# Patient Record
Sex: FEMALE | Race: BLACK OR AFRICAN AMERICAN | NOT HISPANIC OR LATINO | Employment: UNEMPLOYED | URBAN - METROPOLITAN AREA
[De-identification: names, ages, dates, MRNs, and addresses within clinical notes are randomized per-mention and may not be internally consistent; named-entity substitution may affect disease eponyms.]

---

## 2017-11-16 ENCOUNTER — CONVERSION ENCOUNTER (OUTPATIENT)
Dept: MAMMOGRAPHY | Facility: HOSPITAL | Age: 52
End: 2017-11-16

## 2018-06-18 ENCOUNTER — OFFICE VISIT CONVERTED (OUTPATIENT)
Dept: GASTROENTEROLOGY | Facility: CLINIC | Age: 53
End: 2018-06-18
Attending: NURSE PRACTITIONER

## 2019-04-12 ENCOUNTER — HOSPITAL ENCOUNTER (OUTPATIENT)
Dept: OTHER | Facility: HOSPITAL | Age: 54
Discharge: HOME | End: 2019-04-12
Attending: INTERNAL MEDICINE

## 2019-04-12 LAB
25(OH)D3 SERPL-MCNC: 14.1 NG/ML (ref 30–100)
ALBUMIN SERPL-MCNC: 4.6 G/DL (ref 3.5–5)
ALBUMIN/GLOB SERPL: 1.8 {RATIO} (ref 1.4–2.6)
ALP SERPL-CCNC: 89 U/L (ref 53–141)
ALT SERPL-CCNC: 12 U/L (ref 10–40)
ANION GAP SERPL CALC-SCNC: 15 MMOL/L (ref 8–19)
AST SERPL-CCNC: 16 U/L (ref 15–50)
BILIRUB SERPL-MCNC: 0.27 MG/DL (ref 0.2–1.3)
BUN SERPL-MCNC: 12 MG/DL (ref 5–25)
BUN/CREAT SERPL: 19 {RATIO} (ref 6–20)
CALCIUM SERPL-MCNC: 9.3 MG/DL (ref 8.7–10.4)
CHLORIDE SERPL-SCNC: 100 MMOL/L (ref 99–111)
CHOLEST SERPL-MCNC: 185 MG/DL (ref 107–200)
CHOLEST/HDLC SERPL: 2.2 {RATIO} (ref 3–6)
CONV CO2: 26 MMOL/L (ref 22–32)
CONV TOTAL PROTEIN: 7.1 G/DL (ref 6.3–8.2)
CREAT UR-MCNC: 0.62 MG/DL (ref 0.5–0.9)
GFR SERPLBLD BASED ON 1.73 SQ M-ARVRAT: >60 ML/MIN/{1.73_M2}
GLOBULIN UR ELPH-MCNC: 2.5 G/DL (ref 2–3.5)
GLUCOSE SERPL-MCNC: 130 MG/DL (ref 65–99)
HDLC SERPL-MCNC: 85 MG/DL (ref 40–60)
IRON SATN MFR SERPL: 17 % (ref 20–55)
IRON SERPL-MCNC: 61 UG/DL (ref 60–170)
LDLC SERPL CALC-MCNC: 80 MG/DL (ref 70–100)
OSMOLALITY SERPL CALC.SUM OF ELEC: 286 MOSM/KG (ref 273–304)
POTASSIUM SERPL-SCNC: 4.2 MMOL/L (ref 3.5–5.3)
SODIUM SERPL-SCNC: 137 MMOL/L (ref 135–147)
T4 FREE SERPL-MCNC: 1.3 NG/DL (ref 0.9–1.8)
TIBC SERPL-MCNC: 359 UG/DL (ref 245–450)
TRANSFERRIN SERPL-MCNC: 251 MG/DL (ref 250–380)
TRIGL SERPL-MCNC: 98 MG/DL (ref 40–150)
TSH SERPL-ACNC: 1.2 M[IU]/L (ref 0.27–4.2)
VIT B12 SERPL-MCNC: 627 PG/ML (ref 211–911)
VLDLC SERPL-MCNC: 20 MG/DL (ref 5–37)

## 2019-04-13 LAB — HCV AB S/CO SERPL IA: <0.1 S/CO RATIO (ref 0–0.9)

## 2019-04-25 ENCOUNTER — HOSPITAL ENCOUNTER (OUTPATIENT)
Dept: URGENT CARE | Facility: CLINIC | Age: 54
Discharge: HOME | End: 2019-04-25
Attending: FAMILY MEDICINE

## 2019-09-08 ENCOUNTER — HOSPITAL ENCOUNTER (OUTPATIENT)
Dept: URGENT CARE | Facility: CLINIC | Age: 54
Discharge: HOME | End: 2019-09-08
Attending: NURSE PRACTITIONER

## 2020-02-25 ENCOUNTER — CONVERSION ENCOUNTER (OUTPATIENT)
Dept: FAMILY MEDICINE CLINIC | Facility: CLINIC | Age: 55
End: 2020-02-25

## 2020-02-25 ENCOUNTER — OFFICE VISIT CONVERTED (OUTPATIENT)
Dept: FAMILY MEDICINE CLINIC | Facility: CLINIC | Age: 55
End: 2020-02-25
Attending: NURSE PRACTITIONER

## 2020-03-25 ENCOUNTER — TELEMEDICINE CONVERTED (OUTPATIENT)
Dept: FAMILY MEDICINE CLINIC | Facility: CLINIC | Age: 55
End: 2020-03-25
Attending: NURSE PRACTITIONER

## 2020-04-10 ENCOUNTER — TELEMEDICINE CONVERTED (OUTPATIENT)
Dept: FAMILY MEDICINE CLINIC | Facility: CLINIC | Age: 55
End: 2020-04-10
Attending: NURSE PRACTITIONER

## 2020-06-24 ENCOUNTER — HOSPITAL ENCOUNTER (OUTPATIENT)
Dept: FAMILY MEDICINE CLINIC | Facility: CLINIC | Age: 55
Discharge: HOME | End: 2020-06-24
Attending: NURSE PRACTITIONER

## 2020-06-24 ENCOUNTER — OFFICE VISIT CONVERTED (OUTPATIENT)
Dept: FAMILY MEDICINE CLINIC | Facility: CLINIC | Age: 55
End: 2020-06-24
Attending: NURSE PRACTITIONER

## 2020-06-24 LAB
25(OH)D3 SERPL-MCNC: 15.6 NG/ML (ref 30–100)
ALBUMIN SERPL-MCNC: 4.4 G/DL (ref 3.5–5)
ALBUMIN/GLOB SERPL: 2 {RATIO} (ref 1.4–2.6)
ALP SERPL-CCNC: 73 U/L (ref 53–141)
ALT SERPL-CCNC: 9 U/L (ref 10–40)
ANION GAP SERPL CALC-SCNC: 17 MMOL/L (ref 8–19)
AST SERPL-CCNC: 15 U/L (ref 15–50)
BASOPHILS # BLD AUTO: 0.01 10*3/UL (ref 0–0.2)
BASOPHILS NFR BLD AUTO: 0.3 % (ref 0–3)
BILIRUB SERPL-MCNC: <0.15 MG/DL (ref 0.2–1.3)
BUN SERPL-MCNC: 7 MG/DL (ref 5–25)
BUN/CREAT SERPL: 11 {RATIO} (ref 6–20)
CALCIUM SERPL-MCNC: 8.8 MG/DL (ref 8.7–10.4)
CHLORIDE SERPL-SCNC: 101 MMOL/L (ref 99–111)
CHOLEST SERPL-MCNC: 170 MG/DL (ref 107–200)
CHOLEST/HDLC SERPL: 2.5 {RATIO} (ref 3–6)
CONV ABS IMM GRAN: 0.01 10*3/UL (ref 0–0.2)
CONV CO2: 24 MMOL/L (ref 22–32)
CONV IMMATURE GRAN: 0.3 % (ref 0–1.8)
CONV TOTAL PROTEIN: 6.6 G/DL (ref 6.3–8.2)
CREAT UR-MCNC: 0.62 MG/DL (ref 0.5–0.9)
DEPRECATED RDW RBC AUTO: 47.2 FL (ref 36.4–46.3)
EOSINOPHIL # BLD AUTO: 0.04 10*3/UL (ref 0–0.7)
EOSINOPHIL # BLD AUTO: 1.1 % (ref 0–7)
ERYTHROCYTE [DISTWIDTH] IN BLOOD BY AUTOMATED COUNT: 13.7 % (ref 11.7–14.4)
FOLATE SERPL-MCNC: 5.5 NG/ML (ref 4.8–20)
GFR SERPLBLD BASED ON 1.73 SQ M-ARVRAT: >60 ML/MIN/{1.73_M2}
GLOBULIN UR ELPH-MCNC: 2.2 G/DL (ref 2–3.5)
GLUCOSE SERPL-MCNC: 93 MG/DL (ref 65–99)
HCT VFR BLD AUTO: 37.4 % (ref 37–47)
HDLC SERPL-MCNC: 67 MG/DL (ref 40–60)
HGB BLD-MCNC: 12.2 G/DL (ref 12–16)
LDLC SERPL CALC-MCNC: 48 MG/DL (ref 70–100)
LYMPHOCYTES # BLD AUTO: 1.9 10*3/UL (ref 1–5)
LYMPHOCYTES NFR BLD AUTO: 54.3 % (ref 20–45)
MCH RBC QN AUTO: 30.3 PG (ref 27–31)
MCHC RBC AUTO-ENTMCNC: 32.6 G/DL (ref 33–37)
MCV RBC AUTO: 92.8 FL (ref 81–99)
MONOCYTES # BLD AUTO: 0.27 10*3/UL (ref 0.2–1.2)
MONOCYTES NFR BLD AUTO: 7.7 % (ref 3–10)
NEUTROPHILS # BLD AUTO: 1.27 10*3/UL (ref 2–8)
NEUTROPHILS NFR BLD AUTO: 36.3 % (ref 30–85)
NRBC CBCN: 0 % (ref 0–0.7)
OSMOLALITY SERPL CALC.SUM OF ELEC: 286 MOSM/KG (ref 273–304)
PLATELET # BLD AUTO: 172 10*3/UL (ref 130–400)
PMV BLD AUTO: 10.6 FL (ref 9.4–12.3)
POTASSIUM SERPL-SCNC: 3.4 MMOL/L (ref 3.5–5.3)
RBC # BLD AUTO: 4.03 10*6/UL (ref 4.2–5.4)
SODIUM SERPL-SCNC: 139 MMOL/L (ref 135–147)
T4 FREE SERPL-MCNC: 1.1 NG/DL (ref 0.9–1.8)
TRIGL SERPL-MCNC: 274 MG/DL (ref 40–150)
TSH SERPL-ACNC: 3.08 M[IU]/L (ref 0.27–4.2)
VIT B12 SERPL-MCNC: 526 PG/ML (ref 211–911)
VLDLC SERPL-MCNC: 55 MG/DL (ref 5–37)
WBC # BLD AUTO: 3.5 10*3/UL (ref 4.8–10.8)

## 2020-07-17 ENCOUNTER — OFFICE VISIT CONVERTED (OUTPATIENT)
Dept: FAMILY MEDICINE CLINIC | Facility: CLINIC | Age: 55
End: 2020-07-17
Attending: NURSE PRACTITIONER

## 2020-10-09 ENCOUNTER — OFFICE VISIT CONVERTED (OUTPATIENT)
Dept: FAMILY MEDICINE CLINIC | Facility: CLINIC | Age: 55
End: 2020-10-09
Attending: NURSE PRACTITIONER

## 2020-11-19 ENCOUNTER — HOSPITAL ENCOUNTER (OUTPATIENT)
Dept: ONCOLOGY | Facility: HOSPITAL | Age: 55
Discharge: HOME | End: 2020-11-19
Attending: INTERNAL MEDICINE

## 2020-11-19 ENCOUNTER — OFFICE VISIT CONVERTED (OUTPATIENT)
Dept: ONCOLOGY | Facility: HOSPITAL | Age: 55
End: 2020-11-19
Attending: INTERNAL MEDICINE

## 2020-12-03 ENCOUNTER — HOSPITAL ENCOUNTER (OUTPATIENT)
Dept: OTHER | Facility: HOSPITAL | Age: 55
Discharge: HOME | End: 2020-12-03
Attending: INTERNAL MEDICINE

## 2020-12-03 LAB — ERYTHROCYTE [SEDIMENTATION RATE] IN BLOOD: 10 MM/H (ref 0–30)

## 2020-12-09 LAB
DSDNA AB SER-ACNC: NEGATIVE [IU]/ML
ENA AB SER IA-ACNC: NEGATIVE {RATIO}

## 2020-12-29 ENCOUNTER — OFFICE VISIT CONVERTED (OUTPATIENT)
Dept: FAMILY MEDICINE CLINIC | Facility: CLINIC | Age: 55
End: 2020-12-29
Attending: NURSE PRACTITIONER

## 2020-12-29 ENCOUNTER — HOSPITAL ENCOUNTER (OUTPATIENT)
Dept: FAMILY MEDICINE CLINIC | Facility: CLINIC | Age: 55
Discharge: HOME | End: 2020-12-29
Attending: NURSE PRACTITIONER

## 2020-12-31 LAB — SARS-COV-2 RNA SPEC QL NAA+PROBE: NOT DETECTED

## 2021-01-05 ENCOUNTER — OFFICE VISIT CONVERTED (OUTPATIENT)
Dept: FAMILY MEDICINE CLINIC | Facility: CLINIC | Age: 56
End: 2021-01-05
Attending: NURSE PRACTITIONER

## 2021-01-08 ENCOUNTER — CONVERSION ENCOUNTER (OUTPATIENT)
Dept: SURGERY | Facility: CLINIC | Age: 56
End: 2021-01-08

## 2021-01-08 ENCOUNTER — OFFICE VISIT CONVERTED (OUTPATIENT)
Dept: SURGERY | Facility: CLINIC | Age: 56
End: 2021-01-08
Attending: SURGERY

## 2021-01-29 ENCOUNTER — HOSPITAL ENCOUNTER (OUTPATIENT)
Dept: PREADMISSION TESTING | Facility: HOSPITAL | Age: 56
Discharge: HOME | End: 2021-01-29
Attending: SURGERY

## 2021-01-30 LAB — SARS-COV-2 RNA SPEC QL NAA+PROBE: NOT DETECTED

## 2021-02-02 ENCOUNTER — TELEPHONE CONVERTED (OUTPATIENT)
Dept: FAMILY MEDICINE CLINIC | Facility: CLINIC | Age: 56
End: 2021-02-02
Attending: NURSE PRACTITIONER

## 2021-02-03 ENCOUNTER — HOSPITAL ENCOUNTER (OUTPATIENT)
Dept: PERIOP | Facility: HOSPITAL | Age: 56
Setting detail: HOSPITAL OUTPATIENT SURGERY
Discharge: HOME | End: 2021-02-03
Attending: SURGERY

## 2021-02-09 ENCOUNTER — HOSPITAL ENCOUNTER (OUTPATIENT)
Dept: GENERAL RADIOLOGY | Facility: HOSPITAL | Age: 56
Discharge: HOME | End: 2021-02-09
Attending: NURSE PRACTITIONER

## 2021-02-23 ENCOUNTER — OFFICE VISIT CONVERTED (OUTPATIENT)
Dept: SURGERY | Facility: CLINIC | Age: 56
End: 2021-02-23
Attending: SURGERY

## 2021-03-09 ENCOUNTER — OFFICE VISIT CONVERTED (OUTPATIENT)
Dept: SURGERY | Facility: CLINIC | Age: 56
End: 2021-03-09
Attending: SURGERY

## 2021-03-19 ENCOUNTER — TELEMEDICINE CONVERTED (OUTPATIENT)
Dept: FAMILY MEDICINE CLINIC | Facility: CLINIC | Age: 56
End: 2021-03-19
Attending: NURSE PRACTITIONER

## 2021-03-25 ENCOUNTER — CONVERSION ENCOUNTER (OUTPATIENT)
Dept: FAMILY MEDICINE CLINIC | Facility: CLINIC | Age: 56
End: 2021-03-25

## 2021-05-10 NOTE — H&P
History and Physical      Patient Name: Kacie Lynch   Patient ID: [id number]   Sex: Female   YOB: 1965    Primary Care Provider: Kaylan COWAN   Referring Provider: Kaylan COWAN    Visit Date: January 8, 2021    Provider: Zachary Ham MD   Location: [facility]   Location Address: [address] [place] [address]  [id number]   Location Phone: [phone]          Chief Complaint  · Outpatient History & Physical / Surgical Orders  · Hernia Consult      History Of Present Illness     Ms. Lynch is a very nice lady who came in today for evaluation. She has had a prior surgery for a laparoscopic cholecystectomy. She was having some abdominal pain and went to the ER recently. She was noted to have an incisional hernia defect with some contained intraabdominal fat. Otherwise, she is doing okay and had no other complaints today.       Past Medical History  Abdominal hernia; Allergies; Anxiety; Bipolar disorder; Chest pain; Depression; Depression; Diverticulitis; GERD; Hair loss; Heart Murmur; Hypertension; Insomnia; Leukocytopenia; Pharyngitis, acute; Urinary Incontinence, unspecified; Vitamin D deficiency         Past Surgical History  Bladder Surg.; Cholecystectomy; Colonoscopy; Hysterectomy; Tubal ligation         Medication List  Bromfed DM 2-30-10 mg/5 mL oral syrup; buspirone 7.5 mg oral tablet; cetirizine 10 mg oral tablet; dicyclomine 20 mg oral tablet; famotidine 20 mg oral tablet; ibuprofen 800 mg oral tablet; Latuda 20 mg oral tablet; Nexium 40 mg oral capsule,delayed release(DR/EC); ondansetron 4 mg oral tablet,disintegrating; tizanidine 4 mg oral tablet; trazodone 300 mg oral tablet; Vitamin D2 1,250 mcg (50,000 unit) oral capsule         Allergy List  ciprofloxacin; hydrocodone-acetaminophen; Latex Exam Gloves; losartan; Vraylar       Allergies Reconciled  Family Medical History  Breast Neoplasm, Malignant; Stroke; Lung cancer  "        Reproductive History   1 Para 1 0 0 0 & Postmenopausal       Social History  Alcohol (Never); Tobacco (Never)         Immunizations  Name Date Admin   Influenza Refused   Influenza Refused         Review of Systems  · Integument  o Admits  o : skin lesion or lump      Vitals  Date Time BP Position Site L\R Cuff Size HR RR TEMP (F) WT  HT  BMI kg/m2 BSA m2 O2 Sat FR L/min FiO2 HC       2021 10:07 AM       14  207lbs 0oz 5'  3\" 36.67 2.04             Physical Examination  · Constitutional  o Appearance  o : well-nourished, well developed, alert, in no acute distress  · Head and Face  o Head  o :   § Inspection  § : atraumatic, normocephalic  · Neck  o Inspection/Palpation  o : supple, normal range of motion  · Respiratory  o Inspection of Chest  o : normal inspection  o Auscultation of Lungs  o : breath sounds normal, no distress, clear to ascultate bilaterally  · Cardiovascular  o Heart  o :   § Auscultation of Heart  § : regular rate and rhythm, no murmur, gallop or rub  · Gastrointestinal  o Abdominal Examination  o : normal bowel sounds, non-tender, soft          Assessment  · Pre-Surgical Orders     V72.84  · Incisional hernia, without obstruction or gangrene     553.21/K43.2       Very nice lady who has a symptomatic incisional hernia.       Plan  · Orders  o [facility] Pre-Op Covid-19 Screening (73716) - 553.21/K43.2 - 2021  o General Surgery Order (GENOR) - 553.21/K43.2 - 2021  · Medications  o Medications have been Reconciled  o Transition of Care or Provider Policy  · Instructions  o PLAN:  o Handouts Provided-Pre-Procedure Instructions including date and time and location of procedure.  o Surgical Facility: [facility]  o ****Surgical Orders****  o ****Patient Status****  o Outpatient  o RISK AND BENEFITS:  o Consent for surgery: Given these options, the patient has verbally expressed an understanding of the risks of surgery and finds these risks acceptable. We will " proceed with surgery as soon as possible.  o Consult Anesthesia for any post-operative block, or any pain management procedure deemed necessary by the anestesiologist for adequate post-operative pain control.   o O.R. PREP: Per protocol  o SCD's preoperatively  o PLEASE SIGN PERMIT FOR: Laparoscopic incisional hernia repair  o *__Kefzol 2 gram IV on call to OR.  o *___The above History and Physical Examination has been completed within 30 days of admission.  o Electronically Identified Patient Education Materials Provided Electronically     We are going to set her up for a laparoscopic incisional hernia repair. I have described the procedure to her as well as the risks and benefits and she is agreeable to proceeding.             Electronically Signed by: Sunita Ko-, -Author on January 8, 2021 03:16:20 PM  Electronically Co-signed by: Zachary Ham MD -Reviewer on January 12, 2021 03:06:19 PM

## 2021-05-12 NOTE — PROGRESS NOTES
Quick Note      Patient Name: Kacie Lynch   Patient ID: [id number]   Sex: Female   YOB: 1965    Primary Care Provider: Nelsy COWAN   Referring Provider: Nelsy COWAN    Visit Date: April 10, 2020    Provider: CHAPO Morocho   Location: [facility]   Location Address: [address] [place] [address], [address] [place] [address]  [id number]   Location Phone: [phone]          History Of Present Illness  Video Conferencing Visit  Kacie Lynch is a 54 year old /Black female who is presenting for evaluation via video conferencing. Verbal consent obtained before beginning visit.   The following staff were present during this visit: CHAPO Marie   TELEHEALTH VISIT  Chief Complaint: Follow up on bipolar to discuss GeneSight results and plan of care.   Provider spent 15 minutes with the patient during telehealth visit.   Past Medical History/Overview of Patient Symptoms     Patient has a long history of bipolar with depression and anxiety.  She is tried and failed multiple medications in the past to include Seroquel, Lamictal, Depakote, Abilify.  She most recently tried Vraylar but developed a rash so she stopped taking it.  She is taken buspirone 7.5 mg 3 times daily for anxiety.  She states anxiety is stable.  She is also on trazodone 300 mg nightly.    GeneSight results reviewed and discussed with patient.  She states she has used Latuda in the past without any side effects.    GERD: She is complaining of acid reflux worse lately.  She is taking Nexium 40 mg daily.    She states her daughter just had a baby this week and she is brought her daughter a new grandbaby home from the hospital.       Physical Examination  · Constitutional  o Appearance  o : no acute distress, well-nourished  · Head and Face  o Head  o :   § Inspection  § : atraumatic, normocephalic  · Respiratory  o Respiratory Effort  o : breathing comfortably, symmetric chest  rise  · Neurologic  o Mental Status Examination  o :   § Orientation  § : grossly oriented to person, place and time  o Gait and Station  o :   § Gait Screening  § : normal gait  · Psychiatric  o General  o : normal mood and affect  o Presence of Abnormal Thoughts  o : no hallucinations, no delusions present, no psychotic thoughts, no homicidal ideation, no suicidal ideation, no evidence of obsessional thinking          Assessment  · Anxiety disorder     300.00/F41.9  · Depression     311/F32.9  · Bipolar disorder     296.80/F31.9  · GERD     530.81      Plan  · Orders  o Physician Telephone Evaluation, 11-20 minutes (35017) - 296.80/F31.9, 311/F32.9, 300.00/F41.9, 530.81 - 04/10/2020  · Medications  o Latuda 20 mg oral tablet   SIG: take 1 tablet (20 mg) by oral route once daily with food (at least 350 calories) for 30 days   DISP: (30) tablets with 2 refills  Prescribed on 04/10/2020     o ranitidine HCl 150 mg oral tablet   SIG: take 1 tablet (150 mg) by oral route once daily at bedtime for 90 days   DISP: (90) tablets with 1 refills  Prescribed on 04/10/2020     o dicyclomine 20 mg oral tablet   SIG: take 1 tablet (20 mg) by oral route 3 times per day as needed   DISP: (270) tablets with 0 refills  Courtesy Refilled on 04/10/2020     · Instructions  o Discussed the need for therapy, either with a certified counselor, psychologist, and/or family . If no improvement is noted or worsening of their condition, return to office or ER. But also discussed with patient that if they are non-responsive to the type of medication they may need to see a psychiatrist for further evaluation and management.  o Plan Of Care:   o Patient instructed to seek medical attention urgently for new or worsening symptoms.  o Patient was educated/instructed on their diagnosis, treatment and medications.  o Take all medications as prescribed/directed.  o Call the office with any concerns or questions.  o Discussed Covid-19  precautions including, but not limited to, social distancing, avoid touching your face, and hand washing.   · Disposition  o Return to clinic in 4 weeks     We will start her on Latuda 20 mg once daily.  Discussed with patient that medicine may take time to take effect and to continue to take it unless she has side effects.  Patient to call the office if she has any side effects.             Electronically Signed by: CHAPO Morocho -Author on April 10, 2020 01:54:11 PM
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
## 2021-05-13 NOTE — PROGRESS NOTES
Progress Note      Patient Name: Kacie Lynch   Patient ID: [id number]   Sex: Female   YOB: 1965    Primary Care Provider: Kaylan COWAN   Referring Provider: Kaylan COWAN    Visit Date: July 17, 2020    Provider: CHAPO Morocho   Location: [facility]   Location Address: [address] [place] [address], [address] [place] [address]  [id number]   Location Phone: [phone]          Chief Complaint  · back pain, ongoing      History Of Present Illness  Kacie Lynch is a 54 year old /Black female who presents for evaluation and treatment of:      For an acute visit today.  She is complaining of lower back pain.  She has chronic lower back pain but states it is gotten worse lately.  She is complaining that pain is radiating down both hips and legs.  She cannot take too much NSAIDs or it upsets her stomach but she does take some over-the-counter ibuprofen with little relief.  She would like a Toradol injection and a steroid shot today.    On exam today she is noted to be wheezing.  She denies ever smoking or any secondhand smoke.  She denies any history of asthma.  She denies feeling sick, denies fever/chills, cough or congestion.       Past Medical History  Disease Name Date Onset Notes   Allergies --  --    Anxiety --  --    Bipolar disorder --  --    Chest pain --  --    Depression 02/25/2020 --    Depression 2009 --    Diverticulitis --  --    GERD --  --    Heart Murmur --  child   Hypertension 06/02/2014 06/02/2014    Insomnia --  --    Pharyngitis, acute --  --    Urinary Incontinence, unspecified 06/26/2014 06/26/2014    Vitamin D deficiency 06/04/2014 06/04/2014          Past Surgical History  Procedure Name Date Notes   Bladder Surg. --  tension free vaginal taping   Cholecystectomy 9/20/12 --    Colonoscopy 2015 --    Hysterectomy --  PARTIAL   Tubal ligation 2007 --          Medication List  Name Date Started Instructions   buspirone 7.5 mg  oral tablet 2020 take 1 tablet (7.5 mg) by oral route 3 times per day for anxiety   cetirizine 10 mg oral tablet 2020 take 1 tablet (10 mg) by oral route once daily for 90 days   dicyclomine 20 mg oral tablet 04/10/2020 take 1 tablet (20 mg) by oral route 3 times per day as needed   famotidine 20 mg oral tablet 2020 take 1 tablet (20 mg) by oral route once daily at bedtime for 90 days   Latuda 20 mg oral tablet 2020 take 1 tablet (20 mg) by oral route once daily with food (at least 350 calories) for 30 days   Nexium 40 mg oral capsule,delayed release(DR/EC) 2020 take 1 capsule (40 mg) by oral route once daily for 90 days   tizanidine 4 mg oral tablet 2020 take 1 tablet (4 mg) by oral route every 8 hours as needed for 90 days   trazodone 300 mg oral tablet 2020 take 1 tablet by oral route at bedtime   Vitamin D2 1,250 mcg (50,000 unit) oral capsule 2020 take 1 capsule by oral route once a week for 90 days         Allergy List  Allergen Name Date Reaction Notes   ciprofloxacin 14 ITCHY RASH --    hydrocodone-acetaminophen --  --  --    ibuprofen --  --  --    Latex Exam Gloves --  --  --    losartan --  COUGH --    Vraylar Mar 25 2020 12:00AM rash/itch --          Family Medical History  Disease Name Relative/Age Notes   Breast Neoplasm, Malignant Aunt/60  Mother/72   --    Stroke Aunt/   grandparent?   Lung cancer Uncle/68   --          Reproductive History  Menstrual   Age Menarche: 0 Cycle Interval(Days): 0 Menses Duration(Days): 0   Number of Tampons: 0 Number of Pads: 0 Certainty of LMP Date: unknown   Menopause Status: Postmenopausal Age Menopause: 0 Method of Birth Control: Tubal Ligation   Pregnancy Summary   Total Pregnancies: 1 Full Term: 1 Premature: 0   Ab Induced: 0 Ab Spontaneous: 0 Ectopics: 0   Multiples: 0 Livin         Social History  Finding Status Start/Stop Quantity Notes   Alcohol Never --/-- --  --    Tobacco Never --/-- --  --   "        Immunizations  NameDate Admin Mfg Trade Name Lot Number Route Inj VIS Given VIS Publication   InfluenzaRefused 02/25/2020 NE Not Entered  NE NE     Comments:          Review of Systems  · Constitutional  o Denies  o : fever, fatigue, weight loss, weight gain  · HENT  o Denies  o : nasal congestion, nasal discharge, sore throat  · Cardiovascular  o Denies  o : lower extremity edema, claudication, chest pressure, palpitations  · Respiratory  o Denies  o : shortness of breath, wheezing, cough, hemoptysis, dyspnea on exertion  · Gastrointestinal  o Denies  o : nausea, vomiting, diarrhea, constipation, abdominal pain  · Integument  o Denies  o : rash, itching  · Musculoskeletal  o Admits  o : back pain, hip pain      Vitals  Date Time BP Position Site L\R Cuff Size HR RR TEMP (F) WT  HT  BMI kg/m2 BSA m2 O2 Sat HC       07/17/2020 01:35 /84 Sitting    69 - R  97.1 226lbs 2oz 5'  3\" 40.06 2.14 98 %          Physical Examination  · Constitutional  o Appearance  o : no acute distress, well-nourished  · Head and Face  o Head  o :   § Inspection  § : atraumatic, normocephalic  · Respiratory  o Respiratory Effort  o : breathing comfortably, symmetric chest rise  o Auscultation of Lungs  o : diffuse wheezing present   · Cardiovascular  o Heart  o :   § Auscultation of Heart  § : regular rate and rhythm, no murmurs, rubs, or gallops  o Peripheral Vascular System  o :   § Extremities  § : no edema  · Neurologic  o Mental Status Examination  o :   § Orientation  § : grossly oriented to person, place and time  o Gait and Station  o :   § Gait Screening  § : normal gait  · Psychiatric  o General  o : normal mood and affect          Assessment  · Chronic bilateral low back pain with bilateral sciatica       Lumbago with sciatica, left side     724.2/M54.42  Lumbago with sciatica, right side     724.2/M54.41  Other chronic pain     724.2/G89.29  · Wheezing     786.07/R06.2    Problems Reconciled  Plan  · Orders  o Lumbar " Spine Complete [facility] (03076) - 724.2/M54.42, 724.2/M54.41, 724.2/G89.29 - 07/17/2020  o IM/SQ - Injection Fee [facility] (32369) - - 07/17/2020  o ACO-39: Current medications updated and reviewed () - - 07/17/2020  o 4.00 - Toradol Injection 60mg (-1) - 724.2/M54.42, 724.2/M54.41, 724.2/G89.29 - 07/17/2020   Injection - Toradol 60 mg; Dose: 60 mg; Site: Right Gluteus; Route: intramuscular; Date: 07/17/2020 13:52:53; Exp: 10/01/2021; Lot: [id number]; Mfg: [employer]; TradeName: ketorolac; Location: [facility]; Administered By: Bethany Carpenter MA; Comment: Pt tolerated well stable condition  o 4.00 - Kenalog Injection 40mg (-1) - 724.2/M54.42, 724.2/M54.41, 724.2/G89.29 - 07/17/2020   Injection - Kenalog 40 mg; Dose: 40 mg; Site: Right Gluteus; Route: intramuscular; Date: 07/17/2020 13:53:35; Exp: 11/01/2021; Lot: [id number]; Mfg: [employer]; TradeName: triamcinolone; Location: [facility]; Administered By: Bethany Carpenter MA; Comment: Pt tolerated well, stable condition  · Medications  o prednisone 20 mg oral tablet   SIG: take 1 tablet (20 mg) by oral route 2 times per day for 6 days   DISP: (12) tablets with 0 refills  Prescribed on 07/17/2020     o Diflucan 150 mg oral tablet   SIG: take 1 tablet (150 mg) by oral route once today, then repeat x 1 dose in 72 hours   DISP: (2) tablets with 0 refills  Prescribed on 07/17/2020     o Medications have been Reconciled  o Transition of Care or Provider Policy  · Instructions  o Patient was educated/instructed on their diagnosis, treatment and medications prior to discharge from the clinic today.  o Patient instructed to seek medical attention urgently for new or worsening symptoms.  o Call the office with any concerns or questions.  · Disposition  o Call or Return if symptoms worsen or persist.     We will give her Toradol 60 mg IM and and Kenalog 40 mg IM today.  We will also start her on prednisone 20 mg 2 tabs daily x6 days.  Discussed that the steroid will  also help with her wheezing.    We will also get an x-ray of the lumbar spine, will call with results.             Electronically Signed by: CHAPO Morocho -Author on July 17, 2020 03:09:03 PM

## 2021-05-13 NOTE — PROGRESS NOTES
Progress Note      Patient Name: Kacie Lynch   Patient ID: [id number]   Sex: Female   YOB: 1965    Primary Care Provider: Kaylan COWAN   Referring Provider: Kaylan COWAN    Visit Date: October 9, 2020    Provider: CHAPO Morocho   Location: [facility]   Location Address: [address] [place] [address], [address]  [id number]   Location Phone: [phone]          Chief Complaint  · 3 month follow up      History Of Present Illness  Kacie Lynch is a 54 year old /Black female who presents for evaluation and treatment of:      3-month follow-up.    History of bipolar/depression/anxiety: She did not tolerate Vraylar.  She was changed to Latuda.  She states she is doing much better since she started Latuda.  She is also on buspirone 7.5 mg 2-3 times per day and she takes trazodone 300 mg nightly.    History of vitamin D deficiency: Her last vitamin D level was 15.6 on 6/24/2020 and she was started on vitamin D 50,000 units weekly.    History of chronic low back pain: She had a flareup of acute back pain in July and was given a Toradol and Kenalog shot and given a prednisone Dosepak.  She takes tizanidine 4 mg 3 times daily as needed.      On her last labs her white blood cell count was noted to be low at 3.5.  It is been noted that she has had chronically low white blood cells.  She has never been referred to hematology.       Past Medical History  Disease Name Date Onset Notes   Allergies --  --    Anxiety --  --    Bipolar disorder --  --    Chest pain --  --    Depression 02/25/2020 --    Depression 2009 --    Diverticulitis --  --    GERD --  --    Heart Murmur --  child   Hypertension 06/02/2014 06/02/2014    Insomnia --  --    Pharyngitis, acute --  --    Urinary Incontinence, unspecified 06/26/2014 06/26/2014    Vitamin D deficiency 06/04/2014 06/04/2014          Past Surgical History  Procedure Name Date  Notes   Bladder Surg. --  tension free vaginal taping   Cholecystectomy 9/20/12 --    Colonoscopy 06/26/18 --    Hysterectomy --  PARTIAL   Tubal ligation 2007 --          Medication List  Name Date Started Instructions   buspirone 7.5 mg oral tablet 03/25/2020 take 1 tablet (7.5 mg) by oral route 3 times per day for anxiety   cetirizine 10 mg oral tablet 02/25/2020 take 1 tablet (10 mg) by oral route once daily for 90 days   dicyclomine 20 mg oral tablet 04/10/2020 take 1 tablet (20 mg) by oral route 3 times per day as needed   famotidine 20 mg oral tablet 04/16/2020 take 1 tablet (20 mg) by oral route once daily at bedtime for 90 days   Latuda 20 mg oral tablet 06/24/2020 take 1 tablet (20 mg) by oral route once daily with food (at least 350 calories) for 30 days   Nexium 40 mg oral capsule,delayed release(DR/EC) 02/25/2020 take 1 capsule (40 mg) by oral route once daily for 90 days   tizanidine 4 mg oral tablet 06/24/2020 take 1 tablet (4 mg) by oral route every 8 hours as needed for 90 days   trazodone 300 mg oral tablet 09/24/2020 take 1 tablet by oral route at bedtime   Vitamin D2 1,250 mcg (50,000 unit) oral capsule 06/25/2020 take 1 capsule by oral route once a week for 90 days         Allergy List  Allergen Name Date Reaction Notes   ciprofloxacin 9/13/14 ITCHY RASH --    hydrocodone-acetaminophen --  --  --    ibuprofen --  --  --    Latex Exam Gloves --  --  --    losartan --  COUGH --    Vraylar Mar 25 2020 12:00AM rash/itch --          Family Medical History  Disease Name Relative/Age Notes   Breast Neoplasm, Malignant Aunt/60  Mother/72   --    Stroke Aunt/   grandparent?   Lung cancer Uncle/68   --          Reproductive History  Menstrual   Age Menarche: 0 Cycle Interval(Days): 0 Menses Duration(Days): 0   Number of Tampons: 0 Number of Pads: 0 Certainty of LMP Date: unknown   Menopause Status: Postmenopausal Age Menopause: 0 Method of Birth Control: Tubal Ligation   Pregnancy Summary   Total  "Pregnancies: 1 Full Term: 1 Premature: 0   Ab Induced: 0 Ab Spontaneous: 0 Ectopics: 0   Multiples: 0 Livin         Social History  Finding Status Start/Stop Quantity Notes   Alcohol Never --/-- --  --    Tobacco Never --/-- --  --          Immunizations  NameDate Admin Mfg Trade Name Lot Number Route Inj VIS Given VIS Publication   InfluenzaRefused 10/09/2020 NE Not Entered  NE NE     Comments:          Review of Systems  · Constitutional  o Denies  o : fever, fatigue, weight loss, weight gain  · Cardiovascular  o Denies  o : lower extremity edema, claudication, chest pressure, palpitations  · Respiratory  o Denies  o : shortness of breath, wheezing, cough, hemoptysis, dyspnea on exertion  · Gastrointestinal  o Denies  o : nausea, vomiting, diarrhea, constipation, abdominal pain  · Integument  o Denies  o : rash, itching  · Psychiatric  o Admits  o : anxiety, difficulty sleeping  o Denies  o : depression, suicidal ideation, homicidal ideation      Vitals  Date Time BP Position Site L\R Cuff Size HR RR TEMP (F) WT  HT  BMI kg/m2 BSA m2 O2 Sat FR L/min FiO2 HC       10/09/2020 01:16 /88 Sitting    86 - R  97.8 217lbs 4oz 5'  3\" 38.48 2.09 97 %            Physical Examination  · Constitutional  o Appearance  o : no acute distress, well-nourished  · Head and Face  o Head  o :   § Inspection  § : atraumatic, normocephalic  · Neck  o Thyroid  o : gland size normal, nontender, no nodules or masses present on palpation, symmetric  · Respiratory  o Respiratory Effort  o : breathing comfortably, symmetric chest rise  o Auscultation of Lungs  o : clear to asculatation bilaterally, no wheezes, rales, or rhonchii  · Cardiovascular  o Heart  o :   § Auscultation of Heart  § : regular rate and rhythm, no murmurs, rubs, or gallops  o Peripheral Vascular System  o :   § Extremities  § : no edema  · Lymphatic  o Neck  o : no lymphadenopathy present  · Neurologic  o Mental Status Examination  o :   § Orientation  § : " grossly oriented to person, place and time  o Gait and Station  o :   § Gait Screening  § : normal gait  · Psychiatric  o General  o : normal mood and affect  o Presence of Abnormal Thoughts  o : no hallucinations, no delusions present, no psychotic thoughts, no homicidal ideation, no suicidal ideation, no evidence of obsessional thinking          Assessment  · Visit for screening mammogram     V76.12/Z12.31  · Anxiety     300.02/F41.1  · Bipolar disorder     296.80/F31.9  · Leukocytopenia     288.50/D72.819      Plan  · Orders  o Screening Mammography; Bilateral 3D (06751, , 06949) - V76.12/Z12.31 - 10/09/2020   pt needs a Thurs or Fri apt  o ACO-39: Current medications updated and reviewed (, 1159F) - - 10/09/2020  o ACO-14: Influenza immunization was not administered for reasons documented [facility] () - - 10/09/2020   patient declines  o ACO-19: Colorectal cancer screening results documented and reviewed (3017F) - - 10/09/2020   06/26/18  o HEMATOLOGY/ONCOLOGY CONSULTATION ([facility]) - 288.50/D72.819 - 10/09/2020   [facility], pt needs a Thur or Fri apt  · Medications  o trazodone 300 mg oral tablet   SIG: take 1 tablet by oral route at bedtime   DISP: (30) Tablet with 5 refills  Adjusted on 10/09/2020     o Medications have been Reconciled  o Transition of Care or Provider Policy  · Instructions  o Patient is taking medications as prescribed and doing well.   o Patient was educated/instructed on their diagnosis, treatment and medications prior to discharge from the clinic today.  o Patient instructed to seek medical attention urgently for new or worsening symptoms.  o Call the office with any concerns or questions.  · Disposition  o Return to clinic in 6 months            Electronically Signed by: CHAPO Morocho -Author on October 9, 2020 01:30:42 PM

## 2021-05-14 VITALS
HEART RATE: 77 BPM | DIASTOLIC BLOOD PRESSURE: 74 MMHG | TEMPERATURE: 97.8 F | SYSTOLIC BLOOD PRESSURE: 138 MMHG | WEIGHT: 207.12 LBS | OXYGEN SATURATION: 98 % | HEIGHT: 63 IN | BODY MASS INDEX: 36.7 KG/M2

## 2021-05-14 VITALS
WEIGHT: 217.25 LBS | OXYGEN SATURATION: 97 % | BODY MASS INDEX: 38.49 KG/M2 | HEART RATE: 86 BPM | DIASTOLIC BLOOD PRESSURE: 88 MMHG | TEMPERATURE: 97.8 F | HEIGHT: 63 IN | SYSTOLIC BLOOD PRESSURE: 142 MMHG

## 2021-05-14 VITALS
SYSTOLIC BLOOD PRESSURE: 142 MMHG | OXYGEN SATURATION: 99 % | TEMPERATURE: 98.6 F | HEIGHT: 63 IN | WEIGHT: 217 LBS | BODY MASS INDEX: 38.45 KG/M2 | DIASTOLIC BLOOD PRESSURE: 82 MMHG | HEART RATE: 68 BPM

## 2021-05-14 VITALS — BODY MASS INDEX: 36.68 KG/M2 | RESPIRATION RATE: 14 BRPM | WEIGHT: 207 LBS | HEIGHT: 63 IN

## 2021-05-14 VITALS — BODY MASS INDEX: 32.62 KG/M2 | WEIGHT: 203 LBS | RESPIRATION RATE: 16 BRPM | HEIGHT: 66 IN

## 2021-05-14 VITALS — RESPIRATION RATE: 18 BRPM | HEIGHT: 63 IN | BODY MASS INDEX: 35.97 KG/M2 | WEIGHT: 203 LBS

## 2021-05-14 VITALS — SYSTOLIC BLOOD PRESSURE: 222 MMHG | DIASTOLIC BLOOD PRESSURE: 110 MMHG

## 2021-05-14 NOTE — PROGRESS NOTES
Progress Note      Patient Name: Kacie Lynch   Patient ID: [id number]   Sex: Female   YOB: 1965    Primary Care Provider: Kaylan COWAN   Referring Provider: Kaylan COWAN    Visit Date: January 5, 2021    Provider: CHAPO Morocho   Location: [facility]   Location Address: [address] [place] [address], [address] [place] [address]  [id number]   Location Phone: [phone]          Chief Complaint  · stomach and back issues      History Of Present Illness  Kacie Lynch is a 55 year old /Black female who presents for evaluation and treatment of:      She has been having chronic stomach issues.  She states she has chronic pain in her abdomen usually in the left upper quadrant that sometimes radiates down her left quadrant.  She does have a history of diverticulosis and states he stopped that same pain as diverticulitis.  She was seen at [facility] emergency department on 12/23/2020.  She had a CT of the abdomen and pelvis with contrast done which showed she had abdominal hernia which contains fat and bowel with no obstruction.    She also complains of chronic back pain which she states radiates down to her feet whenever she is standing.  She also complains that her feet swell.  She states she is done physical therapy in the past which did not help.  She has taken ibuprofen 800 mg with some relief.  As noted on her chart that she has an allergy to ibuprofen but she denies allergy to ibuprofen.  She states she is must return to work tomorrow but she is unable to lift heavy objects.    She complains of chronic hair loss and once to be referred to dermatology.       Past Medical History  Disease Name Date Onset Notes   Allergies --  --    Anxiety --  --    Bipolar disorder --  --    Chest pain --  --    Depression 02/25/2020 --    Depression 2009 --    Diverticulitis --  --    GERD --  --    Heart Murmur --  child   Hypertension 06/02/2014  06/02/2014    Insomnia --  --    Leukocytopenia 10/09/2020 --    Pharyngitis, acute --  --    Urinary Incontinence, unspecified 06/26/2014 06/26/2014    Vitamin D deficiency 06/04/2014 06/04/2014          Past Surgical History  Procedure Name Date Notes   Bladder Surg. --  tension free vaginal taping   Cholecystectomy 9/20/12 --    Colonoscopy 06/26/18 --    Hysterectomy --  PARTIAL   Tubal ligation 2007 --          Medication List  Name Date Started Instructions   Bromfed DM 2-30-10 mg/5 mL oral syrup 12/29/2020 take 10 milliliters by oral route every 4 hours as needed for cough   buspirone 7.5 mg oral tablet 03/25/2020 take 1 tablet (7.5 mg) by oral route 3 times per day for anxiety   cetirizine 10 mg oral tablet 02/25/2020 take 1 tablet (10 mg) by oral route once daily for 90 days   dicyclomine 20 mg oral tablet 04/10/2020 take 1 tablet (20 mg) by oral route 3 times per day as needed   famotidine 20 mg oral tablet 04/16/2020 take 1 tablet (20 mg) by oral route once daily at bedtime for 90 days   Latuda 20 mg oral tablet 06/24/2020 take 1 tablet (20 mg) by oral route once daily with food (at least 350 calories) for 30 days   Nexium 40 mg oral capsule,delayed release(DR/EC) 02/25/2020 take 1 capsule (40 mg) by oral route once daily for 90 days   ondansetron 4 mg oral tablet,disintegrating 12/29/2020 place 1 tab on top of tongue to dissolve, then swallow by translingual route every 4-6 hours as needed for nausea   tizanidine 4 mg oral tablet 06/24/2020 take 1 tablet (4 mg) by oral route every 8 hours as needed for 90 days   trazodone 300 mg oral tablet 01/05/2021 take 1 tablet by oral route at bedtime   Vitamin D2 1,250 mcg (50,000 unit) oral capsule 06/25/2020 take 1 capsule by oral route once a week for 90 days         Allergy List  Allergen Name Date Reaction Notes   ciprofloxacin 9/13/14 ITCHY RASH --    hydrocodone-acetaminophen --  --  --    Latex Exam Gloves --  --  --    losartan --  COUGH --    Vraylar Mar  "2020 12:00AM rash/itch --          Family Medical History  Disease Name Relative/Age Notes   Breast Neoplasm, Malignant Aunt/60  Mother/72   --    Stroke Aunt/   grandparent?   Lung cancer Uncle/68   --          Reproductive History  Menstrual   Age Menarche: 0 Cycle Interval(Days): 0 Menses Duration(Days): 0   Number of Tampons: 0 Number of Pads: 0 Certainty of LMP Date: unknown   Menopause Status: Postmenopausal Age Menopause: 0 Method of Birth Control: Tubal Ligation   Pregnancy Summary   Total Pregnancies: 1 Full Term: 1 Premature: 0   Ab Induced: 0 Ab Spontaneous: 0 Ectopics: 0   Multiples: 0 Livin         Social History  Finding Status Start/Stop Quantity Notes   Alcohol Never --/-- --  --    Tobacco Never --/-- --  --          Immunizations  NameDate Admin Mfg Trade Name Lot Number Route Inj VIS Given VIS Publication   InfluenzaRefused 10/09/2020 NE Not Entered  NE NE     Comments:          Review of Systems  · Constitutional  o Denies  o : fever, fatigue, weight loss, weight gain  · Cardiovascular  o Denies  o : lower extremity edema, claudication, chest pressure, palpitations  · Respiratory  o Denies  o : shortness of breath, wheezing, cough, hemoptysis, dyspnea on exertion  · Gastrointestinal  o Admits  o : diarrhea, abdominal pain  o Denies  o : nausea, vomiting, constipation  · Genitourinary  o Denies  o : urgency, frequency  · Integument  o Admits  o : hair growth change  o Denies  o : rash, itching  · Musculoskeletal  o Admits  o : limitation of motion, back pain, hip pain      Vitals  Date Time BP Position Site L\R Cuff Size HR RR TEMP (F) WT  HT  BMI kg/m2 BSA m2 O2 Sat FR L/min FiO2        2021 08:31 /74 Sitting    77 - R  97.8 207lbs 2oz 5'  3\" 36.69 2.04 98 %            Physical Examination  · Constitutional  o Appearance  o : no acute distress, well-nourished  · Head and Face  o Head  o :   § Inspection  § : atraumatic, normocephalic  · Respiratory  o Respiratory Effort  o : " breathing comfortably, symmetric chest rise  o Auscultation of Lungs  o : clear to asculatation bilaterally, no wheezes, rales, or rhonchii  · Cardiovascular  o Heart  o :   § Auscultation of Heart  § : regular rate and rhythm, no murmurs, rubs, or gallops  · Gastrointestinal  o Abdominal Examination  o :   § Abdomen  § : bowel sounds present, non-distended,+tender LUQ  · Musculoskeletal  o Spine  o :   § Inspection/Palpation  § : tenderness to palpation present, straight leg raise test positive   § Muscle Strength/Tone  § : paraspinal muscle spasm present   · Neurologic  o Mental Status Examination  o :   § Orientation  § : grossly oriented to person, place and time  o Gait and Station  o :   § Gait Screening  § : normal gait  · Psychiatric  o General  o : normal mood and affect          Assessment  · Left upper quadrant abdominal pain     789.02/R10.12  · Chronic midline low back pain with bilateral sciatica       Lumbago with sciatica, right side     724.2/M54.41  Lumbago with sciatica, left side     724.2/M54.42  Other chronic pain     724.2/G89.29  We will get lumbar spine x-ray, will call with results. We will go ahead and refer her to pain management.  · Abdominal hernia     553.9/K46.9  I will refer her to general surgery due to her tonic pain with history of abdominal hernia.  · Hair loss     704.00/L65.9  Referral to Derm per request.      Plan  · Orders  o Lumbar Spine Complete [facility] ([address]) - 724.2/M54.41, 724.2/M54.42, 724.2/G89.29 - 01/05/2021  o ACO-39: Current medications updated and reviewed (1159F, ) - - 01/05/2021  o General Surgery Consult (GNSUR) - 553.9/K46.9, 789.02/R10.12 - 01/05/2021  o PAIN MANAGEMENT CONSULTATION (PAINM) - 724.2/M54.41, 724.2/M54.42, 724.2/G89.29 - 01/05/2021  o DERMATOLOGY CONSULTATION (DERMA) - 704.00/L65.9 - 01/05/2021  · Medications  o ibuprofen 800 mg oral tablet   SIG: take 1 tablet (800 mg) by oral route 3 times per day with food for 30 days   DISP: (90) Tablet  with 2 refills  Prescribed on 01/05/2021     o trazodone 300 mg oral tablet   SIG: take 1 tablet by oral route at bedtime   DISP: (30) Tablet with 5 refills  Adjusted on 01/05/2021     o Medications have been Reconciled  o Transition of Care or Provider Policy  · Instructions  o Instructed to seek medical attention urgently for new or worsening symptoms.  o Patient was educated/instructed on their diagnosis, treatment and medications prior to discharge from the clinic today.  o Patient instructed to seek medical attention urgently for new or worsening symptoms.  o Call the office with any concerns or questions.  · Disposition  o Return to clinic in 3 weeks            Electronically Signed by: CHAPO Morocho -Author on January 5, 2021 10:33:28 AM

## 2021-05-14 NOTE — PROGRESS NOTES
"   Progress Note      Patient Name: Kacie Lynch   Patient ID: [id number]   Sex: Female   YOB: 1965    Primary Care Provider: Kaylan COWAN   Referring Provider: Kaylan COWAN    Visit Date: February 2, 2021    Provider: CHAPO Morocho   Location: [facility]   Location Address: [address] [place] [address], [address]  [id number]   Location Phone: [phone]          Chief Complaint  · Follow-up      History Of Present Illness  Video Conferencing Visit  Kacie Lynch is a 55 year old /Black female who is presenting for evaluation via video conferencing via [employer]. Verbal consent obtained before beginning visit.   The following staff were present during this visit: CHAPO Marie.   Kacie Lynch is a 55 year old /Black female who presents for evaluation and treatment of:      She is following up on her back pain.  She did get referred to pain management and has an appointment.  She did not get her lumbar spine x-ray done because she thought it had to be scheduled.  I advised her that she can take that and get the x-ray done at the hospital without an appointment.  She states she does need a refill on tizanidine.  She does have ibuprofen to take as needed.    Abdominal hernia: She has seen surgeon Dr. Ham and has surgery scheduled tomorrow to have the hernia repaired.  She states also where she had her gallbladder removed she had \"multiple holes in her stomach\" and states the surgeon said he would be repairing those with mesh.    History of bipolar/depression and anxiety: She states she was doing well on Latuda but she keeps forgetting to ask for refill.  She is also on buspirone.  She is also on trazodone for sleep.  She states she is doing well on these medications.    History of GERD: She states she needs a refill on her Nexium and her Pepcid.       Review of " Systems  · Constitutional  o Denies  o : fever, fatigue, weight loss, weight gain  · Cardiovascular  o Denies  o : lower extremity edema, claudication, chest pressure, palpitations  · Respiratory  o Denies  o : shortness of breath, wheezing, cough, hemoptysis, dyspnea on exertion  · Gastrointestinal  o Denies  o : nausea, vomiting, diarrhea, constipation, abdominal pain  · Genitourinary  o Denies  o : urgency, frequency  · Integument  o Denies  o : rash, itching  · Musculoskeletal  o Admits  o : back pain  o Denies  o : limitation of motion  · Psychiatric  o Denies  o : anxiety, depression, suicidal ideation, homicidal ideation      Physical Examination  · Constitutional  o Appearance  o : no acute distress, well-nourished  · Head and Face  o Head  o :   § Inspection  § : atraumatic, normocephalic  · Respiratory  o Respiratory Effort  o : breathing comfortably, symmetric chest rise  · Neurologic  o Mental Status Examination  o :   § Orientation  § : grossly oriented to person, place and time  · Psychiatric  o General  o : normal mood and affect  o Presence of Abnormal Thoughts  o : no hallucinations, no delusions present, no psychotic thoughts, no homicidal ideation, no suicidal ideation, no evidence of obsessional thinking          Assessment  · Lumbago     724.2/M54.5  Patient will get her x-ray done of the lumbar spine after she gets settled from her surgery. She has an appointment with pain management.  · Anxiety     300.02/F41.1  · Bipolar disorder     296.80/F31.9  Stable on meds as listed.  · GERD     530.81      Plan  · Orders  o ACO-39: Current medications updated and reviewed (1159F, ) - - 02/02/2021  · Medications  o buspirone 7.5 mg oral tablet   SIG: take 1 tablet (7.5 mg) by oral route 3 times per day for anxiety   DISP: (90) Tablet with 5 refills  Adjusted on 02/02/2021     o cetirizine 10 mg oral tablet   SIG: take 1 tablet (10 mg) by oral route once daily for 90 days   DISP: (90) Tablet with 3  refills  Adjusted on 02/02/2021     o famotidine 20 mg oral tablet   SIG: take 1 tablet (20 mg) by oral route once daily at bedtime for 90 days   DISP: (90) Tablet with 1 refills  Adjusted on 02/02/2021     o Latuda 20 mg oral tablet   SIG: take 1 tablet (20 mg) by oral route once daily with food (at least 350 calories) for 30 days   DISP: (30) Tablet with 5 refills  Adjusted on 02/02/2021     o Nexium 40 mg oral capsule,delayed release(DR/EC)   SIG: take 1 capsule (40 mg) by oral route once daily for 90 days   DISP: (90) Capsule with 1 refills  Adjusted on 02/02/2021     o tizanidine 4 mg oral tablet   SIG: take 1 tablet (4 mg) by oral route every 8 hours as needed for 90 days   DISP: (270) Tablet with 1 refills  Adjusted on 02/02/2021     o Vitamin D2 1,250 mcg (50,000 unit) oral capsule   SIG: take 1 capsule by oral route once a week for 90 days   DISP: (13) Capsule with 1 refills  Adjusted on 02/02/2021     o ESOMEPRAZOLE MAGNESIUM 40MG DR CAPS   SIG: TAKE 1 CAPSULE(40 MG) BY MOUTH EVERY DAY   DISP: (90) Capsule with 0 refills  Discontinued on 02/02/2021     o Medications have been Reconciled  o Transition of Care or Provider Policy  · Instructions  o Patient was educated/instructed on their diagnosis, treatment and medications prior to discharge from the clinic today.  o Patient instructed to seek medical attention urgently for new or worsening symptoms.  o Call the office with any concerns or questions.  · Disposition  o Call or Return if symptoms worsen or persist.  o Return to clinic in 1 week  o Return to clinic in 6 months            Electronically Signed by: Kaylan Cochran APRN -Author on February 2, 2021 04:17:12 PM

## 2021-05-14 NOTE — PROGRESS NOTES
Progress Note      Patient Name: Kacie Lynch   Patient ID: [id number]   Sex: Female   YOB: 1965    Primary Care Provider: Kaylan COWAN   Referring Provider: Kaylan COWAN    Visit Date: March 19, 2021    Provider: CHAPO Morocho   Location: [facility] [place]   Location Address: [address] [place] [address], [address] [place] [address]  [id number]   Location Phone: [phone]          Chief Complaint  · yeast infection  · need work accommodations       History Of Present Illness  Video Conferencing Visit  Kacie Lynch is a 55 year old /Black female who is presenting for evaluation via video conferencing via [employer]. Verbal consent obtained before beginning visit.   The following staff were present during this visit: CHAPO Marie.   Kacie Lynch is a 55 year old /Black female who presents for evaluation and treatment of:      She states she has been on some antibiotics due to a root canal and now she has a yeast infection.    History of chronic back pain, she has seen [facility] pain and spine.  She is unable to lift more than 40 pounds at a time and needs a note for work. She also states that she is going to need a handicap sticker for her car because she has difficulty walking long distances due to her pain.       Review of Systems  · Constitutional  o Denies  o : fever, fatigue, weight loss, weight gain  · Cardiovascular  o Denies  o : lower extremity edema, claudication, chest pressure, palpitations  · Respiratory  o Denies  o : shortness of breath, wheezing, cough, hemoptysis, dyspnea on exertion  · Gastrointestinal  o Denies  o : nausea, vomiting, diarrhea, constipation, abdominal pain  · Genitourinary  o Admits  o : vaginal discharge  o Denies  o : urgency, frequency  · Musculoskeletal  o Admits  o : limitation of motion, back pain      Physical Examination  · Constitutional  o Appearance  o :  no acute distress, well-nourished  · Head and Face  o Head  o :   § Inspection  § : atraumatic, normocephalic  · Neurologic  o Mental Status Examination  o :   § Orientation  § : grossly oriented to person, place and time  · Psychiatric  o General  o : normal mood and affect          Assessment  · Lumbago     724.2/M54.5  I will fax her a note to not lift more than 40 pounds at work indefinitely. I discussed with her that she will need to fill out the form for the handicap sticker and she can drop it off and I will sign that for her.  · Vaginal candidiasis     112.1/B37.3  I will give her Diflucan x2 doses 72 hours apart.      Plan  · Orders  o ACO-39: Current medications updated and reviewed (, 1159F) - - 03/19/2021  · Medications  o Diflucan 150 mg oral tablet   SIG: take 1 tablet (150 mg) by oral route once today, then repeat x 1 dose in 72 hours   DISP: (2) Tablet with 0 refills  Prescribed on 03/19/2021     · Instructions  o Patient was educated/instructed on their diagnosis, treatment and medications today.  o Patient instructed to seek medical attention urgently for new or worsening symptoms.  o Call the office with any concerns or questions.  · Disposition  o Call or Return if symptoms worsen or persist.            Electronically Signed by: CHAPO Morocho -Author on March 19, 2021 04:04:09 PM

## 2021-05-14 NOTE — PROGRESS NOTES
Progress Note      Patient Name: Kacie Lynch   Patient ID: [id number]   Sex: Female   YOB: 1965    Primary Care Provider: Kaylan COWAN   Referring Provider: Kaylan COWAN    Visit Date: 2021    Provider: Zachary Ham MD   Location: [facility]   Location Address: [address] [place] [address]  [id number]   Location Phone: [phone]          Chief Complaint  · Follow Up Office Visit      History Of Present Illness     Kacie came in today for evaluation. She is doing well following laparoscopic incisional hernia repair. She is feeling a lot better now. Her soreness has significantly improved.       Past Medical History  Abdominal hernia; Allergies; Anxiety; Bipolar disorder; Chest pain; Depression; Depression; Diverticulitis; GERD; Hair loss; Heart Murmur; Hypertension; Insomnia; Leukocytopenia; Pharyngitis, acute; Urinary Incontinence, unspecified; Vitamin D deficiency         Past Surgical History  Bladder Surg.; Cholecystectomy; Colonoscopy; Hernia; Hysterectomy; Tubal ligation         Medication List  buspirone 7.5 mg oral tablet; cetirizine 10 mg oral tablet; dicyclomine 20 mg oral tablet; famotidine 20 mg oral tablet; ibuprofen 800 mg oral tablet; Latuda 20 mg oral tablet; Nexium 40 mg oral capsule,delayed release(DR/EC); ondansetron 4 mg oral tablet,disintegrating; tizanidine 4 mg oral tablet; trazodone 300 mg oral tablet; Vitamin D2 1,250 mcg (50,000 unit) oral capsule         Allergy List  ciprofloxacin; hydrocodone-acetaminophen; Latex Exam Gloves; losartan; Vraylar         Family Medical History  Breast Neoplasm, Malignant; Stroke; Lung cancer         Reproductive History   1 Para 1 0 0 0 & Postmenopausal       Social History  Alcohol (Never); Tobacco (Never)         Immunizations  Name Date Admin   Influenza Refused   Influenza Refused         Review of Systems  · Cardiovascular  o Denies  o : chest pain,  "irregular heart beats, rapid heart rate, chest pain on exertion, shortness of breath, lower extremity swelling  · Respiratory  o Denies  o : shortness of breath, wheezing, cough, wheezing, chronic cough, coughing up blood  · Gastrointestinal  o Denies  o : nausea, vomiting, diarrhea, chronic abdominal pain, reflux symptoms      Vitals  Date Time BP Position Site L\R Cuff Size HR RR TEMP (F) WT  HT  BMI kg/m2 BSA m2 O2 Sat FR L/min FiO2 HC       03/09/2021 02:03 PM       18  203lbs 0oz 5'  3\" 35.96 2.02             Physical Examination     Today on physical exam, her incisions have healed up nicely. There abdomen is soft.           Assessment  · Postoperative Exam Following Surgery     V67.00       Doing a lot better status post laparoscopic incisional hernia repair.       Plan  · Medications  o Medications have been Reconciled  o Transition of Care or Provider Policy  · Instructions  o Follow up as needed.            Electronically Signed by: Sunita Ko-, -Author on March 10, 2021 10:18:17 AM  Electronically Co-signed by: Zachary Ham MD -Reviewer on March 15, 2021 08:44:12 AM  "

## 2021-05-14 NOTE — PROGRESS NOTES
Progress Note      Patient Name: Kacie Lynch   Patient ID: [id number]   Sex: Female   YOB: 1965    Primary Care Provider: Kaylan COWAN   Referring Provider: Kaylan CWOAN    Visit Date: December 29, 2020    Provider: CHAPO Morocho   Location: [facility]   Location Address: [address] [place] [address], [address]  [id number]   Location Phone: [phone]          Chief Complaint  · fever  · cough      History Of Present Illness  Kacie Lynch is a 55 year old /Black female who presents for evaluation and treatment of:      She is here for an acute visit today.  She states she was exposed to someone with COVID-19, states her child and roommate are both positive.  She was seen in the emergency room on 12/23/2020 for fever and cough.  She had multiple tests and labs done which were unremarkable.  She was tested for COVID-19 which was negative.  She was prescribed prednisone and albuterol inhaler.  She states she started feeling worse again on Saturday, 12/26/2020.  She states she had a fever of 101.2.  She continues to complain of cough and a headache and body aches.  She is also complaining of nausea and vomiting today.  She denies any chest pain, shortness of air or difficulty breathing.    Strep and flu swabs in office today both negative.       Past Medical History  Disease Name Date Onset Notes   Allergies --  --    Anxiety --  --    Bipolar disorder --  --    Chest pain --  --    Depression 02/25/2020 --    Depression 2009 --    Diverticulitis --  --    GERD --  --    Heart Murmur --  child   Hypertension 06/02/2014 06/02/2014    Insomnia --  --    Leukocytopenia 10/09/2020 --    Pharyngitis, acute --  --    Urinary Incontinence, unspecified 06/26/2014 06/26/2014    Vitamin D deficiency 06/04/2014 06/04/2014          Past Surgical History  Procedure Name Date Notes   Bladder Surg. --  tension free vaginal  taping   Cholecystectomy 9/20/12 --    Colonoscopy 06/26/18 --    Hysterectomy --  PARTIAL   Tubal ligation 2007 --          Medication List  Name Date Started Instructions   buspirone 7.5 mg oral tablet 03/25/2020 take 1 tablet (7.5 mg) by oral route 3 times per day for anxiety   cetirizine 10 mg oral tablet 02/25/2020 take 1 tablet (10 mg) by oral route once daily for 90 days   dicyclomine 20 mg oral tablet 04/10/2020 take 1 tablet (20 mg) by oral route 3 times per day as needed   famotidine 20 mg oral tablet 04/16/2020 take 1 tablet (20 mg) by oral route once daily at bedtime for 90 days   Latuda 20 mg oral tablet 06/24/2020 take 1 tablet (20 mg) by oral route once daily with food (at least 350 calories) for 30 days   Nexium 40 mg oral capsule,delayed release(DR/EC) 02/25/2020 take 1 capsule (40 mg) by oral route once daily for 90 days   tizanidine 4 mg oral tablet 06/24/2020 take 1 tablet (4 mg) by oral route every 8 hours as needed for 90 days   trazodone 300 mg oral tablet 10/09/2020 take 1 tablet by oral route at bedtime   Vitamin D2 1,250 mcg (50,000 unit) oral capsule 06/25/2020 take 1 capsule by oral route once a week for 90 days         Allergy List  Allergen Name Date Reaction Notes   ciprofloxacin 9/13/14 ITCHY RASH --    hydrocodone-acetaminophen --  --  --    ibuprofen --  --  --    Latex Exam Gloves --  --  --    losartan --  COUGH --    Vraylar Mar 25 2020 12:00AM rash/itch --        Allergies Reconciled  Family Medical History  Disease Name Relative/Age Notes   Breast Neoplasm, Malignant Aunt/60  Mother/72   --    Stroke Aunt/   grandparent?   Lung cancer Uncle/68   --          Reproductive History  Menstrual   Age Menarche: 0 Cycle Interval(Days): 0 Menses Duration(Days): 0   Number of Tampons: 0 Number of Pads: 0 Certainty of LMP Date: unknown   Menopause Status: Postmenopausal Age Menopause: 0 Method of Birth Control: Tubal Ligation   Pregnancy Summary   Total Pregnancies: 1 Full Term: 1  "Premature: 0   Ab Induced: 0 Ab Spontaneous: 0 Ectopics: 0   Multiples: 0 Livin         Social History  Finding Status Start/Stop Quantity Notes   Alcohol Never --/-- --  --    Tobacco Never --/-- --  --          Immunizations  NameDate Admin Mfg Trade Name Lot Number Route Inj VIS Given VIS Publication   InfluenzaRefused 10/09/2020 NE Not Entered  NE NE     Comments:          Review of Systems  · Constitutional  o Admits  o : fatigue, fever, chills, body aches  o Denies  o : night sweats  · HENT  o Admits  o : headaches  o Denies  o : nasal congestion, nasal discharge, sore throat, ear pain  · Cardiovascular  o Denies  o : lower extremity edema, claudication, chest pressure, palpitations  · Respiratory  o Admits  o : cough  o Denies  o : shortness of breath, wheezing, hemoptysis, dyspnea on exertion  · Gastrointestinal  o Admits  o : nausea, vomiting  o Denies  o : diarrhea, constipation, abdominal pain  · Genitourinary  o Denies  o : urgency, frequency  · Integument  o Denies  o : rash, itching      Vitals  Date Time BP Position Site L\R Cuff Size HR RR TEMP (F) WT  HT  BMI kg/m2 BSA m2 O2 Sat FR L/min FiO2 HC       2020 09:56 /82 Sitting    68 - R  98.6 217lbs 0oz 5'  3\" 38.44 2.09 99 %            Physical Examination  · Constitutional  o Appearance  o : no acute distress, well-nourished  · Head and Face  o Head  o :   § Inspection  § : atraumatic, normocephalic  · Neck  o Thyroid  o : gland size normal, nontender, no nodules or masses present on palpation, symmetric  · Respiratory  o Respiratory Effort  o : breathing comfortably, symmetric chest rise  o Auscultation of Lungs  o : clear to asculatation bilaterally, no wheezes, rales, or rhonchii  · Cardiovascular  o Heart  o :   § Auscultation of Heart  § : regular rate and rhythm, no murmurs, rubs, or gallops  · Lymphatic  o Neck  o : no lymphadenopathy present  · Neurologic  o Mental Status Examination  o :   § Orientation  § : grossly oriented " to person, place and time  o Gait and Station  o :   § Gait Screening  § : normal gait  · Psychiatric  o General  o : normal mood and affect          Results  · In-Office Procedures  o Lab procedure  § IOP - Rapid Strep (32576)   § Beta Strep Gp A Culture: Negative   § Internal Control Verified?: Yes   § IOP - Influenza A/B Test (11040)   § Influenza A: Negative   § Influenza B: Negative   § Internal Control Verified?: Yes       Assessment  · Cough     786.2/R05  Bromfed-DM as needed.  · Exposure to COVID-19 virus     V01.79/Z20.828  Discussed with patient to quarantine for at least 10 days from onset of symptoms and symptoms have resolved. COVID-19 care packet given to patient and discussed. Patient instructed not to go anywhere except to seek medical care. Instructed to seek medical care for any difficulty of breathing, unable to keep fluids down, or worsening of symptoms.  · Fever     780.60/R50.9  · Body aches     780.96/R52  · Nausea & vomiting     787.01/R11.2  We will give her Zofran ODT to take as needed.      Plan  · Orders  o [facility] NCOV2 (send-out) (77643) - V01.79/Z20.828, 786.2/R05, 780.60/R50.9, 780.96/R52 - 12/29/2020  o ACO-39: Current medications updated and reviewed (, 1159F) - - 12/29/2020  o ACO-14: Influenza immunization was not administered for reasons documented [facility] () - - 12/29/2020   refused  · Medications  o Bromfed DM 2-30-10 mg/5 mL oral syrup   SIG: take 10 milliliters by oral route every 4 hours as needed for cough   DISP: (300) Milliliter with 0 refills  Prescribed on 12/29/2020     o ondansetron 4 mg oral tablet,disintegrating   SIG: place 1 tab on top of tongue to dissolve, then swallow by translingual route every 4-6 hours as needed for nausea   DISP: (20) Tablet with 0 refills  Prescribed on 12/29/2020     · Instructions  o Rest. Increase Fluids.  o Patient was educated/instructed on their diagnosis, treatment and medications prior to discharge from the  clinic today.  o Patient instructed to seek medical attention urgently for new or worsening symptoms.  o Call the office with any concerns or questions.  o Discussed Covid-19 precautions including, but not limited to, social distancing, avoid touching your face, and hand washing.   · Disposition  o Call or Return if symptoms worsen or persist.            Electronically Signed by: CHAPO Morocho -Author on December 29, 2020 12:33:13 PM

## 2021-05-14 NOTE — PROGRESS NOTES
Progress Note      Patient Name: Kacie Lynch   Patient ID: [id number]   Sex: Female   YOB: 1965    Primary Care Provider: Kaylan COWAN   Referring Provider: Kaylan COWAN    Visit Date: 2021    Provider: Zachary Ham MD   Location: [facility]   Location Address: [address] [place] [address]  [id number]   Location Phone: [phone]          Chief Complaint  · Follow Up Office Visit      History Of Present Illness     Ms. Lynch came back for follow-up. She is doing well following laparoscopic incisional hernia repair. She is still pretty sore, especially if she gets up and moves around a lot. Otherwise, she is doing fine. She is able to eat and go to the bathroom okay.       Past Medical History  Abdominal hernia; Allergies; Anxiety; Bipolar disorder; Chest pain; Depression; Depression; Diverticulitis; GERD; Hair loss; Heart Murmur; Hypertension; Insomnia; Leukocytopenia; Pharyngitis, acute; Urinary Incontinence, unspecified; Vitamin D deficiency         Past Surgical History  Bladder Surg.; Cholecystectomy; Colonoscopy; Hernia; Hysterectomy; Tubal ligation         Medication List  buspirone 7.5 mg oral tablet; cetirizine 10 mg oral tablet; dicyclomine 20 mg oral tablet; famotidine 20 mg oral tablet; ibuprofen 800 mg oral tablet; Latuda 20 mg oral tablet; Nexium 40 mg oral capsule,delayed release(DR/EC); ondansetron 4 mg oral tablet,disintegrating; tizanidine 4 mg oral tablet; trazodone 300 mg oral tablet; Vitamin D2 1,250 mcg (50,000 unit) oral capsule         Allergy List  ciprofloxacin; hydrocodone-acetaminophen; Latex Exam Gloves; losartan; Vraylar         Family Medical History  Breast Neoplasm, Malignant; Stroke; Lung cancer         Reproductive History   1 Para 1 0 0 0 & Postmenopausal       Social History  Alcohol (Never); Tobacco (Never)         Immunizations  Name Date Admin   Influenza Refused   Influenza  "Refused         Review of Systems  · Cardiovascular  o Denies  o : chest pain, irregular heart beats, rapid heart rate, chest pain on exertion, shortness of breath, lower extremity swelling  · Respiratory  o Denies  o : shortness of breath, wheezing, cough, wheezing, chronic cough, coughing up blood  · Gastrointestinal  o Denies  o : nausea, vomiting, diarrhea, chronic abdominal pain, reflux symptoms      Vitals  Date Time BP Position Site L\R Cuff Size HR RR TEMP (F) WT  HT  BMI kg/m2 BSA m2 O2 Sat FR L/min FiO2 HC       02/23/2021 02:09 PM       16  203lbs 0oz 5'  6\" 32.76 2.07             Physical Examination     Today on physical exam, her incisions look really good. There is no evidence of infection.           Assessment  · Postoperative Exam Following Surgery     V67.00       Doing okay status post laparoscopic incisional hernia repair. She is having a fair amount of soreness but I don't think that would be atypical at this juncture.       Plan  · Medications  o Medications have been Reconciled  o Transition of Care or Provider Policy  · Instructions  o Follow up in 2 weeks.  o We will make some decisions about when she will go back to work at that time.            Electronically Signed by: Sunita Ko-, -Author on February 23, 2021 05:01:01 PM  Electronically Co-signed by: Zachary Ham MD -Reviewer on February 24, 2021 08:36:12 AM  "

## 2021-05-15 VITALS
DIASTOLIC BLOOD PRESSURE: 76 MMHG | DIASTOLIC BLOOD PRESSURE: 78 MMHG | HEIGHT: 66 IN | OXYGEN SATURATION: 97 % | SYSTOLIC BLOOD PRESSURE: 150 MMHG | BODY MASS INDEX: 34.59 KG/M2 | TEMPERATURE: 98.2 F | HEART RATE: 79 BPM | WEIGHT: 215.25 LBS | SYSTOLIC BLOOD PRESSURE: 152 MMHG

## 2021-05-15 VITALS
HEIGHT: 63 IN | BODY MASS INDEX: 39.25 KG/M2 | HEART RATE: 69 BPM | OXYGEN SATURATION: 96 % | TEMPERATURE: 97.1 F | SYSTOLIC BLOOD PRESSURE: 132 MMHG | WEIGHT: 221.5 LBS | DIASTOLIC BLOOD PRESSURE: 86 MMHG

## 2021-05-15 VITALS
TEMPERATURE: 97.1 F | OXYGEN SATURATION: 98 % | SYSTOLIC BLOOD PRESSURE: 142 MMHG | HEART RATE: 69 BPM | WEIGHT: 226.12 LBS | DIASTOLIC BLOOD PRESSURE: 84 MMHG | BODY MASS INDEX: 40.07 KG/M2 | HEIGHT: 63 IN

## 2021-05-16 VITALS
SYSTOLIC BLOOD PRESSURE: 136 MMHG | BODY MASS INDEX: 34.59 KG/M2 | HEIGHT: 63 IN | DIASTOLIC BLOOD PRESSURE: 84 MMHG | WEIGHT: 195.25 LBS

## 2021-05-28 VITALS
WEIGHT: 210.1 LBS | TEMPERATURE: 97 F | BODY MASS INDEX: 37.23 KG/M2 | HEART RATE: 94 BPM | RESPIRATION RATE: 18 BRPM | DIASTOLIC BLOOD PRESSURE: 73 MMHG | HEIGHT: 63 IN | SYSTOLIC BLOOD PRESSURE: 157 MMHG | OXYGEN SATURATION: 100 %

## 2021-05-28 NOTE — PROGRESS NOTES
Patient: KACIE LYNCH     Acct: [id number]     Report: #[id number]  UNIT #: [id number]     : 1965    Encounter Date:2020  PRIMARY CARE: SCOTT DUNN  ***Signed***  --------------------------------------------------------------------------------------------------------------------  NURSE INTAKE      Visit Type      New Patient Visit            Chief Complaint      DECREASED WBC            Referring Provider/Copies To      Primary Care Provider:  SCOTT DUNN      Copies To:   SCOTT DUNN            History and Present Illness      Past History      Kacie Lynch is a 55-year-old -American woman with multiple medical    problems who was referred for longstanding asymptomatic mild leukopenia.            Her comorbidities include the following: Seasonal allergies, anxiety, bipolar     disorder, depression, diverticulitis, GERD, hypertension, insomnia, history of     vitamin D deficiency.      She is status post some sort of bladder sling surgery, status post cholecyst    ectomy, partial hysterectomy for gestational trophoblastic tumor in , status    post tubal ligation.            Patient denies chills, fevers, night sweats, rigors, loss of appetite, loss of     weight, change in bowel/bladder habits.  Denies shortness of breath, chest     discomfort, palpitations.  She specifically denies recurrent infections or     episodes where she is placed on antibiotics.            I reviewed her laboratory investigations and they are follows:      10/29/2020:      WBC 4.36 hemoglobin 13.3 hematocrit 40.1 platelet count 191,000 grossly normal     differential count      2020: WBC is 3.5 ANC 1.27      2019: WBC 6.44      2019: WBC 8.12      2018 WBC 4.44      10/2013-WBC 4.29      2008 WBC 3.91      2007 WBC 3.33 10/2005 WBC 4.56            10/2020 CMP grossly within normal limits      B12 and folate levels have been normal      TSH normal             PAST, FAMILY   Past Medical History      Past Medical History:  Depression, Hypertension, Low WBC Count      Other PMH:        Please see HPI      Other Hematology History:        DIVERTICULA            Past Surgical History      Cholecystectomy (PARTIAL)            Family History      Family History:  Breast Cancer (MATERNAL AUNT AND MOTHER)            Mother-breast carcinoma at the age of 72      Maternal aunt also had breast carcinoma in her 70s      Patient has 2 brothers and 4 sisters who are healthy and 1 daughter who is      healthy.            Social History      Marital Status:  Single      Lives independently:  Yes      Number of Children:  1      Occupation:  [employer]            Tobacco Use      Tobacco status:  Never smoker      Currently Vaping:  No            Alcohol Use      Alcohol intake:  None            Substance Use      Substance use:  Marijuana            REVIEW OF SYSTEMS      General:  Admits: Fatigue;          Denies: Appetite Change, Fever, Night Sweats, Weight Gain, Weight Loss      Eye:  Denies Blurred Vision, Denies Corrective Lenses, Denies Diplopia, Denies     Vision Changes      ENT:  Denies Headache, Denies Hearing Loss, Denies Hoarseness, Denies Sore     Throat      Cardiovascular:  Denies Chest Pain, Denies Palpitations      Respiratory:  Denies: Cough, Coughing Blood, Productive Cough, Shortness of Air,    Wheezing      Gastrointestinal:  Admits Constipation, Admits Nausea/Vomiting; Denies Bloody     Stools, Denies Diarrhea, Denies Problem Swallowing, Denies Unable to Control     Bowels      Other      FREQUENT HEARTBURN      Genitourinary:  Denies Blood in Urine, Denies Incontinence, Denies Painful     Urination      Musculoskeletal:  Denies Back Pain; Admits Muscle Pain (WEAKNESS), Admits     Painful Joints (SWOLLEN JOINTS)      Integumentary:  Denies Itching, Denies Lesions, Denies Rash      Neurologic:  Denies Dizziness, Denies Numbness\Tingling, Denies Seizures     "  Psychiatric:  Admits Anxiety, Admits Depression      Other      MEMORY LOSS, PANIC ATTACKS      Endocrine:  Denies Cold Intolerance, Denies Heat Intolerance      Hematologic/Lymphatic:  Denies Bruising, Denies Bleeding, Denies Enlarged Lymph     Nodes      Reproductive:  Denies: Menopause, Heavy Periods, Pregnant, Still Menstruating            VITAL SIGNS AND SCORES      Vitals      Height 5 ft 3.00 in / 160.02 cm      Weight 210 lbs 1.574 oz / 95.3 kg      BSA 1.97 m2      BMI 37.2 kg/m2      Temperature 97.0 F / 36.11 C - Temporal      Pulse 94      Respirations 18      Blood Pressure 157/73 Sitting, Left Arm      Pulse Oximetry 100%, RM AIR            Pain Score      Experiencing any pain?:  No      Pain Scale Used:  Numerical      Pain Intensity:  0            Fatigue Score      Experiencing any fatigue?:  Yes      Fatigue (0-10 scale):  2            EXAM      General appearance:  in no apparent distress, cooperative, appears stated age.      HEENT: No pallor, no icterus, oral mucosa moist      Neck: Supple, trachea central-not deviated      Lymph nodes: none palpable peripherally      Cardiovascular: S1-S2 heard, no murmurs, no rubs, no gallops.      Respiratory: Clear to auscultation bilaterally, no adventitious sounds      Abdomen/gastro: Soft, nontender, no palpable hepatosplenomegaly, bowel sounds     heard      Skin: No lesions, no rashes, no petechiae.      Extremities: No pedal edema, peripheral pulses felt, no clubbing            PREVENTION      Hx Influenza Vaccination:  No      Influenza Vaccine Declined:  Yes      2 or More Falls in Past Year?:  No      Fall Past Year with Injury?:  No      Hx Pneumococcal Vaccination:  No      Encouraged to follow-up with:  PCP regarding preventative exams.      Chart initiated by      KIERA BROWN MA            ALLERGY/MEDS      Allergies      Coded Allergies:             AMOXICILLIN (Verified  Allergy, Intermediate, rash, \"yeast infection\",     11/19/20)           " "CLAVULANIC ACID (Verified  Allergy, Intermediate, rash, \"yeast infection\",     11/19/20)           LATEX (Verified  Allergy, Unknown, RASH, 11/19/20)           HYDROCODONE BIT (Unverified  Adverse Reaction, Unknown, ITCHING, 11/19/20)            Medications      Last Reconciled on 1/11/13 14:57 by IRENE HENRY      Ergocalciferol (Vitamin D2) 50,000 Units Capsule      27538 UNITS PO MO, #4 CAP         Reported         11/19/20       Dicyclomine Hcl (DICYCLOMINE HCL) 20 Mg Tablet      20 MG PO TID PRN for prn, TAB         Reported         11/19/20       Esomeprazole Mag (nexIUM) 40 Mg Suspdr.pkt      40 MG PO QDAY, #30 PACKET 0 Refills         Reported         6/22/18       tiZANidine HCl (tiZANidine HCl) 4 Mg Capsule      8 MG PO Q8, CAP         Reported         6/22/18       traZODone HCl (traZODone HCl) 300 Mg Tablet      300 MG PO HS, #30 TAB 0 Refills         Reported         6/22/18      Medications Reviewed:  Changes made to meds            IMPRESSION/PLAN      Impression      Chronic mild leukopenia and an -American woman-asymptomatic.            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  It is of no clinical consequence     and hence no further work-up is required for this patient from hematological     perspective.  Patient is discharged back to her primary care provider for     routine medical care and screening protocols.  Thank you very much for referring    this patient to our service and please do not hesitate to reach out should the     need arise futuristically.      New Medications      * DICYCLOMINE HCL 20 MG TABLET: 20 MG PO TID PRN prn      * Ergocalciferol (Vitamin D2) 50,000 UNITS CAPSULE: 50,000 UNITS PO MO #4      Discontinued Medications      * Azithromycin Z-Alejandro (Zithromax Z-Alejandro) 250 MG TABLET: 250 MG PO ASDIR #1         Instructions: Take 500 mg (two tablets) by mouth " the first day, then 250 mg        (one tablet) daily until all taken.      * Benzonatate (Tessalon Perles) 100 MG CAP: 100 MG PO TID cough #20      * predniSONE (Deltasone) 50 MG TAB: 50 MG PO QDAY 5 Days #5            Patient Education      Patient Education Provided:  Yes            Electronically signed by MAYELA YEPEZ  11/19/2020 13:04       Disclaimer: Converted document may not contain table formatting or lab diagrams. Please see [facility] for the authenticated document.

## 2021-07-14 RX ORDER — TRAZODONE HYDROCHLORIDE 300 MG/1
TABLET ORAL
Qty: 30 TABLET | Refills: 2 | Status: SHIPPED | OUTPATIENT
Start: 2021-07-14 | End: 2021-10-26 | Stop reason: SDUPTHER

## 2021-07-14 NOTE — TELEPHONE ENCOUNTER
Left voicemail for patient that medication was sent to pharmacy and to call to schedule follow up.

## 2021-07-22 ENCOUNTER — HOSPITAL ENCOUNTER (EMERGENCY)
Facility: HOSPITAL | Age: 56
Discharge: HOME | End: 2021-07-22
Attending: EMERGENCY MEDICINE | Admitting: EMERGENCY MEDICINE

## 2021-07-22 ENCOUNTER — APPOINTMENT (OUTPATIENT)
Dept: CT IMAGING | Facility: HOSPITAL | Age: 56
End: 2021-07-22

## 2021-07-22 VITALS
HEART RATE: 91 BPM | RESPIRATION RATE: 20 BRPM | HEIGHT: 62 IN | DIASTOLIC BLOOD PRESSURE: 71 MMHG | OXYGEN SATURATION: 96 % | WEIGHT: 211.86 LBS | TEMPERATURE: 98 F | BODY MASS INDEX: 38.99 KG/M2 | SYSTOLIC BLOOD PRESSURE: 172 MMHG

## 2021-07-22 DIAGNOSIS — K57.92 DIVERTICULITIS: Primary | ICD-10-CM

## 2021-07-22 LAB
ALBUMIN SERPL-MCNC: 4.7 G/DL (ref 3.5–5.2)
ALBUMIN/GLOB SERPL: 1.7 G/DL
ALP SERPL-CCNC: 90 U/L (ref 39–117)
ALT SERPL W P-5'-P-CCNC: 16 U/L (ref 1–33)
ANION GAP SERPL CALCULATED.3IONS-SCNC: 16.1 MMOL/L (ref 5–15)
AST SERPL-CCNC: 20 U/L (ref 1–32)
BASOPHILS # BLD AUTO: 0.03 10*3/MM3 (ref 0–0.2)
BASOPHILS NFR BLD AUTO: 0.3 % (ref 0–1.5)
BILIRUB SERPL-MCNC: 0.5 MG/DL (ref 0–1.2)
BILIRUB UR QL STRIP: NEGATIVE
BUN SERPL-MCNC: 8 MG/DL (ref 6–20)
BUN/CREAT SERPL: 12.9 (ref 7–25)
CALCIUM SPEC-SCNC: 9.5 MG/DL (ref 8.6–10.5)
CHLORIDE SERPL-SCNC: 97 MMOL/L (ref 98–107)
CLARITY UR: CLEAR
CO2 SERPL-SCNC: 23.9 MMOL/L (ref 22–29)
COLOR UR: YELLOW
CREAT SERPL-MCNC: 0.62 MG/DL (ref 0.57–1)
DEPRECATED RDW RBC AUTO: 39.4 FL (ref 37–54)
EOSINOPHIL # BLD AUTO: 0.15 10*3/MM3 (ref 0–0.4)
EOSINOPHIL NFR BLD AUTO: 1.5 % (ref 0.3–6.2)
ERYTHROCYTE [DISTWIDTH] IN BLOOD BY AUTOMATED COUNT: 12.2 % (ref 12.3–15.4)
GFR SERPL CREATININE-BSD FRML MDRD: 121 ML/MIN/1.73
GLOBULIN UR ELPH-MCNC: 2.8 GM/DL
GLUCOSE SERPL-MCNC: 107 MG/DL (ref 65–99)
GLUCOSE UR STRIP-MCNC: NEGATIVE MG/DL
HCT VFR BLD AUTO: 35.9 % (ref 34–46.6)
HGB BLD-MCNC: 12.1 G/DL (ref 12–15.9)
HGB UR QL STRIP.AUTO: NEGATIVE
HOLD SPECIMEN: NORMAL
HOLD SPECIMEN: NORMAL
IMM GRANULOCYTES # BLD AUTO: 0.02 10*3/MM3 (ref 0–0.05)
IMM GRANULOCYTES NFR BLD AUTO: 0.2 % (ref 0–0.5)
KETONES UR QL STRIP: ABNORMAL
LEUKOCYTE ESTERASE UR QL STRIP.AUTO: NEGATIVE
LIPASE SERPL-CCNC: 15 U/L (ref 13–60)
LYMPHOCYTES # BLD AUTO: 2.31 10*3/MM3 (ref 0.7–3.1)
LYMPHOCYTES NFR BLD AUTO: 23.8 % (ref 19.6–45.3)
MCH RBC QN AUTO: 29.4 PG (ref 26.6–33)
MCHC RBC AUTO-ENTMCNC: 33.7 G/DL (ref 31.5–35.7)
MCV RBC AUTO: 87.1 FL (ref 79–97)
MONOCYTES # BLD AUTO: 0.66 10*3/MM3 (ref 0.1–0.9)
MONOCYTES NFR BLD AUTO: 6.8 % (ref 5–12)
NEUTROPHILS NFR BLD AUTO: 6.53 10*3/MM3 (ref 1.7–7)
NEUTROPHILS NFR BLD AUTO: 67.4 % (ref 42.7–76)
NITRITE UR QL STRIP: NEGATIVE
NRBC BLD AUTO-RTO: 0 /100 WBC (ref 0–0.2)
PH UR STRIP.AUTO: 6 [PH] (ref 5–8)
PLATELET # BLD AUTO: 239 10*3/MM3 (ref 140–450)
PMV BLD AUTO: 9.7 FL (ref 6–12)
POTASSIUM SERPL-SCNC: 3.3 MMOL/L (ref 3.5–5.2)
PROT SERPL-MCNC: 7.5 G/DL (ref 6–8.5)
PROT UR QL STRIP: NEGATIVE
RBC # BLD AUTO: 4.12 10*6/MM3 (ref 3.77–5.28)
SODIUM SERPL-SCNC: 137 MMOL/L (ref 136–145)
SP GR UR STRIP: 1.01 (ref 1–1.03)
UROBILINOGEN UR QL STRIP: ABNORMAL
WBC # BLD AUTO: 9.7 10*3/MM3 (ref 3.4–10.8)
WHOLE BLOOD HOLD SPECIMEN: NORMAL

## 2021-07-22 PROCEDURE — 25010000002 HYDROMORPHONE 1 MG/ML SOLUTION: Performed by: EMERGENCY MEDICINE

## 2021-07-22 PROCEDURE — 85025 COMPLETE CBC W/AUTO DIFF WBC: CPT | Performed by: EMERGENCY MEDICINE

## 2021-07-22 PROCEDURE — 83690 ASSAY OF LIPASE: CPT | Performed by: EMERGENCY MEDICINE

## 2021-07-22 PROCEDURE — 80053 COMPREHEN METABOLIC PANEL: CPT | Performed by: EMERGENCY MEDICINE

## 2021-07-22 PROCEDURE — 96374 THER/PROPH/DIAG INJ IV PUSH: CPT

## 2021-07-22 PROCEDURE — 96375 TX/PRO/DX INJ NEW DRUG ADDON: CPT

## 2021-07-22 PROCEDURE — 81003 URINALYSIS AUTO W/O SCOPE: CPT | Performed by: EMERGENCY MEDICINE

## 2021-07-22 PROCEDURE — 74177 CT ABD & PELVIS W/CONTRAST: CPT

## 2021-07-22 PROCEDURE — 99283 EMERGENCY DEPT VISIT LOW MDM: CPT

## 2021-07-22 PROCEDURE — 25010000002 ONDANSETRON PER 1 MG: Performed by: EMERGENCY MEDICINE

## 2021-07-22 PROCEDURE — 0 IOPAMIDOL PER 1 ML: Performed by: EMERGENCY MEDICINE

## 2021-07-22 RX ORDER — ONDANSETRON 2 MG/ML
4 INJECTION INTRAMUSCULAR; INTRAVENOUS ONCE
Status: COMPLETED | OUTPATIENT
Start: 2021-07-22 | End: 2021-07-22

## 2021-07-22 RX ORDER — DICYCLOMINE HYDROCHLORIDE 10 MG/1
10 CAPSULE ORAL 4 TIMES DAILY
Qty: 20 CAPSULE | Refills: 0 | Status: SHIPPED | OUTPATIENT
Start: 2021-07-22 | End: 2021-07-28 | Stop reason: SDUPTHER

## 2021-07-22 RX ORDER — HYDROCODONE BITARTRATE AND ACETAMINOPHEN 5; 325 MG/1; MG/1
1 TABLET ORAL EVERY 6 HOURS PRN
Qty: 12 TABLET | Refills: 0 | Status: SHIPPED | OUTPATIENT
Start: 2021-07-22 | End: 2021-11-29

## 2021-07-22 RX ORDER — HYDROCODONE BITARTRATE AND ACETAMINOPHEN 7.5; 325 MG/1; MG/1
1 TABLET ORAL ONCE
Status: COMPLETED | OUTPATIENT
Start: 2021-07-22 | End: 2021-07-22

## 2021-07-22 RX ORDER — TIZANIDINE 4 MG/1
4 TABLET ORAL NIGHTLY PRN
COMMUNITY
End: 2021-10-26 | Stop reason: SDUPTHER

## 2021-07-22 RX ORDER — ESOMEPRAZOLE MAGNESIUM 40 MG/1
40 CAPSULE, DELAYED RELEASE ORAL
COMMUNITY
End: 2021-07-28 | Stop reason: CLARIF

## 2021-07-22 RX ORDER — METRONIDAZOLE 500 MG/1
500 TABLET ORAL 2 TIMES DAILY
Qty: 20 TABLET | Refills: 0 | Status: SHIPPED | OUTPATIENT
Start: 2021-07-22 | End: 2021-10-26

## 2021-07-22 RX ORDER — CIPROFLOXACIN 500 MG/1
500 TABLET, FILM COATED ORAL EVERY 12 HOURS
Qty: 20 TABLET | Refills: 0 | OUTPATIENT
Start: 2021-07-22 | End: 2021-10-24

## 2021-07-22 RX ORDER — SODIUM CHLORIDE 0.9 % (FLUSH) 0.9 %
10 SYRINGE (ML) INJECTION AS NEEDED
Status: DISCONTINUED | OUTPATIENT
Start: 2021-07-22 | End: 2021-07-23 | Stop reason: HOSPADM

## 2021-07-22 RX ORDER — ONDANSETRON 4 MG/1
4 TABLET, ORALLY DISINTEGRATING ORAL 4 TIMES DAILY PRN
Qty: 15 TABLET | Refills: 0 | Status: SHIPPED | OUTPATIENT
Start: 2021-07-22 | End: 2021-10-26 | Stop reason: SDUPTHER

## 2021-07-22 RX ADMIN — ONDANSETRON 4 MG: 2 INJECTION INTRAMUSCULAR; INTRAVENOUS at 20:28

## 2021-07-22 RX ADMIN — IOPAMIDOL 100 ML: 755 INJECTION, SOLUTION INTRAVENOUS at 20:18

## 2021-07-22 RX ADMIN — SODIUM CHLORIDE 1000 ML: 9 INJECTION, SOLUTION INTRAVENOUS at 20:28

## 2021-07-22 RX ADMIN — HYDROMORPHONE HYDROCHLORIDE 1 MG: 1 INJECTION, SOLUTION INTRAMUSCULAR; INTRAVENOUS; SUBCUTANEOUS at 20:28

## 2021-07-22 RX ADMIN — HYDROCODONE BITARTRATE AND ACETAMINOPHEN 1 TABLET: 7.5; 325 TABLET ORAL at 21:57

## 2021-07-22 NOTE — ED PROVIDER NOTES
Subjective   The patient presents to the emergency department complaint of a generalized abdominal discomfort that she states she has been having for several days.  She reports that a history of diverticulitis in the past with her last flare being about 2017.  She states this feels very similar.  She states that it seems to have worsened today.  She reports some nausea vomiting and diarrhea.  She states the nausea and vomiting started today but the diarrhea she has had the entire time.  She states yesterday she had a fever of 101.  She reports that she has been drinking well but has had no appetite and has had decreased intake.  She does have tenderness throughout her abdomen with no rebound or guarding.  She appears to be uncomfortable on exam.  She denies any urinary symptoms.  She denies any blood or mucus in her stools.          Review of Systems   Constitutional: Negative for chills and fever.   HENT: Negative for congestion, ear pain and sore throat.    Eyes: Negative for pain.   Respiratory: Negative for cough, chest tightness and shortness of breath.    Cardiovascular: Negative for chest pain.   Gastrointestinal: Positive for abdominal pain, diarrhea, nausea and vomiting.   Genitourinary: Negative for flank pain and hematuria.   Musculoskeletal: Negative for joint swelling.   Skin: Negative for pallor.   Neurological: Negative for seizures and headaches.   All other systems reviewed and are negative.      Past Medical History:   Diagnosis Date   • Bipolar affective (CMS/HCC)    • Diverticulitis    • IBS (irritable bowel syndrome)        Allergies   Allergen Reactions   • Lisinopril Cough   • Vraylar [Cariprazine] Unknown - High Severity   • Latex Rash       Past Surgical History:   Procedure Laterality Date   • CHOLECYSTECTOMY     • HERNIA REPAIR         History reviewed. No pertinent family history.    Social History     Socioeconomic History   • Marital status: Single     Spouse name: Not on file   •  Number of children: Not on file   • Years of education: Not on file   • Highest education level: Not on file   Tobacco Use   • Smoking status: Never Smoker   • Smokeless tobacco: Never Used   Substance and Sexual Activity   • Alcohol use: Not Currently   • Drug use: Not Currently           Objective   Physical Exam  Vitals and nursing note reviewed.   Constitutional:       General: She is not in acute distress.     Appearance: Normal appearance. She is not toxic-appearing.   HENT:      Head: Normocephalic and atraumatic.      Mouth/Throat:      Mouth: Mucous membranes are moist.   Cardiovascular:      Rate and Rhythm: Normal rate and regular rhythm.      Pulses: Normal pulses.   Pulmonary:      Effort: Pulmonary effort is normal. No respiratory distress.      Breath sounds: Normal breath sounds.   Abdominal:      General: Abdomen is flat.      Palpations: Abdomen is soft.      Tenderness: There is generalized abdominal tenderness.   Musculoskeletal:         General: Normal range of motion.      Cervical back: Normal range of motion and neck supple.   Skin:     General: Skin is warm and dry.      Capillary Refill: Capillary refill takes less than 2 seconds.   Neurological:      Mental Status: She is alert and oriented to person, place, and time. Mental status is at baseline.         Procedures           ED Course  ED Course as of Jul 22 2145   Thu Jul 22, 2021 2135 I reviewed the patient's test results with her.  She states she is feeling much better since her pain medications and feels as if she can go home and manage this there.  She verbalized understanding to follow-up with CHAPO Castellanos on Monday or Tuesday and states that she will return if should she have worsening symptoms.    [TC]      ED Course User Index  [TC] Nickie Mandujano APRN                                           [facility]    Final diagnoses:   Diverticulitis       ED Disposition  ED Disposition     ED Disposition Condition Comment     Discharge Stable           Candidamary jonathan Kaylan L, APRN  [address]  [address]  [facility] [address]  [phone]    In 2 days  FOR FOLLOW UP         Medication List      New Prescriptions    ciprofloxacin 500 MG tablet  Commonly known as: CIPRO  Take 1 tablet by mouth Every 12 (Twelve) Hours.     dicyclomine 10 MG capsule  Commonly known as: BENTYL  Take 1 capsule by mouth 4 (Four) Times a Day.     HYDROcodone-acetaminophen 5-325 MG per tablet  Commonly known as: NORCO  Take 1 tablet by mouth Every 6 (Six) Hours As Needed for Moderate Pain .     metroNIDAZOLE 500 MG tablet  Commonly known as: FLAGYL  Take 1 tablet by mouth 2 (Two) Times a Day.     ondansetron ODT 4 MG disintegrating tablet  Commonly known as: ZOFRAN-ODT  Place 1 tablet on the tongue 4 (Four) Times a Day As Needed for Nausea or Vomiting.           Where to Get Your Medications      These medications were sent to [id number] - [place], [place] - [address] AT [facility] [phone] [facility] [phone]   [address] [place] [address]    Hours: 24-hours Phone: [phone]   · ciprofloxacin 500 MG tablet  · dicyclomine 10 MG capsule  · HYDROcodone-acetaminophen 5-325 MG per tablet  · metroNIDAZOLE 500 MG tablet  · ondansetron ODT 4 MG disintegrating tablet          Nickie Mandujano APRN  07/22/21 2050       Nickie Mandujano APRN  07/22/21 1672

## 2021-07-23 NOTE — DISCHARGE INSTRUCTIONS
Rest, drink plenty of fluids.  Clear liquid diet for 24 to 48 hours and then advance as tolerated to a bland diet until symptoms improve.  Take your meds as prescribed.  Complete the antibiotics.  Follow-up with CHAPO Castellanos on Monday or Tuesday for further evaluation and treatment.  Return to the emergency department for any acutely developing fever, any persistent vomiting, any uncontrolled abdominal pain or any new or worse concerns.

## 2021-07-28 ENCOUNTER — OFFICE VISIT (OUTPATIENT)
Dept: FAMILY MEDICINE CLINIC | Facility: CLINIC | Age: 56
End: 2021-07-28

## 2021-07-28 VITALS
SYSTOLIC BLOOD PRESSURE: 142 MMHG | HEIGHT: 62 IN | DIASTOLIC BLOOD PRESSURE: 78 MMHG | OXYGEN SATURATION: 97 % | WEIGHT: 218.2 LBS | TEMPERATURE: 98.3 F | BODY MASS INDEX: 40.15 KG/M2 | HEART RATE: 82 BPM

## 2021-07-28 DIAGNOSIS — M54.50 LOW BACK PAIN, UNSPECIFIED BACK PAIN LATERALITY, UNSPECIFIED CHRONICITY, UNSPECIFIED WHETHER SCIATICA PRESENT: ICD-10-CM

## 2021-07-28 DIAGNOSIS — E66.01 CLASS 2 SEVERE OBESITY DUE TO EXCESS CALORIES WITH SERIOUS COMORBIDITY AND BODY MASS INDEX (BMI) OF 39.0 TO 39.9 IN ADULT (HCC): ICD-10-CM

## 2021-07-28 DIAGNOSIS — K57.92 DIVERTICULITIS: Primary | ICD-10-CM

## 2021-07-28 PROBLEM — F41.9 ANXIETY: Status: ACTIVE | Noted: 2021-07-28

## 2021-07-28 PROBLEM — G47.00 INSOMNIA: Status: ACTIVE | Noted: 2021-07-28

## 2021-07-28 PROBLEM — R01.1 HEART MURMUR: Status: ACTIVE | Noted: 2021-07-28

## 2021-07-28 PROBLEM — M51.369 DEGENERATION OF LUMBAR INTERVERTEBRAL DISC: Status: ACTIVE | Noted: 2021-02-15

## 2021-07-28 PROBLEM — E66.812 CLASS 2 SEVERE OBESITY DUE TO EXCESS CALORIES WITH SERIOUS COMORBIDITY AND BODY MASS INDEX (BMI) OF 39.0 TO 39.9 IN ADULT: Status: ACTIVE | Noted: 2021-07-28

## 2021-07-28 PROBLEM — F31.9 BIPOLAR DISORDER (HCC): Status: ACTIVE | Noted: 2021-07-28

## 2021-07-28 PROBLEM — M51.36 DEGENERATION OF LUMBAR INTERVERTEBRAL DISC: Status: ACTIVE | Noted: 2021-02-15

## 2021-07-28 PROCEDURE — 99214 OFFICE O/P EST MOD 30 MIN: CPT | Performed by: NURSE PRACTITIONER

## 2021-07-28 RX ORDER — IBUPROFEN 800 MG/1
1 TABLET ORAL 3 TIMES DAILY
COMMUNITY
End: 2021-07-28 | Stop reason: SDUPTHER

## 2021-07-28 RX ORDER — LURASIDONE HYDROCHLORIDE 20 MG/1
1 TABLET, FILM COATED ORAL DAILY
COMMUNITY
End: 2021-10-26 | Stop reason: SDUPTHER

## 2021-07-28 RX ORDER — METHYLPREDNISOLONE ACETATE 80 MG/ML
80 INJECTION, SUSPENSION INTRA-ARTICULAR; INTRALESIONAL; INTRAMUSCULAR; SOFT TISSUE ONCE
Status: COMPLETED | OUTPATIENT
Start: 2021-07-29 | End: 2021-07-29

## 2021-07-28 RX ORDER — CLONIDINE HYDROCHLORIDE 0.1 MG/1
1 TABLET ORAL 2 TIMES DAILY
COMMUNITY
Start: 2021-04-23 | End: 2021-07-28

## 2021-07-28 RX ORDER — CETIRIZINE HYDROCHLORIDE 10 MG/1
1 TABLET ORAL DAILY
COMMUNITY
End: 2021-10-26 | Stop reason: SDUPTHER

## 2021-07-28 RX ORDER — IBUPROFEN 800 MG/1
800 TABLET ORAL 3 TIMES DAILY
Qty: 90 TABLET | Refills: 5 | Status: SHIPPED | OUTPATIENT
Start: 2021-07-28 | End: 2021-10-26 | Stop reason: SDUPTHER

## 2021-07-28 RX ORDER — OMEPRAZOLE 20 MG/1
1 CAPSULE, DELAYED RELEASE ORAL DAILY
COMMUNITY
End: 2021-07-28 | Stop reason: SDUPTHER

## 2021-07-28 RX ORDER — BUSPIRONE HYDROCHLORIDE 7.5 MG/1
1 TABLET ORAL 3 TIMES DAILY
COMMUNITY
End: 2021-10-26 | Stop reason: SDUPTHER

## 2021-07-28 RX ORDER — KETOROLAC TROMETHAMINE 30 MG/ML
60 INJECTION, SOLUTION INTRAMUSCULAR; INTRAVENOUS ONCE
Status: COMPLETED | OUTPATIENT
Start: 2021-07-29 | End: 2021-07-29

## 2021-07-28 RX ORDER — ONDANSETRON 4 MG/1
1 TABLET, FILM COATED ORAL EVERY 4 HOURS PRN
COMMUNITY
End: 2021-07-28 | Stop reason: SDUPTHER

## 2021-07-28 RX ORDER — DICYCLOMINE HYDROCHLORIDE 10 MG/1
10 CAPSULE ORAL 4 TIMES DAILY PRN
Qty: 60 CAPSULE | Refills: 2 | Status: SHIPPED | OUTPATIENT
Start: 2021-07-28 | End: 2021-10-26 | Stop reason: SDUPTHER

## 2021-07-28 RX ORDER — GABAPENTIN 300 MG/1
1 CAPSULE ORAL DAILY
COMMUNITY

## 2021-07-28 RX ORDER — OMEPRAZOLE 20 MG/1
20 CAPSULE, DELAYED RELEASE ORAL DAILY
Qty: 30 CAPSULE | Refills: 5 | Status: SHIPPED | OUTPATIENT
Start: 2021-07-28 | End: 2021-10-26

## 2021-07-28 RX ORDER — FAMOTIDINE 20 MG/1
1 TABLET, FILM COATED ORAL NIGHTLY
COMMUNITY
Start: 2021-02-15 | End: 2021-10-26

## 2021-07-28 RX ORDER — ERGOCALCIFEROL 1.25 MG/1
1 CAPSULE ORAL WEEKLY
COMMUNITY
End: 2021-10-26 | Stop reason: SDUPTHER

## 2021-07-28 NOTE — PATIENT INSTRUCTIONS
"Budget-Friendly Healthy Eating  There are many ways to save money at the grocery store and continue to eat healthy. You can be successful if you:  · Plan meals according to your budget.  · Make a grocery list and only purchase food according to your grocery list.  · Prepare food yourself.  What are tips for following this plan?    Reading food labels  · Compare food labels between brand name foods and the store brand. Often the nutritional value is the same, but the store brand is lower cost.  · Look for products that do not have added sugar, fat, or salt (sodium). These often cost the same but are healthier for you. Products may be labeled as:  ? Sugar-free.  ? Nonfat.  ? Low-fat.  ? Sodium-free.  ? Low-sodium.  · Look for lean ground beef labeled as at least 92% lean and 8% fat.  Shopping  · Buy only the items on your grocery list and go only to the areas of the store that have the items on your list.  · Use coupons only for foods and brands you normally buy. Avoid buying items you wouldn't normally buy simply because they are on sale.  · Check online and in newspapers for weekly deals.  · Buy healthy items from the bulk bins when available, such as herbs, spices, flour, pasta, nuts, and dried fruit.  · Buy fruits and vegetables that are in season. Prices are usually lower on in-season produce.  · Look at the unit price on the price tag. Use it to compare different brands and sizes to find out which item is the best deal.  · Choose healthy items that are often low-cost, such as carrots, potatoes, apples, bananas, and oranges. Dried or canned beans are a low-cost protein source.  · Buy in bulk and freeze extra food. Items you can buy in bulk include meats, fish, poultry, frozen fruits, and frozen vegetables.  · Avoid buying \"ready-to-eat\" foods, such as pre-cut fruits and vegetables and pre-made salads.  · If possible, shop around to discover where you can find the best prices. Consider other retailers such as " "dollar stores, larger wholesale stores, local fruit and vegetable vivit, and farmers markets.  · Do not shop when you are hungry. If you shop while hungry, it may be hard to stick to your list and budget.  · Resist impulse buying. Use your grocery list as your official plan for the week.  · Buy a variety of vegetables and fruits by purchasing fresh, frozen, and canned items.  · Look at the top and bottom shelves for deals. Foods at eye level (eye level of an adult or child) are usually more expensive.  · Be efficient with your time when shopping. The more time you spend at the store, the more money you are likely to spend.  · To save money when choosing more expensive foods like meats and dairy:  ? Choose cheaper cuts of meat, such as bone-in chicken thighs and drumsticks instead of skinless and boneless chicken. When you are ready to prepare the chicken, you can remove the skin yourself to make it healthier.  ? Choose lean meats like chicken or turkey instead of beef.  ? Choose canned seafood, such as tuna, salmon, or sardines.  ? Buy eggs as a low-cost source of protein.  ? Buy dried beans and peas, such as lentils, split peas, or kidney beans instead of meats. Dried beans and peas are a good alternative source of protein.  ? Buy the larger tubs of yogurt instead of individual-sized containers.  · Choose water instead of sodas and other sweetened beverages.  · Avoid buying chips, cookies, and other \"junk food.\" These items are usually expensive and not healthy.  Cooking  · Make extra food and freeze the extras in meal-sized containers or in individual portions for fast meals and snacks.  · Pre-cook on days when you have extra time to prepare meals in advance. You can keep these meals in the fridge or freezer and reheat for a quick meal.  · When you come home from the grocery store, wash, peel, and cut fruits and vegetables so they are ready to use and eat. This will help reduce food waste.  Meal planning  · Do " "not eat out or get fast food. Prepare food at home.  · Make a grocery list and make sure to bring it with you to the store. If you have a smart phone, you could use your phone to create your shopping list.  · Plan meals and snacks according to a grocery list and budget you create.  · Use leftovers in your meal plan for the week.  · Look for recipes where you can cook once and make enough food for two meals.  · Include budget-friendly meals like stews, casseroles, and stir-vázquez dishes.  · Try some meatless meals or try \"no cook\" meals like salads.  · Make sure that half your plate is filled with fruits or vegetables. Choose from fresh, frozen, or canned fruits and vegetables. If eating canned, remember to rinse them before eating. This will remove any excess salt added for packaging.  Summary  · Eating healthy on a budget is possible if you plan your meals according to your budget, purchase according to your budget and grocery list, and prepare food yourself.  · Tips for buying more food on a limited budget include buying generic brands, using coupons only for foods you normally buy, and buying healthy items from the bulk bins when available.  · Tips for buying cheaper food to replace expensive food include choosing cheaper, lean cuts of meat, and buying dried beans and peas.  This information is not intended to replace advice given to you by your health care provider. Make sure you discuss any questions you have with your health care provider.  Document Revised: 12/19/2018 Document Reviewed: 12/19/2018  [employer] Patient Education © 2021 [employer]    Bariatric Surgery Information  Bariatric surgery, also called weight loss surgery, is a procedure that helps you lose weight. You may consider, or your health care provider may suggest, bariatric surgery if:  · You are severely obese and have been unable to lose weight through diet and exercise.  · You have health problems related to obesity, such as:  ? Type 2 " diabetes.  ? Heart disease.  ? Lung disease.  How does bariatric surgery help me lose weight?  Bariatric surgery helps you lose weight by:  · Decreasing how much food your body absorbs. This is done by closing off part of your stomach to make it smaller. This restricts the amount of food your stomach can hold.  · Changing your body's regular digestive process so that food bypasses the parts of your body that absorb calories and nutrients.  If you decide to have bariatric surgery, it is important to continue to eat a healthy diet and exercise regularly after the surgery.  What are the different kinds of bariatric surgery?  There are two kinds of bariatric surgeries:  · Restrictive surgery. This procedure makes your stomach smaller. It does not change your digestive process. The smaller the size of your new stomach, the less food you can eat. There are different types of restrictive surgeries.  · Malabsorptive surgery. This procedure makes your stomach smaller and alters your digestive process so that your body processes less calories and nutrients. These are the most common kind of bariatric surgery. There are different types of malabsorptive surgeries.  What are the different types of restrictive surgery?  Adjustable Gastric Banding  In this procedure, an inflatable band is placed around your stomach near the upper end. This makes the passageway for food into the rest of your stomach much smaller. The band can be adjusted, making it tighter or looser, by filling it with salt solution. Your surgeon can adjust the band based on how you are feeling and how much weight you are losing. The band can be removed in the future. This requires another surgery.  Sleeve Gastrectomy  In this procedure, your stomach is made smaller. This is done by surgically removing a large part of your stomach. When your stomach is smaller, you feel full more quickly and reduce how much you eat.  What are the different types of malabsorptive  surgery?    Antonio-en-Y Gastric Bypass (RGB)  This is the most common weight loss surgery. In this procedure, a small stomach pouch (gastric pouch) is created in the upper part of your stomach. Next, this gastric pouch is attached directly to the middle part of your small intestine. The farther down your small intestine the new connection is made, the fewer calories and nutrients you will absorb. This surgery has the highest rate of complications.  Biliopancreatic Diversion with Duodenal Switch (BPD/DS)  This is a multi-step procedure. First, a large part of your stomach is removed, making your stomach smaller. Next, this smaller stomach is attached to the lower part of your small intestine. Like the RGB surgery, you absorb fewer calories and nutrients the farther down your small intestine the attachment is made.  What are the risks of bariatric surgery?  As with any surgical procedure, each type of bariatric surgery has its own risks. These risks also depend on your age, your overall health, and any other medical conditions you may have. When deciding on bariatric surgery, it is very important to:  · Talk to your health care provider and choose the surgery that is best for you.  · Ask your health care provider about specific risks for the surgery you choose.  Generally, the risks of bariatric surgery include:  · Infection.  · Bleeding.  · Not getting enough nutrients from food (nutritional deficiencies).  · Failure of the device or procedure. This may require another surgery to correct the problem.  Where to find more information  · American Society for Metabolic & Bariatric Surgery: www.asmbs.org  · Weight-control Information Network (WIN): win.niddk.nih.gov  Summary  · Bariatric surgery, also called weight loss surgery, is a procedure that helps you lose weight.  · This surgery may be recommended if you have diabetes, heart disease, or lung disease.  · Generally, risks of bariatric surgery include infection,  bleeding, and failure of the surgery or device, which may require another surgery to correct the problem.  This information is not intended to replace advice given to you by your health care provider. Make sure you discuss any questions you have with your health care provider.  Document Revised: 04/07/2020 Document Reviewed: 01/22/2018  Elsevier Patient Education © 2021 [employer]

## 2021-07-28 NOTE — PROGRESS NOTES
"Chief Complaint  Hospital Follow Up Visit (ER visit for diverticulitis)    Subjective          Kacie Lynch presents to [facility]  History of Present Illness  She is here for follow-up from the emergency room on 7/22/2021.  ER notes were reviewed along with CT scan and lab work.  She was diagnosed with diverticulitis and started on antibiotics Cipro and Flagyl.  She states she is starting to feel much better now.    She does need refills on Bentyl, omeprazole and ibuprofen.    She has chronic back pain and she is wanting to know if she can get a steroid injection and a Toradol injection but would like to come tomorrow for the injections because she states the steroid injection keeps her awake all night.    Obesity: She states she has tried multiple things to help her lose weight and she has been struggling.  She states she is trying to get some exercise and and she is following healthy eating plan.  She is wanting some information about bariatric surgery.  Objective   Vital Signs:   /78   Pulse 82   Temp 98.3 °F (36.8 °C)   Ht 157.5 cm (62\")   Wt 99 kg (218 lb 3.2 oz)   SpO2 97%   BMI 39.91 kg/m²     Physical Exam  Vitals reviewed.   Constitutional:       Appearance: Normal appearance. She is well-developed.   Neck:      Thyroid: No thyroid mass, thyromegaly or thyroid tenderness.   Cardiovascular:      Rate and Rhythm: Normal rate and regular rhythm.      Heart sounds: No murmur heard.   No friction rub. No gallop.    Pulmonary:      Effort: Pulmonary effort is normal.      Breath sounds: Normal breath sounds. No wheezing or rhonchi.   Lymphadenopathy:      Cervical: No cervical adenopathy.   Skin:     General: Skin is warm and dry.   Neurological:      Mental Status: She is alert and oriented to person, place, and time.      Cranial Nerves: No cranial nerve deficit.   Psychiatric:         Mood and Affect: Mood and affect normal.         Behavior: Behavior " normal.         Thought Content: Thought content normal. Thought content does not include homicidal or suicidal ideation.         Judgment: Judgment normal.        Result Review :{Labs  Result Review  Imaging  Med Tab  Media  Procedures :23}                 Assessment and Plan    Diagnoses and all orders for this visit:    1. Diverticulitis (Primary)  Comments:  Diverticulitis improving with medications, continue/finish all medication.    2. Low back pain, unspecified back pain laterality, unspecified chronicity, unspecified whether sciatica present  -     methylPREDNISolone acetate (DEPO-medrol) injection 80 mg  -     ketorolac (TORADOL) injection 60 mg    3. Class 2 severe obesity due to excess calories with serious comorbidity and body mass index (BMI) of 39.0 to 39.9 in adult (CMS/[facility])  Comments:  We discussed healthy eating options and bariatric surgery today.  Patient agrees to referral.  Orders:  -     Ambulatory Referral to Bariatric Surgery    Other orders  -     dicyclomine (BENTYL) 10 MG capsule; Take 1 capsule by mouth 4 (Four) Times a Day As Needed (stomach pains).  Dispense: 60 capsule; Refill: 2  -     omeprazole (priLOSEC) 20 MG capsule; Take 1 capsule by mouth Daily.  Dispense: 30 capsule; Refill: 5  -     ibuprofen (ADVIL,MOTRIN) 800 MG tablet; Take 1 tablet by mouth 3 (Three) Times a Day.  Dispense: 90 tablet; Refill: 5        Follow Up   Return in about 2 months (around 9/28/2021) for Next scheduled follow up.  Patient was given instructions and counseling regarding her condition or for health maintenance advice. Please see specific information pulled into the AVS if appropriate.

## 2021-07-29 ENCOUNTER — CLINICAL SUPPORT (OUTPATIENT)
Dept: FAMILY MEDICINE CLINIC | Facility: CLINIC | Age: 56
End: 2021-07-29

## 2021-07-29 PROCEDURE — 96372 THER/PROPH/DIAG INJ SC/IM: CPT | Performed by: NURSE PRACTITIONER

## 2021-07-29 RX ADMIN — KETOROLAC TROMETHAMINE 60 MG: 30 INJECTION, SOLUTION INTRAMUSCULAR; INTRAVENOUS at 14:54

## 2021-07-29 RX ADMIN — METHYLPREDNISOLONE ACETATE 80 MG: 80 INJECTION, SUSPENSION INTRA-ARTICULAR; INTRALESIONAL; INTRAMUSCULAR; SOFT TISSUE at 14:56

## 2021-08-05 ENCOUNTER — TELEPHONE (OUTPATIENT)
Dept: FAMILY MEDICINE CLINIC | Facility: CLINIC | Age: 56
End: 2021-08-05

## 2021-08-05 NOTE — TELEPHONE ENCOUNTER
Caller: Kacie Lynch    Relationship: Self    Best call back number: [phone]    What medication are you requesting: FLAGYL   What are your current symptoms: DISCHARGE  ITCHING  How long have you been experiencing symptoms: 2 DAYS     Have you had these symptoms before:    [x] Yes  [] No    Have you been treated for these symptoms before:   [x] Yes  [] No    If a prescription is needed, what is your preferred pharmacy and phone number: [id number] - [place], [place] - [address] AT [facility] OF [address] [place] & [place] - [phone]  - [phone] FX

## 2021-08-06 RX ORDER — FLUCONAZOLE 150 MG/1
TABLET ORAL
Qty: 2 TABLET | Refills: 0 | Status: SHIPPED | OUTPATIENT
Start: 2021-08-06 | End: 2021-10-26

## 2021-08-06 NOTE — TELEPHONE ENCOUNTER
Caller: Kacie Lynch    Relationship: Self    Best call back number: [phone]    What is the best time to reach you: ANY    Who are you requesting to speak with CLINICAL     What was the call regarding: PATIENT CALLING TO GET STATUS ON NEW MEDICATION REQUEST SHE SENT YESTERDAY FOR YEAST INFECTION.     Do you require a callback: YES

## 2021-08-06 NOTE — TELEPHONE ENCOUNTER
Called and spoke to patient.  She states she always gets yeast infection after taking antibiotics.  She is having vaginal discharge, tenderness and itching.  Diflucan sent.

## 2021-09-03 ENCOUNTER — TELEPHONE (OUTPATIENT)
Dept: FAMILY MEDICINE CLINIC | Facility: CLINIC | Age: 56
End: 2021-09-03

## 2021-09-03 NOTE — TELEPHONE ENCOUNTER
Patient called stating she thinks her BP may be high because she is getting a headache. I asked if she has a blood pressure machine at home and she stated she has one but her daughter has to do it for her as it is a manual one. I let patient know we don't have any available appts today but to call back with her blood pressure reading and we can go from there.

## 2021-10-24 PROCEDURE — U0004 COV-19 TEST NON-CDC HGH THRU: HCPCS | Performed by: FAMILY MEDICINE

## 2021-10-25 NOTE — PROGRESS NOTES
"Chief Complaint  Allergies    Subjective          Kacie Lynch presents to [facility]  History of Present Illness  She is here for an acute visit today complaining of \"bad allergies.\"  She states she was seen at the urgent care on 10/24/2021 for cough and congestion.  She told them that she had loss of smell which she states she gets sometimes with her allergies.  She states they tested twice for Covid, both rapid and PCR, and both were negative.  She states they put her on Promethazine DM cough syrup and prednisone Dosepak for her cough and congestion.  She states her work is requiring her to have a letter stating that she does not have Covid and that she can return to work before her quarantine is up.  She states that she has bad allergies and she has this every year.  She states she does not feel sick and just feels congested.  She denies any loss of smell now.  She says she is out of her allergy/antihistamine.    She has a history of bipolar depression and anxiety: She takes Latuda 20 mg daily with good results.  She also takes trazodone 300 mg every evening to help her sleep.    Her blood pressure is noted to be elevated today at 182/92.  She is not currently on any blood pressure medications.    She has not had any labs in a while.  She is not fasting today.  She also has a history of impaired fasting glucose and vitamin B12 deficiency.  She states she can return in a few days to have her labs drawn.  She also has history of vitamin D deficiency and needs a refill on her vitamin D.  Objective   Vital Signs:   BP (!) 182/92   Pulse 97   Temp 98.7 °F (37.1 °C)   Ht 160 cm (63\")   Wt 96.1 kg (211 lb 12.8 oz)   SpO2 98%   BMI 37.52 kg/m²     Physical Exam  Vitals reviewed.   Constitutional:       Appearance: Normal appearance. She is well-developed.   HENT:      Mouth/Throat:      Pharynx: No pharyngeal swelling, oropharyngeal exudate or posterior oropharyngeal " erythema.      Comments: Postnasal drainage noted.  Neck:      Thyroid: No thyroid mass, thyromegaly or thyroid tenderness.   Cardiovascular:      Rate and Rhythm: Normal rate and regular rhythm.      Heart sounds: No murmur heard.  No friction rub. No gallop.    Pulmonary:      Effort: Pulmonary effort is normal.      Breath sounds: Normal breath sounds. No wheezing or rhonchi.   Lymphadenopathy:      Cervical: No cervical adenopathy.   Skin:     General: Skin is warm and dry.   Neurological:      Mental Status: She is alert and oriented to person, place, and time.      Cranial Nerves: No cranial nerve deficit.   Psychiatric:         Mood and Affect: Mood and affect normal.         Behavior: Behavior normal.         Thought Content: Thought content normal. Thought content does not include homicidal or suicidal ideation.         Judgment: Judgment normal.        Result Review :                 Assessment and Plan    Diagnoses and all orders for this visit:    1. Seasonal allergic rhinitis, unspecified trigger (Primary)  Assessment & Plan:  Seasonal allergies uncontrolled, will refill her Zyrtec.  I also wrote her a letter today for her to return to work since she has had 2 Covid test that were negative.      2. Primary hypertension  Assessment & Plan:  Hypertension is newly identified.  I will start her on hydrochlorothiazide 25 mg daily.  Advised her on potential side effects.  Blood pressure will be reassessed in 3 months.    Orders:  -     CBC Auto Differential; Future  -     Comprehensive Metabolic Panel; Future  -     TSH+Free T4; Future  -     Lipid Panel; Future    3. Vitamin D deficiency  Assessment & Plan:  We will check her vitamin D level with her next labs.  I will have her continue vitamin D 50,000 units weekly at this time.    Orders:  -     Vitamin D 25 Hydroxy; Future    4. Bipolar disorder, current episode mixed, mild (HCC)  Assessment & Plan:  Psychological condition is improving with  treatment.  Continue current treatment regimen.  Psychological condition  will be reassessed at the next regular appointment.    Orders:  -     TSH+Free T4; Future    5. Impaired fasting glucose  Assessment & Plan:  We will check an A1c with her next labs.    Orders:  -     Hemoglobin A1c; Future    6. Vitamin B12 deficiency (non anemic)  Assessment & Plan:  We will check her vitamin B12 level with her next labs.    Orders:  -     Vitamin B12 & Folate; Future    Other orders  -     hydroCHLOROthiazide (HYDRODIURIL) 25 MG tablet; Take 1 tablet by mouth Daily.  Dispense: 30 tablet; Refill: 2  -     cetirizine (zyrTEC) 10 MG tablet; Take 1 tablet by mouth Daily.  Dispense: 90 tablet; Refill: 3  -     busPIRone (BUSPAR) 7.5 MG tablet; Take 1 tablet by mouth 3 (Three) Times a Day.  Dispense: 270 tablet; Refill: 1  -     dicyclomine (BENTYL) 10 MG capsule; Take 1 capsule by mouth 4 (Four) Times a Day As Needed (stomach pains).  Dispense: 60 capsule; Refill: 2  -     ergocalciferol (ERGOCALCIFEROL) 1.25 MG (84144 UT) capsule; Take 1 capsule by mouth 1 (One) Time Per Week.  Dispense: 13 capsule; Refill: 1  -     esomeprazole (nexIUM) 40 MG capsule; Take 1 capsule by mouth Every Morning Before Breakfast.  Dispense: 90 capsule; Refill: 1  -     ibuprofen (ADVIL,MOTRIN) 800 MG tablet; Take 1 tablet by mouth 3 (Three) Times a Day.  Dispense: 90 tablet; Refill: 5  -     Lurasidone HCl (Latuda) 20 MG tablet tablet; Take 1 tablet by mouth Daily.  Dispense: 90 tablet; Refill: 1  -     ondansetron ODT (ZOFRAN-ODT) 4 MG disintegrating tablet; Place 1 tablet on the tongue 4 (Four) Times a Day As Needed for Nausea or Vomiting.  Dispense: 20 tablet; Refill: 5  -     tiZANidine (ZANAFLEX) 4 MG tablet; Take 1 tablet by mouth At Night As Needed for Muscle Spasms.  Dispense: 90 tablet; Refill: 1  -     traZODone (DESYREL) 300 MG tablet; Take 1 tablet by mouth every night at bedtime.  Dispense: 90 tablet; Refill: 1      Follow Up   Return in  about 3 months (around 1/26/2022) for Next scheduled follow up.  Patient was given instructions and counseling regarding her condition or for health maintenance advice. Please see specific information pulled into the AVS if appropriate.

## 2021-10-26 ENCOUNTER — OFFICE VISIT (OUTPATIENT)
Dept: FAMILY MEDICINE CLINIC | Facility: CLINIC | Age: 56
End: 2021-10-26

## 2021-10-26 VITALS
BODY MASS INDEX: 37.53 KG/M2 | HEIGHT: 63 IN | TEMPERATURE: 98.7 F | DIASTOLIC BLOOD PRESSURE: 92 MMHG | WEIGHT: 211.8 LBS | SYSTOLIC BLOOD PRESSURE: 182 MMHG | OXYGEN SATURATION: 98 % | HEART RATE: 97 BPM

## 2021-10-26 DIAGNOSIS — F31.61 BIPOLAR DISORDER, CURRENT EPISODE MIXED, MILD (HCC): ICD-10-CM

## 2021-10-26 DIAGNOSIS — E53.8 VITAMIN B12 DEFICIENCY (NON ANEMIC): ICD-10-CM

## 2021-10-26 DIAGNOSIS — E55.9 VITAMIN D DEFICIENCY: ICD-10-CM

## 2021-10-26 DIAGNOSIS — I10 PRIMARY HYPERTENSION: ICD-10-CM

## 2021-10-26 DIAGNOSIS — R73.01 IMPAIRED FASTING GLUCOSE: ICD-10-CM

## 2021-10-26 DIAGNOSIS — J30.2 SEASONAL ALLERGIC RHINITIS, UNSPECIFIED TRIGGER: Primary | ICD-10-CM

## 2021-10-26 PROBLEM — D72.819 LEUKOPENIA: Status: ACTIVE | Noted: 2021-10-26

## 2021-10-26 PROBLEM — F31.60 MIXED BIPOLAR I DISORDER (HCC): Status: ACTIVE | Noted: 2021-10-26

## 2021-10-26 PROBLEM — G62.9 NEUROPATHY: Status: ACTIVE | Noted: 2021-10-26

## 2021-10-26 PROBLEM — F51.01 PRIMARY INSOMNIA: Status: ACTIVE | Noted: 2021-10-26

## 2021-10-26 PROBLEM — K58.9 IRRITABLE BOWEL SYNDROME: Status: ACTIVE | Noted: 2021-10-26

## 2021-10-26 PROBLEM — J30.1 ALLERGIC RHINITIS DUE TO POLLEN: Status: ACTIVE | Noted: 2021-10-26

## 2021-10-26 PROBLEM — K59.01 CONSTIPATION BY DELAYED COLONIC TRANSIT: Status: ACTIVE | Noted: 2021-10-26

## 2021-10-26 PROBLEM — K21.9 GASTROESOPHAGEAL REFLUX DISEASE: Status: ACTIVE | Noted: 2021-10-26

## 2021-10-26 PROCEDURE — 99214 OFFICE O/P EST MOD 30 MIN: CPT | Performed by: NURSE PRACTITIONER

## 2021-10-26 RX ORDER — TRAZODONE HYDROCHLORIDE 300 MG/1
300 TABLET ORAL
Qty: 90 TABLET | Refills: 1 | Status: SHIPPED | OUTPATIENT
Start: 2021-10-26 | End: 2021-10-29

## 2021-10-26 RX ORDER — ESOMEPRAZOLE MAGNESIUM 40 MG/1
40 CAPSULE, DELAYED RELEASE ORAL
Qty: 90 CAPSULE | Refills: 1 | Status: SHIPPED | OUTPATIENT
Start: 2021-10-26 | End: 2022-01-28 | Stop reason: SDUPTHER

## 2021-10-26 RX ORDER — ONDANSETRON 4 MG/1
4 TABLET, ORALLY DISINTEGRATING ORAL 4 TIMES DAILY PRN
Qty: 20 TABLET | Refills: 5 | Status: SHIPPED | OUTPATIENT
Start: 2021-10-26 | End: 2022-01-28 | Stop reason: SDUPTHER

## 2021-10-26 RX ORDER — LURASIDONE HYDROCHLORIDE 20 MG/1
20 TABLET, FILM COATED ORAL DAILY
Qty: 90 TABLET | Refills: 1 | Status: SHIPPED | OUTPATIENT
Start: 2021-10-26

## 2021-10-26 RX ORDER — HYDROCHLOROTHIAZIDE 25 MG/1
25 TABLET ORAL DAILY
Qty: 30 TABLET | Refills: 2 | Status: SHIPPED | OUTPATIENT
Start: 2021-10-26

## 2021-10-26 RX ORDER — DICYCLOMINE HYDROCHLORIDE 10 MG/1
10 CAPSULE ORAL 4 TIMES DAILY PRN
Qty: 60 CAPSULE | Refills: 2 | Status: SHIPPED | OUTPATIENT
Start: 2021-10-26 | End: 2022-02-16 | Stop reason: SDUPTHER

## 2021-10-26 RX ORDER — CETIRIZINE HYDROCHLORIDE 10 MG/1
10 TABLET ORAL DAILY
Qty: 90 TABLET | Refills: 3 | Status: SHIPPED | OUTPATIENT
Start: 2021-10-26

## 2021-10-26 RX ORDER — IBUPROFEN 800 MG/1
800 TABLET ORAL 3 TIMES DAILY
Qty: 90 TABLET | Refills: 5 | Status: SHIPPED | OUTPATIENT
Start: 2021-10-26 | End: 2022-02-16 | Stop reason: SDUPTHER

## 2021-10-26 RX ORDER — TIZANIDINE 4 MG/1
4 TABLET ORAL NIGHTLY PRN
Qty: 90 TABLET | Refills: 1 | Status: SHIPPED | OUTPATIENT
Start: 2021-10-26 | End: 2022-01-28 | Stop reason: SDUPTHER

## 2021-10-26 RX ORDER — ERGOCALCIFEROL 1.25 MG/1
1 CAPSULE ORAL WEEKLY
Qty: 13 CAPSULE | Refills: 1 | Status: SHIPPED | OUTPATIENT
Start: 2021-10-26 | End: 2022-01-17 | Stop reason: SDUPTHER

## 2021-10-26 RX ORDER — BUSPIRONE HYDROCHLORIDE 7.5 MG/1
7.5 TABLET ORAL 3 TIMES DAILY
Qty: 270 TABLET | Refills: 1 | Status: SHIPPED | OUTPATIENT
Start: 2021-10-26

## 2021-10-26 NOTE — ASSESSMENT & PLAN NOTE
Hypertension is newly identified.  I will start her on hydrochlorothiazide 25 mg daily.  Advised her on potential side effects.  Blood pressure will be reassessed in 3 months.

## 2021-10-26 NOTE — ASSESSMENT & PLAN NOTE
Seasonal allergies uncontrolled, will refill her Zyrtec.  I also wrote her a letter today for her to return to work since she has had 2 Covid test that were negative.

## 2021-10-26 NOTE — ASSESSMENT & PLAN NOTE
We will check her vitamin D level with her next labs.  I will have her continue vitamin D 50,000 units weekly at this time.

## 2021-10-29 RX ORDER — TRAZODONE HYDROCHLORIDE 300 MG/1
TABLET ORAL
Qty: 30 TABLET | Refills: 0 | Status: SHIPPED | OUTPATIENT
Start: 2021-10-29 | End: 2022-01-28 | Stop reason: SDUPTHER

## 2021-11-02 RX ORDER — ESOMEPRAZOLE MAGNESIUM 40 MG/1
CAPSULE, DELAYED RELEASE ORAL
Qty: 90 CAPSULE | Refills: 1 | OUTPATIENT
Start: 2021-11-02

## 2021-11-12 ENCOUNTER — TELEPHONE (OUTPATIENT)
Dept: FAMILY MEDICINE CLINIC | Facility: CLINIC | Age: 56
End: 2021-11-12

## 2021-11-12 NOTE — TELEPHONE ENCOUNTER
PATIENT MISSED APPOINTMENT ON 11/12/2021. CALLED NUMBER IN CHART-PATIENT RETURNED CALL AND INFORMED ME THAT HER SISTER HAD PASSED AWAY AND HER DAUGHTER WAS IN SURGERY. EXPRESSED CONDOLENCES .

## 2021-11-12 NOTE — TELEPHONE ENCOUNTER
PATIENT CANCELLED APPOINTMENT FOR 11/12/2021 TWICE IN SAME DAY. CALLED NUMBER IN CHART, [phone]. MESSAGE STATED THAT PHONE WAS NOT TAKEING   MESSAGES.

## 2021-11-22 ENCOUNTER — TELEPHONE (OUTPATIENT)
Dept: FAMILY MEDICINE CLINIC | Facility: CLINIC | Age: 56
End: 2021-11-22

## 2021-11-22 NOTE — TELEPHONE ENCOUNTER
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    Chief complaint: PAIN IN BACK    Type of visit: IN OFFICE PROCEDURE    Requested date:12/01/2021    If rescheduling, when is the original appointment: N/A    Additional notes:PATIENT NEEDS A STEROID SHOT IN HER BACK.  IN OFFICE PROCEDURE.

## 2021-11-29 ENCOUNTER — OFFICE VISIT (OUTPATIENT)
Dept: FAMILY MEDICINE CLINIC | Facility: CLINIC | Age: 56
End: 2021-11-29

## 2021-11-29 VITALS
HEIGHT: 63 IN | DIASTOLIC BLOOD PRESSURE: 84 MMHG | SYSTOLIC BLOOD PRESSURE: 144 MMHG | BODY MASS INDEX: 38.41 KG/M2 | HEART RATE: 91 BPM | OXYGEN SATURATION: 96 % | WEIGHT: 216.8 LBS | TEMPERATURE: 98.1 F

## 2021-11-29 DIAGNOSIS — M51.36 DEGENERATION OF LUMBAR INTERVERTEBRAL DISC: ICD-10-CM

## 2021-11-29 DIAGNOSIS — M54.42 CHRONIC LOW BACK PAIN WITH BILATERAL SCIATICA, UNSPECIFIED BACK PAIN LATERALITY: Primary | ICD-10-CM

## 2021-11-29 DIAGNOSIS — G89.29 CHRONIC LOW BACK PAIN WITH BILATERAL SCIATICA, UNSPECIFIED BACK PAIN LATERALITY: Primary | ICD-10-CM

## 2021-11-29 DIAGNOSIS — Z80.9 FAMILY HISTORY OF CANCER: ICD-10-CM

## 2021-11-29 DIAGNOSIS — M54.41 CHRONIC LOW BACK PAIN WITH BILATERAL SCIATICA, UNSPECIFIED BACK PAIN LATERALITY: Primary | ICD-10-CM

## 2021-11-29 DIAGNOSIS — Z12.31 ENCOUNTER FOR SCREENING MAMMOGRAM FOR MALIGNANT NEOPLASM OF BREAST: ICD-10-CM

## 2021-11-29 PROCEDURE — 99214 OFFICE O/P EST MOD 30 MIN: CPT | Performed by: NURSE PRACTITIONER

## 2021-11-29 PROCEDURE — 96372 THER/PROPH/DIAG INJ SC/IM: CPT | Performed by: NURSE PRACTITIONER

## 2021-11-29 RX ORDER — TRIAMCINOLONE ACETONIDE 40 MG/ML
40 INJECTION, SUSPENSION INTRA-ARTICULAR; INTRAMUSCULAR ONCE
Status: COMPLETED | OUTPATIENT
Start: 2021-11-29 | End: 2021-11-29

## 2021-11-29 RX ADMIN — TRIAMCINOLONE ACETONIDE 40 MG: 40 INJECTION, SUSPENSION INTRA-ARTICULAR; INTRAMUSCULAR at 15:35

## 2021-11-29 NOTE — ASSESSMENT & PLAN NOTE
Worsening of back pain, will give Kenalog 40 mg IM today.  Patient to continue ibuprofen 800 mg 3 times a day as needed and tizanidine 4 mg nightly as needed.  We will get her referred to pain management.

## 2021-11-29 NOTE — ASSESSMENT & PLAN NOTE
We discussed with her strong family history of cancer that she should have genetic counseling.  Referral for genetic counseling placed.

## 2021-11-29 NOTE — PROGRESS NOTES
"Chief Complaint  Back Pain    Subjective          Kacie Lynch presents to [facility]  History of Present Illness  She presents today for an acute visit due to chronic back pain getting worse.  She has a history of degenerative disc disease.  She states she was in pain management but she needs a referral to a new pain management.  She has been taking ibuprofen 800 mg as needed and tizanidine 4 mg nightly as needed.    She states a few weeks ago her sister .  She states they were working her up for cancer and they believe it was pancreatic cancer but the primary diagnosis and certain.  She states that her mom and her maternal aunt both passed away from breast cancer.  She states she has had an uncle who had lung cancer and another uncle who had brain cancer.  She has not gotten her mammogram for several years.  She wants to be referred to genetic counseling.  Objective   Vital Signs:   /84   Pulse 91   Temp 98.1 °F (36.7 °C)   Ht 160 cm (63\")   Wt 98.3 kg (216 lb 12.8 oz)   SpO2 96%   BMI 38.40 kg/m²     Physical Exam  Vitals reviewed.   Constitutional:       Appearance: Normal appearance. She is well-developed.   Neck:      Thyroid: No thyroid mass, thyromegaly or thyroid tenderness.   Cardiovascular:      Rate and Rhythm: Normal rate and regular rhythm.      Heart sounds: No murmur heard.  No friction rub. No gallop.    Pulmonary:      Effort: Pulmonary effort is normal.      Breath sounds: Normal breath sounds. No wheezing or rhonchi.   Lymphadenopathy:      Cervical: No cervical adenopathy.   Skin:     General: Skin is warm and dry.   Neurological:      Mental Status: She is alert and oriented to person, place, and time.      Cranial Nerves: No cranial nerve deficit.   Psychiatric:         Mood and Affect: Mood and affect normal.         Behavior: Behavior normal.         Thought Content: Thought content normal. Thought content does not include homicidal " or suicidal ideation.         Judgment: Judgment normal.        Result Review :                 Assessment and Plan    Diagnoses and all orders for this visit:    1. Chronic low back pain with bilateral sciatica, unspecified back pain laterality (Primary)  Assessment & Plan:  Worsening of back pain, will give Kenalog 40 mg IM today.  Patient to continue ibuprofen 800 mg 3 times a day as needed and tizanidine 4 mg nightly as needed.  We will get her referred to pain management.    Orders:  -     Ambulatory Referral to Pain Management  -     triamcinolone acetonide (KENALOG-40) injection 40 mg    2. Degeneration of lumbar intervertebral disc  -     Ambulatory Referral to Pain Management  -     triamcinolone acetonide (KENALOG-40) injection 40 mg    3. Encounter for screening mammogram for malignant neoplasm of breast  Comments:  Patient instructed to get her mammogram done, order placed to get scheduled.  Orders:  -     Mammo Screening Digital Tomosynthesis Bilateral With CAD; Future    4. Family history of cancer  Assessment & Plan:  We discussed with her strong family history of cancer that she should have genetic counseling.  Referral for genetic counseling placed.    Orders:  -     Ambulatory Referral to Genetic Counseling/Testing - [facility]      Follow Up   Return if symptoms worsen or fail to improve, for pt to keep follow up apt in 2 months.  Patient was given instructions and counseling regarding her condition or for health maintenance advice. Please see specific information pulled into the AVS if appropriate.

## 2021-12-27 ENCOUNTER — TELEPHONE (OUTPATIENT)
Dept: FAMILY MEDICINE CLINIC | Facility: CLINIC | Age: 56
End: 2021-12-27

## 2021-12-27 NOTE — TELEPHONE ENCOUNTER
Caller: Kacie Lynch    Relationship to patient: Self    Best call back number: [phone]    Chief complaint: MIGRAINE FOR 2 DAYS     Type of visit: SAME DAY     Requested date: 12/27/21     If rescheduling, when is the original appointment: N/A     Additional notes:PATIENT IS REQUESTING TO SEE PROVIDER FOR SEVERE MIGRAINE FOR 2 DAYS. PLEASE CALL AND ADVISE. HUB UNABLE TO WARM TRANSFER.

## 2021-12-27 NOTE — TELEPHONE ENCOUNTER
WAS UNABLE TO MAKE APPT TODAY. PT COULDN'T COME IN TOMORROW OR THE REST OF THE WEEK. PT SAID SHE WILL GO TO URGENT CARE OF THE ER

## 2022-01-03 ENCOUNTER — TELEPHONE (OUTPATIENT)
Dept: FAMILY MEDICINE CLINIC | Facility: CLINIC | Age: 57
End: 2022-01-03

## 2022-01-03 NOTE — TELEPHONE ENCOUNTER
Spoke with patient and made her aware there are no available appts today and directed her to an urgent care clinic.

## 2022-01-03 NOTE — TELEPHONE ENCOUNTER
Hub staff attempted to follow warm transfer process and was unsuccessful     Caller: Kacie Lynch    Relationship to patient: Self    Best call back number: [phone]    Patient is needing: PATIENT FEELS LIKE SHE'S GETTING PNEUMONIA AGAIN AND IS FEELING BAD AND WANTED TO BE SEEN TODAY OR HAVE HER COUGH MEDICATION SENT TO [facility].

## 2022-01-13 ENCOUNTER — TELEPHONE (OUTPATIENT)
Dept: FAMILY MEDICINE CLINIC | Facility: CLINIC | Age: 57
End: 2022-01-13

## 2022-01-13 NOTE — TELEPHONE ENCOUNTER
Hub staff attempted to follow warm transfer process and was unsuccessful     Caller: Kacie Lynch    Relationship to patient: Self    Best call back number: [phone]     Patient is needing: PATIENT CALLED IN WITH SAME DAY SYMPTOMS, SHE THINKS SHE MAY HAVE BRONCHITIS. [facility] DIDN'T FIND AVAILABILITY TODAY. PLEASE ADVISE.

## 2022-01-17 ENCOUNTER — OFFICE VISIT (OUTPATIENT)
Dept: FAMILY MEDICINE CLINIC | Facility: CLINIC | Age: 57
End: 2022-01-17

## 2022-01-17 VITALS
TEMPERATURE: 98 F | OXYGEN SATURATION: 97 % | SYSTOLIC BLOOD PRESSURE: 150 MMHG | BODY MASS INDEX: 39.51 KG/M2 | DIASTOLIC BLOOD PRESSURE: 86 MMHG | WEIGHT: 214.7 LBS | HEIGHT: 62 IN | HEART RATE: 94 BPM

## 2022-01-17 DIAGNOSIS — R05.9 COUGH: ICD-10-CM

## 2022-01-17 DIAGNOSIS — J06.9 UPPER RESPIRATORY TRACT INFECTION, UNSPECIFIED TYPE: ICD-10-CM

## 2022-01-17 DIAGNOSIS — J01.40 ACUTE NON-RECURRENT PANSINUSITIS: Primary | ICD-10-CM

## 2022-01-17 DIAGNOSIS — R68.83 CHILLS: ICD-10-CM

## 2022-01-17 DIAGNOSIS — R52 ACHES: ICD-10-CM

## 2022-01-17 DIAGNOSIS — J40 BRONCHITIS: ICD-10-CM

## 2022-01-17 LAB
EXPIRATION DATE: NORMAL
FLUAV AG NPH QL: NEGATIVE
FLUBV AG NPH QL: NEGATIVE
INTERNAL CONTROL: NORMAL
Lab: NORMAL

## 2022-01-17 PROCEDURE — 87804 INFLUENZA ASSAY W/OPTIC: CPT | Performed by: FAMILY MEDICINE

## 2022-01-17 PROCEDURE — U0004 COV-19 TEST NON-CDC HGH THRU: HCPCS | Performed by: FAMILY MEDICINE

## 2022-01-17 PROCEDURE — 99214 OFFICE O/P EST MOD 30 MIN: CPT | Performed by: FAMILY MEDICINE

## 2022-01-17 RX ORDER — ERGOCALCIFEROL 1.25 MG/1
1 CAPSULE ORAL WEEKLY
Qty: 13 CAPSULE | Refills: 1 | Status: SHIPPED | OUTPATIENT
Start: 2022-01-17

## 2022-01-17 RX ORDER — DOXYCYCLINE 100 MG/1
100 TABLET ORAL 2 TIMES DAILY
Qty: 14 TABLET | Refills: 0 | Status: SHIPPED | OUTPATIENT
Start: 2022-01-17 | End: 2022-01-24

## 2022-01-17 RX ORDER — ALBUTEROL SULFATE 90 UG/1
2 AEROSOL, METERED RESPIRATORY (INHALATION) EVERY 4 HOURS PRN
Qty: 18 G | Refills: 0 | Status: SHIPPED | OUTPATIENT
Start: 2022-01-17

## 2022-01-17 RX ORDER — BROMPHENIRAMINE MALEATE, PSEUDOEPHEDRINE HYDROCHLORIDE, AND DEXTROMETHORPHAN HYDROBROMIDE 2; 30; 10 MG/5ML; MG/5ML; MG/5ML
5 SYRUP ORAL 4 TIMES DAILY PRN
Qty: 140 ML | Refills: 0 | Status: SHIPPED | OUTPATIENT
Start: 2022-01-17 | End: 2022-01-24

## 2022-01-17 NOTE — PROGRESS NOTES
"Chief Complaint  Cough  Shortness of breath  Chills  Sinus congestion    Subjective          Kacie Lynch presents to [facility]  History of Present Illness  Patient presents today for an acute visit.  She complains of cough, shortness of breath, headache, and chills.  She reports that 2-1/2 weeks ago she was tested for COVID which was negative.  She reports that her symptoms began about 10 days ago but she was having some mild symptoms 2-1/2 weeks ago when she was tested.  She was seen at urgent care for her to be evaluated on 1/13/2022.  At that time she was given azithromycin, Tessalon Perles, and Medrol Dosepak and albuterol.  She was diagnosed with bronchitis.  She reports that she is not feeling any better.  She still has to finish out her azithromycin and Medrol Dosepak.  She reports that she was not tested for COVID on the 13th.  She reports that she had a second COVID test done around that time at a place that does COVID-19 testing however she questions if it was done correctly.  I do not see any recent negative COVID-19 test result except for back in October where she had tested negative.  She does need a refill of vitamin D  Objective   Vital Signs:   /86   Pulse 94   Temp 98 °F (36.7 °C)   Ht 157.5 cm (62\")   Wt 97.4 kg (214 lb 11.2 oz)   SpO2 97%   BMI 39.27 kg/m²     Physical Exam   General: AAO ×3, no acute distress, congested  HEENT: Normocephalic, atraumatic, no discharge in the eyes, nasal congestion noted bilaterally, there is left frontal sinus tenderness and right maxillary sinus tenderness to palpation, there is oropharyngeal erythema with no exudates, no cervical tenderness lymphadenopathy  Cardiovascular: Regular rate and rhythm without appreciable murmur  Respiratory: Clear to auscultation bilaterally no RRW.  Cough with deep inspiration  Gastrointestinal: Soft nontender nondistended with bowel sounds present  extremities: No " edema  Neurologic: CN II through XII grossly intact   Psychiatric: Normal mood and affect  Result Review :                 Assessment and Plan    Diagnoses and all orders for this visit:    1. Acute non-recurrent pansinusitis (Primary)  -     albuterol sulfate  (90 Base) MCG/ACT inhaler; Inhale 2 puffs Every 4 (Four) Hours As Needed for Wheezing or Shortness of Air.  Dispense: 18 g; Refill: 0    2. Chills  -     POCT Influenza A/B    3. Aches  -     POCT Influenza A/B    4. Upper respiratory tract infection, unspecified type  -     albuterol sulfate  (90 Base) MCG/ACT inhaler; Inhale 2 puffs Every 4 (Four) Hours As Needed for Wheezing or Shortness of Air.  Dispense: 18 g; Refill: 0    5. Cough  -     albuterol sulfate  (90 Base) MCG/ACT inhaler; Inhale 2 puffs Every 4 (Four) Hours As Needed for Wheezing or Shortness of Air.  Dispense: 18 g; Refill: 0  -     XR Chest PA & Lateral; Future  -     COVID-19,APTIMA PANTHER([place]),[facility], NP/OP SWAB IN UTM/VTM/[facility] TRANSPORT MEDIA,24 HR TAT - Swab, Nasopharynx    6. Bronchitis    Other orders  -     ergocalciferol (ERGOCALCIFEROL) 1.25 MG (83502 UT) capsule; Take 1 capsule by mouth 1 (One) Time Per Week.  Dispense: 13 capsule; Refill: 1  -     doxycycline (ADOXA) 100 MG tablet; Take 1 tablet by mouth 2 (Two) Times a Day for 7 days.  Dispense: 14 tablet; Refill: 0  -     brompheniramine-pseudoephedrine-DM 30-2-10 MG/5ML syrup; Take 5 mL by mouth 4 (Four) Times a Day As Needed for Allergies for up to 7 days.  Dispense: 140 mL; Refill: 0    I have asked for patient to finish her azithromycin and Medrol Dosepak.  I will get another chest x-ray.  Her most recent chest x-ray done on 1/13/2022 showed no acute cardiopulmonary abnormality however given persistence of symptoms and concerns for ongoing chest congestion I will go ahead and have this repeated.  I will give her doxycycline for treatment of sinusitis.  Further recommendations to follow once  results return.  She does have antibiotic allergies including allergies to amoxicillin and ciprofloxacin.  Patient instructed to call or return if she has any worsening symptoms no improvement.  I will keep her out of work today and tomorrow.  I would like to have her COVID 19 test repeated.      I spent 32 minutes caring for Kacie on this date of service. This time includes time spent by me in the following activities:reviewing tests, obtaining and/or reviewing a separately obtained history, counseling and educating the patient/family/caregiver, documenting information in the medical record and care coordination  Follow Up   Return if symptoms worsen or fail to improve.  Patient was given instructions and counseling regarding her condition or for health maintenance advice. Please see specific information pulled into the AVS if appropriate.

## 2022-01-18 LAB — SARS-COV-2 RNA PNL SPEC NAA+PROBE: NOT DETECTED

## 2022-01-20 ENCOUNTER — TRANSCRIBE ORDERS (OUTPATIENT)
Dept: ADMINISTRATIVE | Facility: HOSPITAL | Age: 57
End: 2022-01-20

## 2022-01-20 ENCOUNTER — HOSPITAL ENCOUNTER (OUTPATIENT)
Dept: GENERAL RADIOLOGY | Facility: HOSPITAL | Age: 57
Discharge: HOME | End: 2022-01-20
Admitting: FAMILY MEDICINE

## 2022-01-20 DIAGNOSIS — R05.9 COUGH: ICD-10-CM

## 2022-01-20 DIAGNOSIS — R05.9 COUGH: Primary | ICD-10-CM

## 2022-01-20 PROCEDURE — 71046 X-RAY EXAM CHEST 2 VIEWS: CPT

## 2022-01-28 ENCOUNTER — OFFICE VISIT (OUTPATIENT)
Dept: FAMILY MEDICINE CLINIC | Facility: CLINIC | Age: 57
End: 2022-01-28

## 2022-01-28 VITALS
SYSTOLIC BLOOD PRESSURE: 164 MMHG | BODY MASS INDEX: 41.3 KG/M2 | DIASTOLIC BLOOD PRESSURE: 88 MMHG | OXYGEN SATURATION: 94 % | HEART RATE: 90 BPM | HEIGHT: 62 IN | TEMPERATURE: 98.6 F | WEIGHT: 224.4 LBS

## 2022-01-28 DIAGNOSIS — F51.01 PRIMARY INSOMNIA: ICD-10-CM

## 2022-01-28 DIAGNOSIS — K21.9 GASTROESOPHAGEAL REFLUX DISEASE, UNSPECIFIED WHETHER ESOPHAGITIS PRESENT: ICD-10-CM

## 2022-01-28 DIAGNOSIS — B37.31 VAGINAL CANDIDIASIS: ICD-10-CM

## 2022-01-28 DIAGNOSIS — M54.42 CHRONIC LOW BACK PAIN WITH BILATERAL SCIATICA, UNSPECIFIED BACK PAIN LATERALITY: ICD-10-CM

## 2022-01-28 DIAGNOSIS — G89.29 CHRONIC LOW BACK PAIN WITH BILATERAL SCIATICA, UNSPECIFIED BACK PAIN LATERALITY: ICD-10-CM

## 2022-01-28 DIAGNOSIS — J40 BRONCHITIS: ICD-10-CM

## 2022-01-28 DIAGNOSIS — R05.9 COUGH: ICD-10-CM

## 2022-01-28 DIAGNOSIS — I10 PRIMARY HYPERTENSION: Primary | ICD-10-CM

## 2022-01-28 DIAGNOSIS — M54.41 CHRONIC LOW BACK PAIN WITH BILATERAL SCIATICA, UNSPECIFIED BACK PAIN LATERALITY: ICD-10-CM

## 2022-01-28 PROBLEM — G47.00 INSOMNIA: Status: RESOLVED | Noted: 2021-07-28 | Resolved: 2022-01-28

## 2022-01-28 PROCEDURE — 99214 OFFICE O/P EST MOD 30 MIN: CPT | Performed by: NURSE PRACTITIONER

## 2022-01-28 RX ORDER — BROMPHENIRAMINE MALEATE, PSEUDOEPHEDRINE HYDROCHLORIDE, AND DEXTROMETHORPHAN HYDROBROMIDE 2; 30; 10 MG/5ML; MG/5ML; MG/5ML
5 SYRUP ORAL 4 TIMES DAILY PRN
Qty: 473 ML | Refills: 0 | Status: SHIPPED | OUTPATIENT
Start: 2022-01-28 | End: 2022-02-16

## 2022-01-28 RX ORDER — TIZANIDINE 4 MG/1
4 TABLET ORAL NIGHTLY PRN
Qty: 90 TABLET | Refills: 1 | Status: SHIPPED | OUTPATIENT
Start: 2022-01-28

## 2022-01-28 RX ORDER — TRAZODONE HYDROCHLORIDE 300 MG/1
300 TABLET ORAL
Qty: 30 TABLET | Refills: 5 | Status: SHIPPED | OUTPATIENT
Start: 2022-01-28

## 2022-01-28 RX ORDER — ONDANSETRON 4 MG/1
4 TABLET, ORALLY DISINTEGRATING ORAL 4 TIMES DAILY PRN
Qty: 20 TABLET | Refills: 5 | Status: SHIPPED | OUTPATIENT
Start: 2022-01-28 | End: 2022-02-16

## 2022-01-28 RX ORDER — FLUCONAZOLE 150 MG/1
TABLET ORAL
Qty: 2 TABLET | Refills: 0 | Status: SHIPPED | OUTPATIENT
Start: 2022-01-28 | End: 2022-02-16

## 2022-01-28 RX ORDER — ESOMEPRAZOLE MAGNESIUM 40 MG/1
40 CAPSULE, DELAYED RELEASE ORAL
Qty: 90 CAPSULE | Refills: 1 | Status: SHIPPED | OUTPATIENT
Start: 2022-01-28 | End: 2022-03-16 | Stop reason: SDUPTHER

## 2022-01-28 NOTE — ASSESSMENT & PLAN NOTE
Hypertension is Blood pressure is elevated today but patient has upper respiratory infection and not feeling well..  Continue current treatment regimen.  Blood pressure will be reassessed in 3 months.

## 2022-01-28 NOTE — PROGRESS NOTES
"Chief Complaint  3 month follow up and Hypertension    Subjective          Kacie Lynch presents to [facility]  History of Present Illness   56-year-old female presents today for 3-month follow-up and med refills.    She states she has been sick with cough and congestion for 3 weeks.  She was seen by urgent care and given a Z-Alejandro.  She saw Dr. Navarrete in the office and was given albuterol inhaler.  She states that she is feeling a little better but she feels like \"it is starting to come back.\"  She is also complaining of vaginal itching without any discharge and is requesting Diflucan.  She is complaining mostly of her cough.    Hypertension: Blood pressure is elevated today 164/88.  She is on hydrochlorothiazide 25 mg daily.  She states that her blood pressure usually elevates when she does not feel well.  She denies taking any over-the-counter cold medications.    GERD: She takes Nexium 40 mg daily and needs a refill.    Chronic lower back pain: She needs a refill on testing again.  She also is on ibuprofen 800 mg 3 times daily as needed.    Insomnia: She takes trazodone 300 mg nightly and needs a refill.    She has pending labs but she is not fasting today.  Objective   Vital Signs:   /88   Pulse 90   Temp 98.6 °F (37 °C)   Ht 157.5 cm (62\")   Wt 102 kg (224 lb 6.4 oz)   SpO2 94%   BMI 41.04 kg/m²     Physical Exam  Vitals reviewed.   Constitutional:       Appearance: Normal appearance. She is well-developed.   Neck:      Thyroid: No thyroid mass, thyromegaly or thyroid tenderness.   Cardiovascular:      Rate and Rhythm: Normal rate and regular rhythm.      Heart sounds: No murmur heard.  No friction rub. No gallop.    Pulmonary:      Effort: Pulmonary effort is normal.      Breath sounds: Normal breath sounds. No wheezing or rhonchi.   Lymphadenopathy:      Cervical: No cervical adenopathy.   Skin:     General: Skin is warm and dry.   Neurological:      " Mental Status: She is alert and oriented to person, place, and time.      Cranial Nerves: No cranial nerve deficit.   Psychiatric:         Mood and Affect: Mood and affect normal.         Behavior: Behavior normal.         Thought Content: Thought content normal. Thought content does not include homicidal or suicidal ideation.         Judgment: Judgment normal.        Result Review :                 Assessment and Plan    Diagnoses and all orders for this visit:    1. Primary hypertension (Primary)  Assessment & Plan:  Hypertension is Blood pressure is elevated today but patient has upper respiratory infection and not feeling well..  Continue current treatment regimen.  Blood pressure will be reassessed in 3 months.      2. Bronchitis  Comments:  Also, prescribe her some prednisone but patient states she actually has an extra Rx because she got an extra Rx.  Advised to start taking as directed.    3. Cough  Comments:  Bromfed-DM as needed.  Discussed with her that the cough sometimes lingers for several weeks post infection.    4. Primary insomnia  Assessment & Plan:  Insomnia is on trazodone.  Patient will continue current dose.  She will follow-up in 3 months.      5. Gastroesophageal reflux disease, unspecified whether esophagitis present  Assessment & Plan:  Acid reflux controlled on Nexium, will continue current dose.      6. Chronic low back pain with bilateral sciatica, unspecified back pain laterality  Assessment & Plan:  Back pain is stable on ibuprofen and tizanidine as needed.      7. Vaginal candidiasis  Comments:  Diflucan x2 doses 72 hours apart.    Other orders  -     ondansetron ODT (ZOFRAN-ODT) 4 MG disintegrating tablet; Place 1 tablet on the tongue 4 (Four) Times a Day As Needed for Nausea or Vomiting.  Dispense: 20 tablet; Refill: 5  -     traZODone (DESYREL) 300 MG tablet; Take 1 tablet by mouth every night at bedtime.  Dispense: 30 tablet; Refill: 5  -     tiZANidine (ZANAFLEX) 4 MG tablet;  Take 1 tablet by mouth At Night As Needed for Muscle Spasms.  Dispense: 90 tablet; Refill: 1  -     esomeprazole (nexIUM) 40 MG capsule; Take 1 capsule by mouth Every Morning Before Breakfast.  Dispense: 90 capsule; Refill: 1  -     brompheniramine-pseudoephedrine-DM 30-2-10 MG/5ML syrup; Take 5 mL by mouth 4 (Four) Times a Day As Needed for Congestion or Cough.  Dispense: 473 mL; Refill: 0  -     fluconazole (Diflucan) 150 MG tablet; One immediately and repeat dose in 72 hours  Dispense: 2 tablet; Refill: 0      Follow Up   Return in about 3 months (around 4/28/2022) for Next scheduled follow up.  Patient was given instructions and counseling regarding her condition or for health maintenance advice. Please see specific information pulled into the AVS if appropriate.

## 2022-02-16 ENCOUNTER — LAB (OUTPATIENT)
Dept: FAMILY MEDICINE CLINIC | Facility: CLINIC | Age: 57
End: 2022-02-16

## 2022-02-16 ENCOUNTER — OFFICE VISIT (OUTPATIENT)
Dept: FAMILY MEDICINE CLINIC | Facility: CLINIC | Age: 57
End: 2022-02-16

## 2022-02-16 VITALS
HEIGHT: 63 IN | TEMPERATURE: 98.5 F | BODY MASS INDEX: 39.3 KG/M2 | DIASTOLIC BLOOD PRESSURE: 98 MMHG | WEIGHT: 221.8 LBS | OXYGEN SATURATION: 98 % | SYSTOLIC BLOOD PRESSURE: 178 MMHG | HEART RATE: 83 BPM

## 2022-02-16 DIAGNOSIS — R11.0 NAUSEA: ICD-10-CM

## 2022-02-16 DIAGNOSIS — G89.29 CHRONIC BILATERAL LOW BACK PAIN WITH BILATERAL SCIATICA: Primary | ICD-10-CM

## 2022-02-16 DIAGNOSIS — M54.42 CHRONIC BILATERAL LOW BACK PAIN WITH BILATERAL SCIATICA: Primary | ICD-10-CM

## 2022-02-16 DIAGNOSIS — R73.01 IMPAIRED FASTING GLUCOSE: ICD-10-CM

## 2022-02-16 DIAGNOSIS — E53.8 VITAMIN B12 DEFICIENCY (NON ANEMIC): ICD-10-CM

## 2022-02-16 DIAGNOSIS — M54.41 CHRONIC BILATERAL LOW BACK PAIN WITH BILATERAL SCIATICA: Primary | ICD-10-CM

## 2022-02-16 DIAGNOSIS — I10 PRIMARY HYPERTENSION: ICD-10-CM

## 2022-02-16 DIAGNOSIS — E55.9 VITAMIN D DEFICIENCY: ICD-10-CM

## 2022-02-16 LAB
25(OH)D3 SERPL-MCNC: 18.2 NG/ML
ALBUMIN SERPL-MCNC: 4.8 G/DL (ref 3.5–5.2)
ALBUMIN/GLOB SERPL: 2 G/DL
ALP SERPL-CCNC: 102 U/L (ref 39–117)
ALT SERPL W P-5'-P-CCNC: 11 U/L (ref 1–33)
ANION GAP SERPL CALCULATED.3IONS-SCNC: 7.6 MMOL/L (ref 5–15)
AST SERPL-CCNC: 15 U/L (ref 1–32)
BASOPHILS # BLD AUTO: 0.02 10*3/MM3 (ref 0–0.2)
BASOPHILS NFR BLD AUTO: 0.4 % (ref 0–1.5)
BILIRUB SERPL-MCNC: 0.3 MG/DL (ref 0–1.2)
BUN SERPL-MCNC: 9 MG/DL (ref 6–20)
BUN/CREAT SERPL: 13.4 (ref 7–25)
CALCIUM SPEC-SCNC: 9.9 MG/DL (ref 8.6–10.5)
CHLORIDE SERPL-SCNC: 101 MMOL/L (ref 98–107)
CHOLEST SERPL-MCNC: 190 MG/DL (ref 0–200)
CO2 SERPL-SCNC: 28.4 MMOL/L (ref 22–29)
CREAT SERPL-MCNC: 0.67 MG/DL (ref 0.57–1)
DEPRECATED RDW RBC AUTO: 40 FL (ref 37–54)
EOSINOPHIL # BLD AUTO: 0.06 10*3/MM3 (ref 0–0.4)
EOSINOPHIL NFR BLD AUTO: 1.3 % (ref 0.3–6.2)
ERYTHROCYTE [DISTWIDTH] IN BLOOD BY AUTOMATED COUNT: 12.9 % (ref 12.3–15.4)
FOLATE SERPL-MCNC: 11.2 NG/ML (ref 4.78–24.2)
GFR SERPL CREATININE-BSD FRML MDRD: 110 ML/MIN/1.73
GLOBULIN UR ELPH-MCNC: 2.4 GM/DL
GLUCOSE SERPL-MCNC: 97 MG/DL (ref 65–99)
HBA1C MFR BLD: 5.8 % (ref 4.8–5.6)
HCT VFR BLD AUTO: 37.9 % (ref 34–46.6)
HDLC SERPL-MCNC: 86 MG/DL (ref 40–60)
HGB BLD-MCNC: 13 G/DL (ref 12–15.9)
IMM GRANULOCYTES # BLD AUTO: 0.02 10*3/MM3 (ref 0–0.05)
IMM GRANULOCYTES NFR BLD AUTO: 0.4 % (ref 0–0.5)
LDLC SERPL CALC-MCNC: 81 MG/DL (ref 0–100)
LDLC/HDLC SERPL: 0.89 {RATIO}
LYMPHOCYTES # BLD AUTO: 2.26 10*3/MM3 (ref 0.7–3.1)
LYMPHOCYTES NFR BLD AUTO: 48.2 % (ref 19.6–45.3)
MCH RBC QN AUTO: 29.3 PG (ref 26.6–33)
MCHC RBC AUTO-ENTMCNC: 34.3 G/DL (ref 31.5–35.7)
MCV RBC AUTO: 85.4 FL (ref 79–97)
MONOCYTES # BLD AUTO: 0.31 10*3/MM3 (ref 0.1–0.9)
MONOCYTES NFR BLD AUTO: 6.6 % (ref 5–12)
NEUTROPHILS NFR BLD AUTO: 2.02 10*3/MM3 (ref 1.7–7)
NEUTROPHILS NFR BLD AUTO: 43.1 % (ref 42.7–76)
NRBC BLD AUTO-RTO: 0 /100 WBC (ref 0–0.2)
PLATELET # BLD AUTO: 196 10*3/MM3 (ref 140–450)
PMV BLD AUTO: 10.5 FL (ref 6–12)
POTASSIUM SERPL-SCNC: 3.7 MMOL/L (ref 3.5–5.2)
PROT SERPL-MCNC: 7.2 G/DL (ref 6–8.5)
RBC # BLD AUTO: 4.44 10*6/MM3 (ref 3.77–5.28)
SODIUM SERPL-SCNC: 137 MMOL/L (ref 136–145)
TRIGL SERPL-MCNC: 136 MG/DL (ref 0–150)
VIT B12 BLD-MCNC: 446 PG/ML (ref 211–946)
VLDLC SERPL-MCNC: 23 MG/DL (ref 5–40)
WBC NRBC COR # BLD: 4.69 10*3/MM3 (ref 3.4–10.8)

## 2022-02-16 PROCEDURE — 80061 LIPID PANEL: CPT | Performed by: NURSE PRACTITIONER

## 2022-02-16 PROCEDURE — 84439 ASSAY OF FREE THYROXINE: CPT | Performed by: NURSE PRACTITIONER

## 2022-02-16 PROCEDURE — 96372 THER/PROPH/DIAG INJ SC/IM: CPT | Performed by: NURSE PRACTITIONER

## 2022-02-16 PROCEDURE — 99214 OFFICE O/P EST MOD 30 MIN: CPT | Performed by: NURSE PRACTITIONER

## 2022-02-16 PROCEDURE — 82746 ASSAY OF FOLIC ACID SERUM: CPT | Performed by: NURSE PRACTITIONER

## 2022-02-16 PROCEDURE — 83036 HEMOGLOBIN GLYCOSYLATED A1C: CPT | Performed by: NURSE PRACTITIONER

## 2022-02-16 PROCEDURE — 80050 GENERAL HEALTH PANEL: CPT | Performed by: NURSE PRACTITIONER

## 2022-02-16 PROCEDURE — 82306 VITAMIN D 25 HYDROXY: CPT | Performed by: NURSE PRACTITIONER

## 2022-02-16 PROCEDURE — 82607 VITAMIN B-12: CPT | Performed by: NURSE PRACTITIONER

## 2022-02-16 RX ORDER — METHYLPREDNISOLONE ACETATE 80 MG/ML
80 INJECTION, SUSPENSION INTRA-ARTICULAR; INTRALESIONAL; INTRAMUSCULAR; SOFT TISSUE ONCE
Status: COMPLETED | OUTPATIENT
Start: 2022-02-16 | End: 2022-02-16

## 2022-02-16 RX ORDER — KETOROLAC TROMETHAMINE 30 MG/ML
60 INJECTION, SOLUTION INTRAMUSCULAR; INTRAVENOUS ONCE
Status: COMPLETED | OUTPATIENT
Start: 2022-02-16 | End: 2022-02-16

## 2022-02-16 RX ORDER — ONDANSETRON 8 MG/1
8 TABLET, ORALLY DISINTEGRATING ORAL EVERY 8 HOURS PRN
Qty: 20 TABLET | Refills: 0 | OUTPATIENT
Start: 2022-02-16 | End: 2022-04-26

## 2022-02-16 RX ORDER — IBUPROFEN 800 MG/1
800 TABLET ORAL 3 TIMES DAILY
Qty: 90 TABLET | Refills: 5 | Status: SHIPPED | OUTPATIENT
Start: 2022-02-16

## 2022-02-16 RX ORDER — DICYCLOMINE HYDROCHLORIDE 10 MG/1
10 CAPSULE ORAL 4 TIMES DAILY PRN
Qty: 60 CAPSULE | Refills: 2 | Status: SHIPPED | OUTPATIENT
Start: 2022-02-16

## 2022-02-16 RX ADMIN — METHYLPREDNISOLONE ACETATE 80 MG: 80 INJECTION, SUSPENSION INTRA-ARTICULAR; INTRALESIONAL; INTRAMUSCULAR; SOFT TISSUE at 14:46

## 2022-02-16 RX ADMIN — KETOROLAC TROMETHAMINE 60 MG: 30 INJECTION, SOLUTION INTRAMUSCULAR; INTRAVENOUS at 14:43

## 2022-02-16 NOTE — ASSESSMENT & PLAN NOTE
Hypertension is Worsening today but patient is in pain.  Continue current treatment regimen.  Continue current medications.  Ambulatory blood pressure monitoring.  Blood pressure will be reassessed in 4 weeks.

## 2022-02-16 NOTE — PROGRESS NOTES
"Chief Complaint  Back Pain    Subjective          Kacie Lynch presents to [facility]  History of Present Illness   56-year-old female presents today with complaints of worsening of back pain.  She has chronic lower back pain but states it has increased recently.  She states that usually if she gets a Toradol and a steroid injection it does help.  Her last steroid injection was 3 months ago.  She states her pain does radiate into her hips bilaterally but worse on the right.    She also is complaining of some nausea that started this morning.  She took a Zofran 4 mg which did not help that much.  She states that her daughter in grandson both seem to have had a stomach virus recently so she believes she has a stomach virus.    Hypertension: Her blood pressure is noted to be elevated today at 178/98.  She is on hydrochlorothiazide 25 mg daily.  We discussed that her elevated blood pressure may be due to her back pain.    She states she is fasting today and wants to do her pending labs for impaired fasting glucose, vitamin B12 deficiency, vitamin D deficiency and hypertension.  Objective   Vital Signs:   /98   Pulse 83   Temp 98.5 °F (36.9 °C)   Ht 160 cm (63\")   Wt 101 kg (221 lb 12.8 oz)   SpO2 98%   BMI 39.29 kg/m²     Physical Exam  Vitals reviewed.   Constitutional:       Appearance: Normal appearance. She is well-developed.   Neck:      Thyroid: No thyroid mass, thyromegaly or thyroid tenderness.   Cardiovascular:      Rate and Rhythm: Normal rate and regular rhythm.      Heart sounds: No murmur heard.  No friction rub. No gallop.    Pulmonary:      Effort: Pulmonary effort is normal.      Breath sounds: Normal breath sounds. No wheezing or rhonchi.   Musculoskeletal:      Lumbar back: No swelling, spasms or tenderness.   Lymphadenopathy:      Cervical: No cervical adenopathy.   Skin:     General: Skin is warm and dry.   Neurological:      Mental Status: She is " alert and oriented to person, place, and time.      Cranial Nerves: No cranial nerve deficit.   Psychiatric:         Mood and Affect: Mood and affect normal.         Behavior: Behavior normal.         Thought Content: Thought content normal. Thought content does not include homicidal or suicidal ideation.         Judgment: Judgment normal.        Result Review :                 Assessment and Plan    Diagnoses and all orders for this visit:    1. Chronic bilateral low back pain with bilateral sciatica (Primary)  Assessment & Plan:  I will give her Toradol 60 mg IM today and Depo-Medrol 80 mg IM today.  Refill of ibuprofen 800 mg 3 times a day but advised patient not to take ibuprofen today due to the Toradol injection.  Also advised to continue tizanidine nightly.    Orders:  -     ketorolac (TORADOL) injection 60 mg  -     methylPREDNISolone acetate (DEPO-medrol) injection 80 mg    2. Nausea  Comments:  I will increase her Zofran dose to 8 mg daily.  I also refilled her Bentyl as requested.    3. Primary hypertension  Assessment & Plan:  Hypertension is Worsening today but patient is in pain.  Continue current treatment regimen.  Continue current medications.  Ambulatory blood pressure monitoring.  Blood pressure will be reassessed in 4 weeks.    Orders:  -     Lipid Panel  -     TSH+Free T4  -     Comprehensive Metabolic Panel  -     CBC Auto Differential    4. Impaired fasting glucose  -     Hemoglobin A1c    5. Vitamin B12 deficiency (non anemic)  -     Vitamin B12 & Folate    6. Vitamin D deficiency  -     Vitamin D 25 Hydroxy    Other orders  -     ondansetron ODT (ZOFRAN-ODT) 8 MG disintegrating tablet; Place 1 tablet on the tongue Every 8 (Eight) Hours As Needed for Nausea or Vomiting.  Dispense: 20 tablet; Refill: 0  -     ibuprofen (ADVIL,MOTRIN) 800 MG tablet; Take 1 tablet by mouth 3 (Three) Times a Day.  Dispense: 90 tablet; Refill: 5  -     dicyclomine (BENTYL) 10 MG capsule; Take 1 capsule by mouth 4  (Four) Times a Day As Needed (stomach pains).  Dispense: 60 capsule; Refill: 2      Follow Up   Return in about 1 month (around 3/16/2022) for Next scheduled follow up.  Patient was given instructions and counseling regarding her condition or for health maintenance advice. Please see specific information pulled into the AVS if appropriate.

## 2022-02-17 LAB
T4 FREE SERPL-MCNC: 1.13 NG/DL (ref 0.93–1.7)
TSH SERPL DL<=0.05 MIU/L-ACNC: 1.1 UIU/ML (ref 0.27–4.2)

## 2022-03-16 ENCOUNTER — OFFICE VISIT (OUTPATIENT)
Dept: FAMILY MEDICINE CLINIC | Facility: CLINIC | Age: 57
End: 2022-03-16

## 2022-03-16 VITALS
SYSTOLIC BLOOD PRESSURE: 182 MMHG | BODY MASS INDEX: 41.26 KG/M2 | HEIGHT: 62 IN | HEART RATE: 88 BPM | DIASTOLIC BLOOD PRESSURE: 90 MMHG | TEMPERATURE: 97.5 F | WEIGHT: 224.2 LBS | OXYGEN SATURATION: 99 %

## 2022-03-16 DIAGNOSIS — R73.01 IMPAIRED FASTING GLUCOSE: ICD-10-CM

## 2022-03-16 DIAGNOSIS — I10 PRIMARY HYPERTENSION: Primary | ICD-10-CM

## 2022-03-16 DIAGNOSIS — E55.9 VITAMIN D DEFICIENCY: ICD-10-CM

## 2022-03-16 PROCEDURE — 99214 OFFICE O/P EST MOD 30 MIN: CPT | Performed by: NURSE PRACTITIONER

## 2022-03-16 RX ORDER — ESOMEPRAZOLE MAGNESIUM 40 MG/1
40 CAPSULE, DELAYED RELEASE ORAL
Qty: 90 CAPSULE | Refills: 1 | Status: SHIPPED | OUTPATIENT
Start: 2022-03-16 | End: 2022-03-21 | Stop reason: CLARIF

## 2022-03-16 RX ORDER — HYDRALAZINE HYDROCHLORIDE 25 MG/1
25 TABLET, FILM COATED ORAL 2 TIMES DAILY
Qty: 60 TABLET | Refills: 0 | Status: SHIPPED | OUTPATIENT
Start: 2022-03-16 | End: 2022-03-21 | Stop reason: SDUPTHER

## 2022-03-16 NOTE — PROGRESS NOTES
Chief Complaint  Back Pain and Hypertension    Subjective          Kacie Lynch presents to [facility]  History of Present Illness   56-year-old female presents today for a 1 month follow-up on back pain.  She was given a Toradol injection and a Depo-Medrol injection on her last visit last month.  She is also on ibuprofen 800 mg 3 times daily as needed.  She states her back is all better now and she is not having any more problems.    She was also having some nausea and I increased her Zofran dose to 8 mg daily.  She states her nausea has improved.    Hypertension: Her blood pressure was worsening on her last visit but she was in pain.  Her blood pressure is still very high today at 182/90.  She states she just checked it at home and it was 190 systolic.  She is on hydrochlorothiazide 25 mg daily.  She has multiple allergies to blood pressure medicines to include lisinopril, losartan, clonidine and she thinks also amlodipine.  She states most of them cause a cough and swelling in her throat.    She has borderline diabetes, her last A1c was 5.8% last month.  Her lipid panel was normal.    Vitamin D deficiency: Her vitamin D is still low at 18.  She is on vitamin D 50,000 units weekly.    Current Outpatient Medications on File Prior to Visit   Medication Sig Dispense Refill   • albuterol sulfate  (90 Base) MCG/ACT inhaler Inhale 2 puffs Every 4 (Four) Hours As Needed for Wheezing or Shortness of Air. 18 g 0   • busPIRone (BUSPAR) 7.5 MG tablet Take 1 tablet by mouth 3 (Three) Times a Day. 270 tablet 1   • cetirizine (zyrTEC) 10 MG tablet Take 1 tablet by mouth Daily. 90 tablet 3   • dicyclomine (BENTYL) 10 MG capsule Take 1 capsule by mouth 4 (Four) Times a Day As Needed (stomach pains). 60 capsule 2   • ergocalciferol (ERGOCALCIFEROL) 1.25 MG (16433 UT) capsule Take 1 capsule by mouth 1 (One) Time Per Week. 13 capsule 1   • gabapentin (NEURONTIN) 300 MG capsule Take 1  "capsule by mouth Daily.     • hydroCHLOROthiazide (HYDRODIURIL) 25 MG tablet Take 1 tablet by mouth Daily. 30 tablet 2   • ibuprofen (ADVIL,MOTRIN) 800 MG tablet Take 1 tablet by mouth 3 (Three) Times a Day. 90 tablet 5   • Lurasidone HCl (Latuda) 20 MG tablet tablet Take 1 tablet by mouth Daily. 90 tablet 1   • ondansetron ODT (ZOFRAN-ODT) 8 MG disintegrating tablet Place 1 tablet on the tongue Every 8 (Eight) Hours As Needed for Nausea or Vomiting. 20 tablet 0   • tiZANidine (ZANAFLEX) 4 MG tablet Take 1 tablet by mouth At Night As Needed for Muscle Spasms. 90 tablet 1   • traZODone (DESYREL) 300 MG tablet Take 1 tablet by mouth every night at bedtime. 30 tablet 5   • [DISCONTINUED] esomeprazole (nexIUM) 40 MG capsule Take 1 capsule by mouth Every Morning Before Breakfast. 90 capsule 1     No current facility-administered medications on file prior to visit.       Objective   Vital Signs:   BP (!) 182/90   Pulse 88   Temp 97.5 °F (36.4 °C)   Ht 157.5 cm (62\")   Wt 102 kg (224 lb 3.2 oz)   SpO2 99%   BMI 41.01 kg/m²     Physical Exam  Vitals reviewed.   Constitutional:       Appearance: Normal appearance. She is well-developed.   Neck:      Thyroid: No thyroid mass, thyromegaly or thyroid tenderness.   Cardiovascular:      Rate and Rhythm: Normal rate and regular rhythm.      Heart sounds: No murmur heard.    No friction rub. No gallop.   Pulmonary:      Effort: Pulmonary effort is normal.      Breath sounds: Normal breath sounds. No wheezing or rhonchi.   Lymphadenopathy:      Cervical: No cervical adenopathy.   Skin:     General: Skin is warm and dry.   Neurological:      Mental Status: She is alert and oriented to person, place, and time.      Cranial Nerves: No cranial nerve deficit.   Psychiatric:         Mood and Affect: Mood and affect normal.         Behavior: Behavior normal.         Thought Content: Thought content normal. Thought content does not include homicidal or suicidal ideation.         " Judgment: Judgment normal.        Result Review :     CMP    CMP 3/25/21 7/22/21 2/16/22   Glucose 100 (A) 107 (A) 97   BUN 8 8 9   Creatinine 0.61 0.62 0.67   eGFR African Am  121 110   Sodium 140 137 137   Potassium 4.1 3.3 (A) 3.7   Chloride 101 97 (A) 101   Calcium 9.7 9.5 9.9   Albumin 4.6 4.70 4.80   Total Bilirubin 0.62 0.5 0.3   Alkaline Phosphatase 96 90 102   AST (SGOT) 19 20 15   ALT (SGPT) 15 16 11   (A) Abnormal value            CBC    CBC 3/25/21 7/22/21 2/16/22   WBC 4.00 (A) 9.70 4.69   RBC 4.42 4.12 4.44   Hemoglobin 12.9 12.1 13.0   Hematocrit 39.5 35.9 37.9   MCV 89.4 87.1 85.4   MCH 29.2 29.4 29.3   MCHC 32.7 (A) 33.7 34.3   RDW 13.4 12.2 (A) 12.9   Platelets 198 239 196   (A) Abnormal value            Lipid Panel    Lipid Panel 2/16/22   Total Cholesterol 190   Triglycerides 136   HDL Cholesterol 86 (A)   VLDL Cholesterol 23   LDL Cholesterol  81   LDL/HDL Ratio 0.89   (A) Abnormal value            TSH    TSH 2/16/22   TSH 1.100           A1C Last 3 Results    HGBA1C Last 3 Results 2/16/22   Hemoglobin A1C 5.80 (A)   (A) Abnormal value                      Assessment and Plan    Diagnoses and all orders for this visit:    1. Primary hypertension (Primary)  Assessment & Plan:  Hypertension is worsening.  Medication changes per orders.  Ambulatory blood pressure monitoring.  I will start her on hydralazine 25 mg twice daily, she will continue to hydrochlorothiazide.  Blood pressure will be reassessed 1 week.      2. Impaired fasting glucose  Assessment & Plan:  Stable, she states she has been working on her diet and she has been exercising.      3. Vitamin D deficiency  Assessment & Plan:  Continue vitamin D weekly.      Other orders  -     hydrALAZINE (APRESOLINE) 25 MG tablet; Take 1 tablet by mouth 2 (Two) Times a Day.  Dispense: 60 tablet; Refill: 0  -     esomeprazole (nexIUM) 40 MG capsule; Take 1 capsule by mouth Every Morning Before Breakfast.  Dispense: 90 capsule; Refill: 1      Follow  Up   Return in about 1 week (around 3/23/2022) for Recheck uncontrolled HTN.  Patient was given instructions and counseling regarding her condition or for health maintenance advice. Please see specific information pulled into the AVS if appropriate.

## 2022-03-16 NOTE — ASSESSMENT & PLAN NOTE
Hypertension is worsening.  Medication changes per orders.  Ambulatory blood pressure monitoring.  I will start her on hydralazine 25 mg twice daily, she will continue to hydrochlorothiazide.  Blood pressure will be reassessed 1 week.

## 2022-03-21 ENCOUNTER — TELEPHONE (OUTPATIENT)
Dept: FAMILY MEDICINE CLINIC | Facility: CLINIC | Age: 57
End: 2022-03-21

## 2022-03-21 ENCOUNTER — OFFICE VISIT (OUTPATIENT)
Dept: FAMILY MEDICINE CLINIC | Facility: CLINIC | Age: 57
End: 2022-03-21

## 2022-03-21 VITALS
OXYGEN SATURATION: 97 % | HEIGHT: 62 IN | TEMPERATURE: 97.9 F | BODY MASS INDEX: 40.78 KG/M2 | DIASTOLIC BLOOD PRESSURE: 78 MMHG | WEIGHT: 221.6 LBS | SYSTOLIC BLOOD PRESSURE: 156 MMHG | HEART RATE: 95 BPM

## 2022-03-21 DIAGNOSIS — Z12.11 SCREENING FOR MALIGNANT NEOPLASM OF COLON: ICD-10-CM

## 2022-03-21 DIAGNOSIS — R51.9 NONINTRACTABLE HEADACHE, UNSPECIFIED CHRONICITY PATTERN, UNSPECIFIED HEADACHE TYPE: ICD-10-CM

## 2022-03-21 DIAGNOSIS — I10 PRIMARY HYPERTENSION: Primary | ICD-10-CM

## 2022-03-21 DIAGNOSIS — K57.90 DIVERTICULOSIS: ICD-10-CM

## 2022-03-21 DIAGNOSIS — H53.8 BLURRED VISION: ICD-10-CM

## 2022-03-21 DIAGNOSIS — R10.32 LEFT LOWER QUADRANT ABDOMINAL PAIN: ICD-10-CM

## 2022-03-21 PROCEDURE — 99214 OFFICE O/P EST MOD 30 MIN: CPT | Performed by: NURSE PRACTITIONER

## 2022-03-21 RX ORDER — HYDRALAZINE HYDROCHLORIDE 50 MG/1
50 TABLET, FILM COATED ORAL 2 TIMES DAILY
Qty: 60 TABLET | Refills: 0 | Status: SHIPPED | OUTPATIENT
Start: 2022-03-21 | End: 2022-06-03

## 2022-03-21 RX ORDER — PANTOPRAZOLE SODIUM 40 MG/1
40 TABLET, DELAYED RELEASE ORAL DAILY
Qty: 30 TABLET | Refills: 5 | Status: SHIPPED | OUTPATIENT
Start: 2022-03-21

## 2022-03-21 NOTE — PROGRESS NOTES
Pepe Broussard Complaint  Hypertension    Subjective          Kacie Lynch presents to [facility]  History of Present Illness   56-year-old female presents today for an acute visit.  She went to urgent care yesterday due to a very high blood pressure 184/91.  It was started that she complained of chest pain and that they recommended she go to the emergency room.  She states she never complained of chest pain and she denies any chest pain.  She was started on hydralazine 25 mg twice daily on her last visit with me.  She is also on Hydrochlorothiazide 25 mg daily.  She has had multiple allergies to other blood pressure medications.  She states she has been having more headaches and blurred vision especially with her high blood pressure.  She is worried that she may have something wrong with her and wants to have some scans done.  She also complains of chronic left lower quadrant pain with a history of diverticulitis.  She is wanting to have a CT scan of her abdomen as well.  She also states she is due for a repeat colonoscopy, states she had polyps in the past.  She states she has had a sister passed away from cancer and now she is found out that her other sister has some type of leukemia.    Current Outpatient Medications on File Prior to Visit   Medication Sig Dispense Refill   • albuterol sulfate  (90 Base) MCG/ACT inhaler Inhale 2 puffs Every 4 (Four) Hours As Needed for Wheezing or Shortness of Air. 18 g 0   • busPIRone (BUSPAR) 7.5 MG tablet Take 1 tablet by mouth 3 (Three) Times a Day. 270 tablet 1   • cetirizine (zyrTEC) 10 MG tablet Take 1 tablet by mouth Daily. 90 tablet 3   • dicyclomine (BENTYL) 10 MG capsule Take 1 capsule by mouth 4 (Four) Times a Day As Needed (stomach pains). 60 capsule 2   • ergocalciferol (ERGOCALCIFEROL) 1.25 MG (72198 UT) capsule Take 1 capsule by mouth 1 (One) Time Per Week. 13 capsule 1   • gabapentin (NEURONTIN) 300 MG capsule Take 1  "capsule by mouth Daily.     • hydroCHLOROthiazide (HYDRODIURIL) 25 MG tablet Take 1 tablet by mouth Daily. 30 tablet 2   • ibuprofen (ADVIL,MOTRIN) 800 MG tablet Take 1 tablet by mouth 3 (Three) Times a Day. 90 tablet 5   • Lurasidone HCl (Latuda) 20 MG tablet tablet Take 1 tablet by mouth Daily. 90 tablet 1   • ondansetron ODT (ZOFRAN-ODT) 8 MG disintegrating tablet Place 1 tablet on the tongue Every 8 (Eight) Hours As Needed for Nausea or Vomiting. 20 tablet 0   • tiZANidine (ZANAFLEX) 4 MG tablet Take 1 tablet by mouth At Night As Needed for Muscle Spasms. 90 tablet 1   • traZODone (DESYREL) 300 MG tablet Take 1 tablet by mouth every night at bedtime. 30 tablet 5   • [DISCONTINUED] esomeprazole (nexIUM) 40 MG capsule Take 1 capsule by mouth Every Morning Before Breakfast. 90 capsule 1   • [DISCONTINUED] hydrALAZINE (APRESOLINE) 25 MG tablet Take 1 tablet by mouth 2 (Two) Times a Day. 60 tablet 0     No current facility-administered medications on file prior to visit.       Objective   Vital Signs:   /78   Pulse 95   Temp 97.9 °F (36.6 °C)   Ht 157.5 cm (62\")   Wt 101 kg (221 lb 9.6 oz)   SpO2 97%   BMI 40.53 kg/m²     Physical Exam  Vitals reviewed.   Constitutional:       Appearance: Normal appearance. She is well-developed.   Neck:      Thyroid: No thyroid mass, thyromegaly or thyroid tenderness.   Cardiovascular:      Rate and Rhythm: Normal rate and regular rhythm.      Heart sounds: No murmur heard.    No friction rub. No gallop.   Pulmonary:      Effort: Pulmonary effort is normal.      Breath sounds: Normal breath sounds. No wheezing or rhonchi.   Abdominal:      Palpations: Abdomen is soft.      Tenderness: There is abdominal tenderness in the left lower quadrant.   Lymphadenopathy:      Cervical: No cervical adenopathy.   Skin:     General: Skin is warm and dry.   Neurological:      Mental Status: She is alert and oriented to person, place, and time.      Cranial Nerves: No cranial nerve " deficit.   Psychiatric:         Mood and Affect: Mood and affect normal.         Behavior: Behavior normal.         Thought Content: Thought content normal. Thought content does not include homicidal or suicidal ideation.         Judgment: Judgment normal.        Result Review :                 Assessment and Plan    Diagnoses and all orders for this visit:    1. Primary hypertension (Primary)  Assessment & Plan:  Hypertension is worsening.  Medication changes per orders.  Ambulatory blood pressure monitoring.  I will increase hydralazine dose to 50 mg twice daily and she will continue hydrochlorothiazide 25 mg daily.  Blood pressure will be reassessed in 2 weeks.      2. Nonintractable headache, unspecified chronicity pattern, unspecified headache type  Assessment & Plan:  Due to her change in headache pattern and blurred vision, will get a CT of the head.    Orders:  -     CT Head With Contrast; Future    3. Blurred vision  -     CT Head With Contrast; Future    4. Left lower quadrant abdominal pain  Comments:  I will order CT of the abdomen and pelvis.  Orders:  -     CT Abdomen Pelvis With Contrast; Future    5. Diverticulosis  -     Ambulatory Referral For Screening Colonoscopy    6. Screening for malignant neoplasm of colon  -     Ambulatory Referral For Screening Colonoscopy    Other orders  -     hydrALAZINE (APRESOLINE) 50 MG tablet; Take 1 tablet by mouth 2 (Two) Times a Day.  Dispense: 60 tablet; Refill: 0  -     pantoprazole (Protonix) 40 MG EC tablet; Take 1 tablet by mouth Daily.  Dispense: 30 tablet; Refill: 5      Follow Up   Return in about 2 weeks (around 4/4/2022) for Recheck HTN.  Patient was given instructions and counseling regarding her condition or for health maintenance advice. Please see specific information pulled into the AVS if appropriate.

## 2022-03-21 NOTE — ASSESSMENT & PLAN NOTE
Hypertension is worsening.  Medication changes per orders.  Ambulatory blood pressure monitoring.  I will increase hydralazine dose to 50 mg twice daily and she will continue hydrochlorothiazide 25 mg daily.  Blood pressure will be reassessed in 2 weeks.

## 2022-03-21 NOTE — TELEPHONE ENCOUNTER
Pt called and is worried about her bp. Pt took her bp this morning and it was 182/103. Pt switched her appt from wed till today.

## 2022-04-07 ENCOUNTER — TELEPHONE (OUTPATIENT)
Dept: SURGERY | Facility: CLINIC | Age: 57
End: 2022-04-07

## 2022-04-11 ENCOUNTER — APPOINTMENT (OUTPATIENT)
Dept: CT IMAGING | Facility: HOSPITAL | Age: 57
End: 2022-04-11

## 2022-04-11 ENCOUNTER — TELEPHONE (OUTPATIENT)
Dept: FAMILY MEDICINE CLINIC | Facility: CLINIC | Age: 57
End: 2022-04-11

## 2022-04-11 NOTE — TELEPHONE ENCOUNTER
3RD CALL TO PT TO [facility]  APPT FROM 4/7/22, NO ANSWER/VOICEMAIL NOT SET UP. PLEASE ADVISE IF ANYTHING FURTHER TO DO

## 2022-04-26 ENCOUNTER — HOSPITAL ENCOUNTER (EMERGENCY)
Facility: HOSPITAL | Age: 57
Discharge: HOME | End: 2022-04-26
Attending: EMERGENCY MEDICINE | Admitting: EMERGENCY MEDICINE

## 2022-04-26 VITALS
DIASTOLIC BLOOD PRESSURE: 82 MMHG | OXYGEN SATURATION: 98 % | BODY MASS INDEX: 38.48 KG/M2 | WEIGHT: 217.15 LBS | RESPIRATION RATE: 18 BRPM | SYSTOLIC BLOOD PRESSURE: 176 MMHG | HEIGHT: 63 IN | HEART RATE: 78 BPM | TEMPERATURE: 98.7 F

## 2022-04-26 DIAGNOSIS — E87.6 ACUTE HYPOKALEMIA: ICD-10-CM

## 2022-04-26 DIAGNOSIS — R11.2 NAUSEA, VOMITING, AND DIARRHEA: ICD-10-CM

## 2022-04-26 DIAGNOSIS — A08.4 VIRAL GASTROENTERITIS: Primary | ICD-10-CM

## 2022-04-26 DIAGNOSIS — R19.7 NAUSEA, VOMITING, AND DIARRHEA: ICD-10-CM

## 2022-04-26 LAB
ALBUMIN SERPL-MCNC: 4.7 G/DL (ref 3.5–5.2)
ALBUMIN/GLOB SERPL: 1.9 G/DL
ALP SERPL-CCNC: 108 U/L (ref 39–117)
ALT SERPL W P-5'-P-CCNC: 15 U/L (ref 1–33)
ANION GAP SERPL CALCULATED.3IONS-SCNC: 11.2 MMOL/L (ref 5–15)
AST SERPL-CCNC: 18 U/L (ref 1–32)
BASOPHILS # BLD AUTO: 0.01 10*3/MM3 (ref 0–0.2)
BASOPHILS NFR BLD AUTO: 0.3 % (ref 0–1.5)
BILIRUB SERPL-MCNC: 0.3 MG/DL (ref 0–1.2)
BILIRUB UR QL STRIP: NEGATIVE
BUN SERPL-MCNC: 9 MG/DL (ref 6–20)
BUN/CREAT SERPL: 13.2 (ref 7–25)
CALCIUM SPEC-SCNC: 9.4 MG/DL (ref 8.6–10.5)
CHLORIDE SERPL-SCNC: 104 MMOL/L (ref 98–107)
CLARITY UR: CLEAR
CO2 SERPL-SCNC: 22.8 MMOL/L (ref 22–29)
COLOR UR: YELLOW
CREAT SERPL-MCNC: 0.68 MG/DL (ref 0.57–1)
DEPRECATED RDW RBC AUTO: 40.4 FL (ref 37–54)
EGFRCR SERPLBLD CKD-EPI 2021: 102.4 ML/MIN/1.73
EOSINOPHIL # BLD AUTO: 0.08 10*3/MM3 (ref 0–0.4)
EOSINOPHIL NFR BLD AUTO: 2.1 % (ref 0.3–6.2)
ERYTHROCYTE [DISTWIDTH] IN BLOOD BY AUTOMATED COUNT: 12.6 % (ref 12.3–15.4)
GLOBULIN UR ELPH-MCNC: 2.5 GM/DL
GLUCOSE SERPL-MCNC: 118 MG/DL (ref 65–99)
GLUCOSE UR STRIP-MCNC: NEGATIVE MG/DL
HCT VFR BLD AUTO: 39 % (ref 34–46.6)
HGB BLD-MCNC: 13.1 G/DL (ref 12–15.9)
HGB UR QL STRIP.AUTO: NEGATIVE
HOLD SPECIMEN: NORMAL
HOLD SPECIMEN: NORMAL
IMM GRANULOCYTES # BLD AUTO: 0.01 10*3/MM3 (ref 0–0.05)
IMM GRANULOCYTES NFR BLD AUTO: 0.3 % (ref 0–0.5)
KETONES UR QL STRIP: NEGATIVE
LEUKOCYTE ESTERASE UR QL STRIP.AUTO: NEGATIVE
LIPASE SERPL-CCNC: 21 U/L (ref 13–60)
LYMPHOCYTES # BLD AUTO: 1.41 10*3/MM3 (ref 0.7–3.1)
LYMPHOCYTES NFR BLD AUTO: 36.9 % (ref 19.6–45.3)
MCH RBC QN AUTO: 29.3 PG (ref 26.6–33)
MCHC RBC AUTO-ENTMCNC: 33.6 G/DL (ref 31.5–35.7)
MCV RBC AUTO: 87.2 FL (ref 79–97)
MONOCYTES # BLD AUTO: 0.38 10*3/MM3 (ref 0.1–0.9)
MONOCYTES NFR BLD AUTO: 9.9 % (ref 5–12)
NEUTROPHILS NFR BLD AUTO: 1.93 10*3/MM3 (ref 1.7–7)
NEUTROPHILS NFR BLD AUTO: 50.5 % (ref 42.7–76)
NITRITE UR QL STRIP: NEGATIVE
NRBC BLD AUTO-RTO: 0 /100 WBC (ref 0–0.2)
PH UR STRIP.AUTO: 6.5 [PH] (ref 5–8)
PLATELET # BLD AUTO: 167 10*3/MM3 (ref 140–450)
PMV BLD AUTO: 9.8 FL (ref 6–12)
POTASSIUM SERPL-SCNC: 3.3 MMOL/L (ref 3.5–5.2)
PROT SERPL-MCNC: 7.2 G/DL (ref 6–8.5)
PROT UR QL STRIP: NEGATIVE
RBC # BLD AUTO: 4.47 10*6/MM3 (ref 3.77–5.28)
SODIUM SERPL-SCNC: 138 MMOL/L (ref 136–145)
SP GR UR STRIP: 1.01 (ref 1–1.03)
UROBILINOGEN UR QL STRIP: NORMAL
WBC NRBC COR # BLD: 3.82 10*3/MM3 (ref 3.4–10.8)
WHOLE BLOOD HOLD SPECIMEN: NORMAL
WHOLE BLOOD HOLD SPECIMEN: NORMAL

## 2022-04-26 PROCEDURE — 81003 URINALYSIS AUTO W/O SCOPE: CPT | Performed by: EMERGENCY MEDICINE

## 2022-04-26 PROCEDURE — 36415 COLL VENOUS BLD VENIPUNCTURE: CPT | Performed by: EMERGENCY MEDICINE

## 2022-04-26 PROCEDURE — 85025 COMPLETE CBC W/AUTO DIFF WBC: CPT | Performed by: EMERGENCY MEDICINE

## 2022-04-26 PROCEDURE — 80053 COMPREHEN METABOLIC PANEL: CPT | Performed by: EMERGENCY MEDICINE

## 2022-04-26 PROCEDURE — 96374 THER/PROPH/DIAG INJ IV PUSH: CPT

## 2022-04-26 PROCEDURE — 25010000002 ONDANSETRON PER 1 MG: Performed by: EMERGENCY MEDICINE

## 2022-04-26 PROCEDURE — 36415 COLL VENOUS BLD VENIPUNCTURE: CPT

## 2022-04-26 PROCEDURE — 83690 ASSAY OF LIPASE: CPT

## 2022-04-26 PROCEDURE — 99283 EMERGENCY DEPT VISIT LOW MDM: CPT

## 2022-04-26 RX ORDER — SODIUM CHLORIDE 0.9 % (FLUSH) 0.9 %
10 SYRINGE (ML) INJECTION AS NEEDED
Status: DISCONTINUED | OUTPATIENT
Start: 2022-04-26 | End: 2022-04-26 | Stop reason: HOSPADM

## 2022-04-26 RX ORDER — ONDANSETRON 4 MG/1
4 TABLET, ORALLY DISINTEGRATING ORAL EVERY 6 HOURS PRN
Qty: 12 TABLET | Refills: 0 | Status: SHIPPED | OUTPATIENT
Start: 2022-04-26

## 2022-04-26 RX ORDER — ONDANSETRON 2 MG/ML
4 INJECTION INTRAMUSCULAR; INTRAVENOUS ONCE
Status: COMPLETED | OUTPATIENT
Start: 2022-04-26 | End: 2022-04-26

## 2022-04-26 RX ORDER — POTASSIUM CHLORIDE 750 MG/1
10 TABLET, FILM COATED, EXTENDED RELEASE ORAL 2 TIMES DAILY
Qty: 10 TABLET | Refills: 0 | Status: SHIPPED | OUTPATIENT
Start: 2022-04-26

## 2022-04-26 RX ORDER — DIPHENOXYLATE HYDROCHLORIDE AND ATROPINE SULFATE 2.5; .025 MG/1; MG/1
2 TABLET ORAL ONCE
Status: COMPLETED | OUTPATIENT
Start: 2022-04-26 | End: 2022-04-26

## 2022-04-26 RX ORDER — DIPHENOXYLATE HYDROCHLORIDE AND ATROPINE SULFATE 2.5; .025 MG/1; MG/1
1 TABLET ORAL 4 TIMES DAILY PRN
Qty: 20 TABLET | Refills: 0 | Status: SHIPPED | OUTPATIENT
Start: 2022-04-26

## 2022-04-26 RX ADMIN — ONDANSETRON 4 MG: 2 INJECTION INTRAMUSCULAR; INTRAVENOUS at 18:29

## 2022-04-26 RX ADMIN — SODIUM CHLORIDE 1000 ML: 9 INJECTION, SOLUTION INTRAVENOUS at 18:28

## 2022-04-26 RX ADMIN — DIPHENOXYLATE HYDROCHLORIDE AND ATROPINE SULFATE 2 TABLET: 2.5; .025 TABLET ORAL at 18:28

## 2022-04-26 NOTE — DISCHARGE INSTRUCTIONS
All of your blood work and urine sample look good today and it sounds as you have the stomach virus that is going around the community.    Eat a bland, boring diet and sip on fluids to stay hydrated this week.    You can take the ondansetron (Zofran) dissolvable pill under the tongue as needed for nausea and vomiting, and you can use Lomotil as needed for severe diarrhea.

## 2022-04-26 NOTE — ED PROVIDER NOTES
Time: 1810  Arrived by: Private vehicle  Chief Complaint: Nausea, vomiting, diarrhea  History provided by: Patient    History of Present Illness:  Patient is a 56 y.o. year old female that presents to the emergency department with acute onset of nausea and vomiting and diarrhea and diffuse crampy generalized abdominal pains for the past 3 or 4 days at home.    Patient reports having numerous episodes of diarrhea today, greater than 10 times, and yesterday had several episodes of vomiting but this seems to be improving today.    She denies any recent sick contacts or any suspicious food exposures.    Emesis and diarrhea showing no bleeding today.    She states she has not really been able to keep anything down by mouth including no fluids.        Similar Symptoms Previously: No  Recently seen: No      Patient Care Team  Primary Care Provider: Kaylan Cochran    Past Medical History:   Bipolar disorder, diverticulitis, hypertension      Social History     Socioeconomic History   • Marital status: Single   Tobacco Use   • Smoking status: Never Smoker   • Smokeless tobacco: Never Used   Vaping Use   • Vaping Use: Never used   Substance and Sexual Activity   • Alcohol use: Not Currently   • Drug use: Not Currently   • Sexual activity: Defer         Social History     Socioeconomic History   • Marital status: Single   Tobacco Use   • Smoking status: Never Smoker   • Smokeless tobacco: Never Used   Vaping Use   • Vaping Use: Never used   Substance and Sexual Activity   • Alcohol use: Not Currently   • Drug use: Not Currently   • Sexual activity: Defer         Allergies   Allergen Reactions   • Amlodipine Cough   • Amoxicillin Rash   • Cariprazine Unknown - High Severity and Rash   • Ciprofloxacin Rash   • Clonidine Derivatives Cough   • Lamotrigine GI Intolerance and Cough   • Latex Rash   • Lisinopril Cough   • Losartan Cough   • Oxycodone-Acetaminophen Itching     Past Medical History:   Diagnosis Date   • Abdominal  hernia 01/05/2021   • Allergies    • Bipolar disorder (HCC)    • Chest pain    • Depression 02/25/2020 2009   • Diverticulitis    • Hair loss 01/05/2021   • Heart murmur     CHILD   • Hypertension 06/02/2014   • Leukocytopenia 10/09/2020   • Urinary incontinence 06/26/2014   • Vitamin D deficiency 06/04/2014     Past Surgical History:   Procedure Laterality Date   • ABDOMINAL HERNIA REPAIR     • BLADDER SURGERY      TENSION FREE VAGINAL TAPING   • CHOLECYSTECTOMY  09/20/2012   • COLONOSCOPY  06/26/2018   • HYSTERECTOMY      PARTIAL   • TUBAL ABDOMINAL LIGATION  2007     Family History   Problem Relation Age of Onset   • Breast cancer Mother 72   • Breast cancer Other 60   • Stroke Other    • Lung cancer Other 68   • Stroke Other        Home Medications:  Prior to Admission medications    Medication Sig Start Date End Date Taking? Authorizing Provider   albuterol sulfate  (90 Base) MCG/ACT inhaler Inhale 2 puffs Every 4 (Four) Hours As Needed for Wheezing or Shortness of Air. 1/17/22   Pato Navarrete DO   busPIRone (BUSPAR) 7.5 MG tablet Take 1 tablet by mouth 3 (Three) Times a Day. 10/26/21   Kaylan Cochran APRN   cetirizine (zyrTEC) 10 MG tablet Take 1 tablet by mouth Daily. 10/26/21   Kaylan Cochran APRN   dicyclomine (BENTYL) 10 MG capsule Take 1 capsule by mouth 4 (Four) Times a Day As Needed (stomach pains). 2/16/22   Kaylan Cochran APRN   ergocalciferol (ERGOCALCIFEROL) 1.25 MG (26155 UT) capsule Take 1 capsule by mouth 1 (One) Time Per Week. 1/17/22   Pato Navarrete DO   gabapentin (NEURONTIN) 300 MG capsule Take 1 capsule by mouth Daily.    Provider, MD Juan Carlos   hydrALAZINE (APRESOLINE) 50 MG tablet Take 1 tablet by mouth 2 (Two) Times a Day. 3/21/22   Kaylan Cochran APRN   hydroCHLOROthiazide (HYDRODIURIL) 25 MG tablet Take 1 tablet by mouth Daily. 10/26/21   Kaylan Cochran APRN   ibuprofen (ADVIL,MOTRIN) 800 MG tablet Take 1 tablet by mouth 3  "(Three) Times a Day. 2/16/22   Kaylan Cochran APRN   Lurasidone HCl (Latuda) 20 MG tablet tablet Take 1 tablet by mouth Daily. 10/26/21   Kaylan Cochran APRN   ondansetron ODT (ZOFRAN-ODT) 8 MG disintegrating tablet Place 1 tablet on the tongue Every 8 (Eight) Hours As Needed for Nausea or Vomiting. 2/16/22   Kaylan Cochran APRN   pantoprazole (Protonix) 40 MG EC tablet Take 1 tablet by mouth Daily. 3/21/22   Kaylan Cochran APRN   tiZANidine (ZANAFLEX) 4 MG tablet Take 1 tablet by mouth At Night As Needed for Muscle Spasms. 1/28/22   Kaylan Cochran APRN   traZODone (DESYREL) 300 MG tablet Take 1 tablet by mouth every night at bedtime. 1/28/22   Kaylan Cochran APRN        Record Review:  I have reviewed the patient's records in [facility].     Review of Systems:  Review of Systems     I performed a 10 point review of systems which was all negative, except for the positives found in the HPI above.    Physical Exam:  /82 (BP Location: Right arm, Patient Position: Sitting)   Pulse 78   Temp 98.7 °F (37.1 °C) (Oral)   Resp 18   Ht 160 cm (63\")   Wt 98.5 kg (217 lb 2.5 oz)   LMP  (LMP Unknown)   SpO2 98%   BMI 38.47 kg/m²     Physical Exam   General: Awake alert and in mild to moderate distress    HEENT: Head normocephalic atraumatic, eyes PERRLA EOMI, nose normal, oropharynx normal.  Mucous membranes look dry, dehydrated.    Neck: Supple full range of motion, no meningismus, no lymphadenopathy    Heart: Regular rate and rhythm, no murmurs or rubs, 2+ radial pulses bilaterally    Lungs: Clear to auscultation bilaterally without wheezes or crackles, no respiratory distress    Abdomen: Soft, mildly tender diffusely, generalized tenderness but no localizing tenderness,, nondistended, no rebound or guarding    Skin: Warm, dry, no rash    Musculoskeletal: Normal range of motion, no lower extremity edema    Neurologic: Oriented x3, no motor deficits no sensory " deficits    Psychiatric: Mood appears stable, no psychosis        Medications in the Emergency Department:  Medications   sodium chloride 0.9 % bolus 1,000 mL (0 mL Intravenous Stopped 4/26/22 1931)   ondansetron (ZOFRAN) injection 4 mg (4 mg Intravenous Given 4/26/22 1829)   diphenoxylate-atropine (LOMOTIL) 2.5-0.025 MG per tablet 2 tablet (2 tablets Oral Given 4/26/22 1828)        Labs  Lab Results (last 24 hours)     Procedure Component Value Units Date/Time    CBC & Differential [[id number]]  (Normal) Collected: 04/26/22 1609    Specimen: Blood from Arm, Right Updated: 04/26/22 1631    Narrative:      The following orders were created for panel order CBC & Differential.  Procedure                               Abnormality         Status                     ---------                               -----------         ------                     CBC Auto Differential[[id number]]        Normal              Final result                 Please view results for these tests on the individual orders.    Comprehensive Metabolic Panel [[id number]]  (Abnormal) Collected: 04/26/22 1609    Specimen: Blood from Arm, Right Updated: 04/26/22 1647     Glucose 118 mg/dL      BUN 9 mg/dL      Creatinine 0.68 mg/dL      Sodium 138 mmol/L      Potassium 3.3 mmol/L      Chloride 104 mmol/L      CO2 22.8 mmol/L      Calcium 9.4 mg/dL      Total Protein 7.2 g/dL      Albumin 4.70 g/dL      ALT (SGPT) 15 U/L      AST (SGOT) 18 U/L      Alkaline Phosphatase 108 U/L      Total Bilirubin 0.3 mg/dL      Globulin 2.5 gm/dL      A/G Ratio 1.9 g/dL      BUN/Creatinine Ratio 13.2     Anion Gap 11.2 mmol/L      eGFR 102.4 mL/min/1.73      Comment: National Kidney Foundation and American Society of Nephrology (ASN) Task Force recommended calculation based on the Chronic Kidney Disease Epidemiology Collaboration (CKD-EPI) equation refit without adjustment for race.       Narrative:      GFR Normal >60  Chronic Kidney Disease <60  Kidney Failure <15       Lipase [[id number]]  (Normal) Collected: 04/26/22 1609    Specimen: Blood from Arm, Right Updated: 04/26/22 1647     Lipase 21 U/L     CBC Auto Differential [[id number]]  (Normal) Collected: 04/26/22 1609    Specimen: Blood from Arm, Right Updated: 04/26/22 1631     WBC 3.82 10*3/mm3      RBC 4.47 10*6/mm3      Hemoglobin 13.1 g/dL      Hematocrit 39.0 %      MCV 87.2 fL      MCH 29.3 pg      MCHC 33.6 g/dL      RDW 12.6 %      RDW-SD 40.4 fl      MPV 9.8 fL      Platelets 167 10*3/mm3      Neutrophil % 50.5 %      Lymphocyte % 36.9 %      Monocyte % 9.9 %      Eosinophil % 2.1 %      Basophil % 0.3 %      Immature Grans % 0.3 %      Neutrophils, Absolute 1.93 10*3/mm3      Lymphocytes, Absolute 1.41 10*3/mm3      Monocytes, Absolute 0.38 10*3/mm3      Eosinophils, Absolute 0.08 10*3/mm3      Basophils, Absolute 0.01 10*3/mm3      Immature Grans, Absolute 0.01 10*3/mm3      nRBC 0.0 /100 WBC     Urinalysis With Microscopic If Indicated (No Culture) - Urine, Clean Catch [[id number]]  (Normal) Collected: 04/26/22 1614    Specimen: Urine, Clean Catch Updated: 04/26/22 1632     Color, UA Yellow     Appearance, UA Clear     pH, UA 6.5     Specific Gravity, UA 1.011     Glucose, UA Negative     Ketones, UA Negative     Bilirubin, UA Negative     Blood, UA Negative     Protein, UA Negative     Leuk Esterase, UA Negative     Nitrite, UA Negative     Urobilinogen, UA 0.2 E.U./dL    Narrative:      Urine microscopic not indicated.           Imaging:  No Radiology Exams Resulted Within Past 24 Hours     Procedures:  Procedures    Progress                            Medical Decision Making:  MDM     In my differential diagnosis of this patient with abdominal pain, I considered viral gastroenteritis, acute gastritis, GERD exacerbation with esophagitis, peptic ulcer disease, pancreatitis, cholecystitis, appendicitis.        This patient is a pleasant 56-year-old female presenting with acute onset of a 3 or 4-day history of nausea  and vomiting and diarrhea and crampy abdominal pains.    I am hydrating with IV fluids and giving her meds for symptom relief including Zofran and some Lomotil.    I reviewed her vital signs which are within normal limits except for hypertension.    She has a benign exam abdominal exam, without any guarding or rebound or any localizing tenderness, but does have mild tenderness diffusely consistent with likely viral gastroenteritis.    All of her lab work is reassuring today including normal white blood cell count of 3.8, normal LFTs and renal function and lipase and negative urinalysis.    She has mildly low potassium at 3.3 and I will call in some potassium chloride prescription this week.        Final diagnoses:   Viral gastroenteritis   Nausea, vomiting, and diarrhea   Acute hypokalemia        Disposition:  ED Disposition     ED Disposition   Discharge    Condition   Stable    Comment   --                    Twan Cummings MD  04/26/22 9930

## 2022-06-03 RX ORDER — HYDRALAZINE HYDROCHLORIDE 50 MG/1
TABLET, FILM COATED ORAL
Qty: 180 TABLET | Refills: 0 | Status: SHIPPED | OUTPATIENT
Start: 2022-06-03

## 2022-08-31 ENCOUNTER — TRANSCRIBE ORDERS (OUTPATIENT)
Dept: ADMINISTRATIVE | Facility: HOSPITAL | Age: 57
End: 2022-08-31

## 2022-08-31 DIAGNOSIS — Z12.31 SCREENING MAMMOGRAM FOR BREAST CANCER: Primary | ICD-10-CM

## 2022-10-07 ENCOUNTER — APPOINTMENT (OUTPATIENT)
Dept: MAMMOGRAPHY | Facility: HOSPITAL | Age: 57
End: 2022-10-07

## 2023-01-16 ENCOUNTER — HOSPITAL ENCOUNTER (OUTPATIENT)
Dept: MAMMOGRAPHY | Facility: HOSPITAL | Age: 58
Discharge: HOME | End: 2023-01-16

## 2023-02-07 ENCOUNTER — HOSPITAL ENCOUNTER (OUTPATIENT)
Dept: MAMMOGRAPHY | Facility: HOSPITAL | Age: 58
Discharge: HOME | End: 2023-02-07

## 2023-02-07 DIAGNOSIS — Z12.31 SCREENING MAMMOGRAM FOR BREAST CANCER: ICD-10-CM

## 2023-02-07 PROCEDURE — 77063 BREAST TOMOSYNTHESIS BI: CPT

## 2023-02-07 PROCEDURE — 77067 SCR MAMMO BI INCL CAD: CPT

## 2023-08-07 ENCOUNTER — HOSPITAL ENCOUNTER (EMERGENCY)
Facility: HOSPITAL | Age: 58
Discharge: HOME | End: 2023-08-07
Attending: EMERGENCY MEDICINE | Admitting: EMERGENCY MEDICINE

## 2023-08-07 ENCOUNTER — APPOINTMENT (OUTPATIENT)
Dept: GENERAL RADIOLOGY | Facility: HOSPITAL | Age: 58
End: 2023-08-07

## 2023-08-07 VITALS
RESPIRATION RATE: 16 BRPM | WEIGHT: 218.48 LBS | DIASTOLIC BLOOD PRESSURE: 94 MMHG | OXYGEN SATURATION: 97 % | BODY MASS INDEX: 38.71 KG/M2 | SYSTOLIC BLOOD PRESSURE: 172 MMHG | HEIGHT: 63 IN | HEART RATE: 89 BPM | TEMPERATURE: 97.8 F

## 2023-08-07 DIAGNOSIS — I10 HYPERTENSION, UNSPECIFIED TYPE: Primary | ICD-10-CM

## 2023-08-07 LAB
ALBUMIN SERPL-MCNC: 4.8 G/DL (ref 3.5–5.2)
ALBUMIN/GLOB SERPL: 1.9 G/DL
ALP SERPL-CCNC: 93 U/L (ref 39–117)
ALT SERPL W P-5'-P-CCNC: 12 U/L (ref 1–33)
ANION GAP SERPL CALCULATED.3IONS-SCNC: 9.6 MMOL/L (ref 5–15)
AST SERPL-CCNC: 15 U/L (ref 1–32)
BASOPHILS # BLD AUTO: 0.01 10*3/MM3 (ref 0–0.2)
BASOPHILS NFR BLD AUTO: 0.3 % (ref 0–1.5)
BILIRUB SERPL-MCNC: 0.3 MG/DL (ref 0–1.2)
BUN SERPL-MCNC: 10 MG/DL (ref 6–20)
BUN/CREAT SERPL: 12.8 (ref 7–25)
CALCIUM SPEC-SCNC: 9.9 MG/DL (ref 8.6–10.5)
CHLORIDE SERPL-SCNC: 101 MMOL/L (ref 98–107)
CO2 SERPL-SCNC: 28.4 MMOL/L (ref 22–29)
CREAT SERPL-MCNC: 0.78 MG/DL (ref 0.57–1)
DEPRECATED RDW RBC AUTO: 43.4 FL (ref 37–54)
EGFRCR SERPLBLD CKD-EPI 2021: 88.7 ML/MIN/1.73
EOSINOPHIL # BLD AUTO: 0.04 10*3/MM3 (ref 0–0.4)
EOSINOPHIL NFR BLD AUTO: 1.2 % (ref 0.3–6.2)
ERYTHROCYTE [DISTWIDTH] IN BLOOD BY AUTOMATED COUNT: 13.3 % (ref 12.3–15.4)
GLOBULIN UR ELPH-MCNC: 2.5 GM/DL
GLUCOSE SERPL-MCNC: 85 MG/DL (ref 65–99)
HCT VFR BLD AUTO: 38.3 % (ref 34–46.6)
HGB BLD-MCNC: 12.8 G/DL (ref 12–15.9)
IMM GRANULOCYTES # BLD AUTO: 0.01 10*3/MM3 (ref 0–0.05)
IMM GRANULOCYTES NFR BLD AUTO: 0.3 % (ref 0–0.5)
LYMPHOCYTES # BLD AUTO: 1.53 10*3/MM3 (ref 0.7–3.1)
LYMPHOCYTES NFR BLD AUTO: 44.1 % (ref 19.6–45.3)
MCH RBC QN AUTO: 29.6 PG (ref 26.6–33)
MCHC RBC AUTO-ENTMCNC: 33.4 G/DL (ref 31.5–35.7)
MCV RBC AUTO: 88.7 FL (ref 79–97)
MONOCYTES # BLD AUTO: 0.26 10*3/MM3 (ref 0.1–0.9)
MONOCYTES NFR BLD AUTO: 7.5 % (ref 5–12)
NEUTROPHILS NFR BLD AUTO: 1.62 10*3/MM3 (ref 1.7–7)
NEUTROPHILS NFR BLD AUTO: 46.6 % (ref 42.7–76)
NRBC BLD AUTO-RTO: 0 /100 WBC (ref 0–0.2)
PLATELET # BLD AUTO: 200 10*3/MM3 (ref 140–450)
PMV BLD AUTO: 9.8 FL (ref 6–12)
POTASSIUM SERPL-SCNC: 3.7 MMOL/L (ref 3.5–5.2)
PROT SERPL-MCNC: 7.3 G/DL (ref 6–8.5)
QT INTERVAL: 368 MS
RBC # BLD AUTO: 4.32 10*6/MM3 (ref 3.77–5.28)
SODIUM SERPL-SCNC: 139 MMOL/L (ref 136–145)
TROPONIN T SERPL HS-MCNC: 6 NG/L
WBC NRBC COR # BLD: 3.47 10*3/MM3 (ref 3.4–10.8)

## 2023-08-07 PROCEDURE — 99284 EMERGENCY DEPT VISIT MOD MDM: CPT

## 2023-08-07 PROCEDURE — 84484 ASSAY OF TROPONIN QUANT: CPT

## 2023-08-07 PROCEDURE — 80053 COMPREHEN METABOLIC PANEL: CPT

## 2023-08-07 PROCEDURE — 71046 X-RAY EXAM CHEST 2 VIEWS: CPT

## 2023-08-07 PROCEDURE — 85025 COMPLETE CBC W/AUTO DIFF WBC: CPT

## 2023-08-07 PROCEDURE — 36415 COLL VENOUS BLD VENIPUNCTURE: CPT

## 2023-08-07 PROCEDURE — 93005 ELECTROCARDIOGRAM TRACING: CPT

## 2023-08-07 NOTE — Clinical Note
[facility] EMERGENCY ROOM  [address]  [place] [address]  Phone: [phone]    Kacie Lynch was seen and treated in our emergency department on 8/7/2023.  She may return to work on 08/08/2023.         Thank you for choosing [facility].    Renny Mullen PA-C

## 2023-08-07 NOTE — ED PROVIDER NOTES
Time: 12:55 PM EDT  Date of encounter:  8/7/2023  Independent Historian/Clinical History and Information was obtained by:   Patient    History is limited by: N/A    Chief Complaint: Hypertension      History of Present Illness:  Patient is a 57 y.o. year old female who presents to the emergency department for evaluation of hypertension that began 1 week ago.  Patient states she stopped taking her hydralazine because it made her itch as she is allergic to many blood pressure medications per the patient.  Patient denies trouble breathing just states that she was itching all over.  Patient states that her lips did not swell.  Patient states that she will follow-up tomorrow with her primary care provider and have her blood pressure regimen adjusted.  Patient denies chest pain, shortness of breath, fever, headache, nausea, and vomiting.  I    HPI    Patient Care Team  Primary Care Provider: Provider, No Known    Past Medical History:     Allergies   Allergen Reactions    Amlodipine Cough    Amoxicillin Rash    Cariprazine Unknown - High Severity and Rash    Ciprofloxacin Rash    Clonidine Derivatives Cough    Lamotrigine GI Intolerance and Cough    Latex Rash    Lisinopril Cough    Losartan Cough    Oxycodone-Acetaminophen Itching     Past Medical History:   Diagnosis Date    Abdominal hernia 01/05/2021    Allergies     Bipolar disorder     Chest pain     Depression 02/25/2020    2009    Diverticulitis     Hair loss 01/05/2021    Heart murmur     CHILD    Hypertension 06/02/2014    Leukocytopenia 10/09/2020    Urinary incontinence 06/26/2014    Vitamin D deficiency 06/04/2014     Past Surgical History:   Procedure Laterality Date    ABDOMINAL HERNIA REPAIR      BLADDER SURGERY      TENSION FREE VAGINAL TAPING    CHOLECYSTECTOMY  09/20/2012    COLONOSCOPY  06/26/2018    HYSTERECTOMY      PARTIAL    TUBAL ABDOMINAL LIGATION  2007     Family History   Problem Relation Age of Onset    Breast cancer Mother 72    Breast cancer  Other 60    Stroke Other     Lung cancer Other 68    Stroke Other        Home Medications:  Prior to Admission medications    Medication Sig Start Date End Date Taking? Authorizing Provider   albuterol sulfate  (90 Base) MCG/ACT inhaler Inhale 2 puffs Every 4 (Four) Hours As Needed for Wheezing or Shortness of Air. 1/17/22   Pato Navarrete DO   busPIRone (BUSPAR) 7.5 MG tablet Take 1 tablet by mouth 3 (Three) Times a Day. 10/26/21   Kaylan Cochran APRN   cetirizine (zyrTEC) 10 MG tablet Take 1 tablet by mouth Daily. 10/26/21   Kaylan Cochran APRN   dicyclomine (BENTYL) 10 MG capsule Take 1 capsule by mouth 4 (Four) Times a Day As Needed (stomach pains). 2/16/22   Kaylan Cochran APRN   diphenoxylate-atropine (LOMOTIL) 2.5-0.025 MG per tablet Take 1 tablet by mouth 4 (Four) Times a Day As Needed for Diarrhea. 4/26/22   Twan Cummings MD   ergocalciferol (ERGOCALCIFEROL) 1.25 MG (84771 UT) capsule Take 1 capsule by mouth 1 (One) Time Per Week. 1/17/22   Pato Navarrete DO   gabapentin (NEURONTIN) 300 MG capsule Take 1 capsule by mouth Daily.    Provider, MD Juan Carlos   hydrALAZINE (APRESOLINE) 50 MG tablet Take 1 tablet by mouth twice daily 6/3/22   Weston Jim APRN   hydroCHLOROthiazide (HYDRODIURIL) 25 MG tablet Take 1 tablet by mouth Daily. 10/26/21   Kaylan Cochran APRN   ibuprofen (ADVIL,MOTRIN) 800 MG tablet Take 1 tablet by mouth 3 (Three) Times a Day. 2/16/22   Kaylan Cochran APRN   Lurasidone HCl (Latuda) 20 MG tablet tablet Take 1 tablet by mouth Daily. 10/26/21   Kaylan Cochran APRN   ondansetron ODT (ZOFRAN-ODT) 4 MG disintegrating tablet Place 1 tablet on the tongue Every 6 (Six) Hours As Needed for Nausea or Vomiting. 4/26/22   Twan Cummings MD   pantoprazole (Protonix) 40 MG EC tablet Take 1 tablet by mouth Daily. 3/21/22   Kaylan Cochran APRN   potassium chloride 10 MEQ CR tablet Take 1 tablet by mouth 2 (Two) Times a Day. 4/26/22   " Twan Cummings MD   tiZANidine (ZANAFLEX) 4 MG tablet Take 1 tablet by mouth At Night As Needed for Muscle Spasms. 1/28/22   Kaylan Cochran APRN   traZODone (DESYREL) 300 MG tablet Take 1 tablet by mouth every night at bedtime. 1/28/22   Kaylan Cochran APRN        Social History:   Social History     Tobacco Use    Smoking status: Never    Smokeless tobacco: Never   Vaping Use    Vaping Use: Never used   Substance Use Topics    Alcohol use: Not Currently    Drug use: Not Currently         Review of Systems:  Review of Systems   Constitutional:  Negative for chills and fever.   HENT:  Negative for congestion, rhinorrhea and sore throat.    Eyes:  Negative for pain and visual disturbance.   Respiratory:  Negative for apnea, cough, chest tightness and shortness of breath.    Cardiovascular:  Negative for chest pain and palpitations.   Gastrointestinal:  Negative for abdominal pain, diarrhea, nausea and vomiting.   Genitourinary:  Negative for difficulty urinating and dysuria.   Musculoskeletal:  Negative for joint swelling and myalgias.   Skin:  Negative for color change.   Neurological:  Negative for seizures and headaches.   Psychiatric/Behavioral:  The patient is nervous/anxious.    All other systems reviewed and are negative.     Physical Exam:  /94   Pulse 89   Temp 97.8 øF (36.6 øC) (Oral)   Resp 16   Ht 160 cm (63\")   Wt 99.1 kg (218 lb 7.6 oz)   LMP  (LMP Unknown)   SpO2 97%   BMI 38.70 kg/mý     Physical Exam  Vitals and nursing note reviewed.   Constitutional:       General: She is not in acute distress.     Appearance: Normal appearance. She is not toxic-appearing.   HENT:      Head: Normocephalic and atraumatic.      Jaw: There is normal jaw occlusion.   Eyes:      General: Lids are normal.      Extraocular Movements: Extraocular movements intact.      Conjunctiva/sclera: Conjunctivae normal.      Pupils: Pupils are equal, round, and reactive to light.   Cardiovascular:      " Rate and Rhythm: Normal rate and regular rhythm.      Pulses: Normal pulses.      Heart sounds: Normal heart sounds.   Pulmonary:      Effort: Pulmonary effort is normal. No respiratory distress.      Breath sounds: Normal breath sounds. No wheezing or rhonchi.   Abdominal:      General: Abdomen is flat.      Palpations: Abdomen is soft.      Tenderness: There is no abdominal tenderness. There is no guarding or rebound.   Musculoskeletal:         General: Normal range of motion.      Cervical back: Normal range of motion and neck supple.      Right lower leg: No edema.      Left lower leg: No edema.   Skin:     General: Skin is warm and dry.   Neurological:      Mental Status: She is alert and oriented to person, place, and time. Mental status is at baseline.   Psychiatric:      Comments: Anxious                Procedures:  Procedures      Medical Decision Making:      Comorbidities that affect care:    Hypertension    External Notes reviewed:    Previous Clinic Note: 3/21/2022 for primary hypertension      The following orders were placed and all results were independently analyzed by me:  Orders Placed This Encounter   Procedures    XR Chest 2 View    Comprehensive Metabolic Panel    Single High Sensitivity Troponin T    CBC Auto Differential    ECG 12 Lead Other; Palpitations    CBC & Differential       Medications Given in the Emergency Department:  Medications - No data to display     ED Course:         Labs:    Lab Results (last 24 hours)       Procedure Component Value Units Date/Time    CBC & Differential [[id number]]  (Abnormal) Collected: 08/07/23 1206    Specimen: Blood Updated: 08/07/23 1212    Narrative:      The following orders were created for panel order CBC & Differential.  Procedure                               Abnormality         Status                     ---------                               -----------         ------                     CBC Auto Differential[[id number]]        Abnormal             Final result                 Please view results for these tests on the individual orders.    Comprehensive Metabolic Panel [[id number]] Collected: 08/07/23 1206    Specimen: Blood Updated: 08/07/23 1230     Glucose 85 mg/dL      BUN 10 mg/dL      Creatinine 0.78 mg/dL      Sodium 139 mmol/L      Potassium 3.7 mmol/L      Chloride 101 mmol/L      CO2 28.4 mmol/L      Calcium 9.9 mg/dL      Total Protein 7.3 g/dL      Albumin 4.8 g/dL      ALT (SGPT) 12 U/L      AST (SGOT) 15 U/L      Alkaline Phosphatase 93 U/L      Total Bilirubin 0.3 mg/dL      Globulin 2.5 gm/dL      A/G Ratio 1.9 g/dL      BUN/Creatinine Ratio 12.8     Anion Gap 9.6 mmol/L      eGFR 88.7 mL/min/1.73     Narrative:      GFR Normal >60  Chronic Kidney Disease <60  Kidney Failure <15      Single High Sensitivity Troponin T [[id number]]  (Normal) Collected: 08/07/23 1206    Specimen: Blood Updated: 08/07/23 1230     HS Troponin T 6 ng/L     Narrative:      High Sensitive Troponin T Reference Range:  <10.0 ng/L- Negative Female for AMI  <15.0 ng/L- Negative Male for AMI  >=10 - Abnormal Female indicating possible myocardial injury.  >=15 - Abnormal Male indicating possible myocardial injury.   Clinicians would have to utilize clinical acumen, EKG, Troponin, and serial changes to determine if it is an Acute Myocardial Infarction or myocardial injury due to an underlying chronic condition.         CBC Auto Differential [[id number]]  (Abnormal) Collected: 08/07/23 1206    Specimen: Blood Updated: 08/07/23 1212     WBC 3.47 10*3/mm3      RBC 4.32 10*6/mm3      Hemoglobin 12.8 g/dL      Hematocrit 38.3 %      MCV 88.7 fL      MCH 29.6 pg      MCHC 33.4 g/dL      RDW 13.3 %      RDW-SD 43.4 fl      MPV 9.8 fL      Platelets 200 10*3/mm3      Neutrophil % 46.6 %      Lymphocyte % 44.1 %      Monocyte % 7.5 %      Eosinophil % 1.2 %      Basophil % 0.3 %      Immature Grans % 0.3 %      Neutrophils, Absolute 1.62 10*3/mm3      Lymphocytes, Absolute 1.53  10*3/mm3      Monocytes, Absolute 0.26 10*3/mm3      Eosinophils, Absolute 0.04 10*3/mm3      Basophils, Absolute 0.01 10*3/mm3      Immature Grans, Absolute 0.01 10*3/mm3      nRBC 0.0 /100 WBC              Imaging:    XR Chest 2 View    Result Date: 8/7/2023  PROCEDURE: XR CHEST 2 VW  COMPARISON: [facility], CR, XR CHEST 2 VW, 1/13/2022, 14:07.  INDICATIONS: CHEST PAIN/DISCOMFORT TODAY  FINDINGS:  The lungs are clear bilaterally.  The cardiac and mediastinal silhouettes appear normal.  No effusion is identified.        1. No acute cardiopulmonary disease.     Isidro Colon M.D.       Electronically Signed and Approved By: Isidro Colon M.D. on 8/07/2023 at 12:34                Differential Diagnosis and Discussion:    Palpitations: Differential diagnosis includes but is not limited to anxiety, atrioventricular blocks, mitral valve disease, hypoxia, coronary artery disease, hypokalemia, anemia, fever, COPD, congestive heart failure, pericarditis, Valeriy-Parkinson-White syndrome, pulmonary embolism, SVT, atrial fibrillation, atrial flutter, sinus tachycardia, thyrotoxicosis, and pheochromocytoma.    All labs were reviewed and interpreted by me.  All X-rays impressions were independently interpreted by me.  EKG was interpreted by supervising attending.    MDM     The patient's CBC that was reviewed and interpreted by me shows no abnormalities of critical concern. Of note, there is no anemia requiring a blood transfusion and the platelet count is acceptable.  The patient's CMP that was reviewed and interpretted by me shows no abnormalities of critical concern. Of note, the patient's sodium and potassium are acceptable. The patient's liver enzymes are unremarkable. The patient's renal function (creatinine) is preserved. The patient has a normal anion gap.  Troponin within normal limits.  Patient is not experiencing chest pain, shortness of breath, fever, nausea, or vomiting at this time.  Patient states she just needs  her blood pressure regimen altered.  I instructed the patient to follow-up with her primary care provider tomorrow she states she will.  I instructed the patient to return to the ER if she develops chest pain, shortness of air, fever, nausea vomiting, or headache.  Patient states she understands and agrees with plan of care.        Patient Care Considerations:    None      Consultants/Shared Management Plan:    None    Social Determinants of Health:    Patient is independent, reliable, and has access to care.       Disposition and Care Coordination:    Discharged: I considered escalation of care by admitting this patient to the hospital, however the patient has improved and is suitable and stable for discharge.    I have explained the patient's condition, diagnoses and treatment plan based on the information available to me at this time. I have answered questions and addressed any concerns. The patient has a good  understanding of the patient's diagnosis, condition, and treatment plan as can be expected at this point. The vital signs have been stable. The patient's condition is stable and appropriate for discharge from the emergency department.      The patient will pursue further outpatient evaluation with the primary care physician or other designated or consulting physician as outlined in the discharge instructions. They are agreeable to this plan of care and follow-up instructions have been explained in detail. The patient has received these instructions in written format and have expressed an understanding of the discharge instructions. The patient is aware that any significant change in condition or worsening of symptoms should prompt an immediate return to this or the closest emergency department or call to 911.  I have explained discharge medications and the need for follow up with the patient/caretakers. This was also printed in the discharge instructions. Patient was discharged with the following  medications and follow up:      Medication List      No changes were made to your prescriptions during this visit.      Provider, No Known  [facility]  [address] [id number]    Schedule an appointment as soon as possible for a visit in 1 day  For blood pressure management       Final diagnoses:   Hypertension, unspecified type        ED Disposition       ED Disposition   Discharge    Condition   Stable    Comment   --               This medical record created using voice recognition software.             Renny Mullen PA-C  08/07/23 [id number]

## 2023-10-03 ENCOUNTER — APPOINTMENT (OUTPATIENT)
Dept: GENERAL RADIOLOGY | Facility: HOSPITAL | Age: 58
End: 2023-10-03

## 2023-10-03 ENCOUNTER — HOSPITAL ENCOUNTER (EMERGENCY)
Facility: HOSPITAL | Age: 58
Discharge: HOME | End: 2023-10-03
Attending: EMERGENCY MEDICINE | Admitting: EMERGENCY MEDICINE

## 2023-10-03 VITALS
WEIGHT: 211.64 LBS | BODY MASS INDEX: 37.5 KG/M2 | DIASTOLIC BLOOD PRESSURE: 97 MMHG | HEART RATE: 77 BPM | HEIGHT: 63 IN | SYSTOLIC BLOOD PRESSURE: 192 MMHG | OXYGEN SATURATION: 97 % | RESPIRATION RATE: 20 BRPM | TEMPERATURE: 98.2 F

## 2023-10-03 DIAGNOSIS — M54.42 ACUTE LEFT-SIDED LOW BACK PAIN WITH LEFT-SIDED SCIATICA: Primary | ICD-10-CM
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
## 2023-10-03 DIAGNOSIS — M25.552 LEFT HIP PAIN: ICD-10-CM

## 2023-10-03 PROCEDURE — 99282 EMERGENCY DEPT VISIT SF MDM: CPT

## 2023-10-03 PROCEDURE — 96372 THER/PROPH/DIAG INJ SC/IM: CPT

## 2023-10-03 PROCEDURE — 25010000002 KETOROLAC TROMETHAMINE PER 15 MG

## 2023-10-03 PROCEDURE — 25010000002 DEXAMETHASONE SODIUM PHOSPHATE 10 MG/ML SOLUTION

## 2023-10-03 PROCEDURE — 73562 X-RAY EXAM OF KNEE 3: CPT

## 2023-10-03 PROCEDURE — 73502 X-RAY EXAM HIP UNI 2-3 VIEWS: CPT

## 2023-10-03 RX ORDER — KETOROLAC TROMETHAMINE 30 MG/ML
30 INJECTION, SOLUTION INTRAMUSCULAR; INTRAVENOUS ONCE
Status: COMPLETED | OUTPATIENT
Start: 2023-10-03 | End: 2023-10-03

## 2023-10-03 RX ORDER — METHYLPREDNISOLONE 4 MG/1
TABLET ORAL
Qty: 21 TABLET | Refills: 0 | Status: SHIPPED | OUTPATIENT
Start: 2023-10-03

## 2023-10-03 RX ORDER — DEXAMETHASONE SODIUM PHOSPHATE 10 MG/ML
10 INJECTION, SOLUTION INTRAMUSCULAR; INTRAVENOUS ONCE
Status: COMPLETED | OUTPATIENT
Start: 2023-10-03 | End: 2023-10-03

## 2023-10-03 RX ORDER — ESOMEPRAZOLE MAGNESIUM 40 MG/1
CAPSULE, DELAYED RELEASE ORAL
COMMUNITY
Start: 2023-09-27

## 2023-10-03 RX ORDER — LIDOCAINE 50 MG/G
1 PATCH TOPICAL EVERY 24 HOURS
Qty: 3 PATCH | Refills: 0 | Status: SHIPPED | OUTPATIENT
Start: 2023-10-03

## 2023-10-03 RX ORDER — METOPROLOL SUCCINATE 50 MG/1
1 TABLET, EXTENDED RELEASE ORAL EVERY 12 HOURS SCHEDULED
COMMUNITY
Start: 2023-09-08

## 2023-10-03 RX ADMIN — KETOROLAC TROMETHAMINE 30 MG: 30 INJECTION, SOLUTION INTRAMUSCULAR; INTRAVENOUS at 13:25

## 2023-10-03 RX ADMIN — DEXAMETHASONE SODIUM PHOSPHATE 10 MG: 10 INJECTION INTRAMUSCULAR; INTRAVENOUS at 13:25

## 2023-10-03 NOTE — Clinical Note
[facility] EMERGENCY ROOM  [address]  [place] [address]  Phone: [phone]    Kacie Lynch was seen and treated in our emergency department on 10/3/2023.  She may return to work on 10/06/2023.         Thank you for choosing [facility].    Shameka Herrera APRN

## 2023-10-03 NOTE — DISCHARGE INSTRUCTIONS
Your x-rays completed today were negative for any fracture, dislocation, or other injury that might require immediate intervention.  Please take the medication prescribed today as directed.  It is important that you check your blood glucose daily due to the fact that you have been given a steroid injection and prescribed steroids as we discussed during your ER visit.  Also at any time if you develop any difficulty ambulating, develop numbness and tingling to your groin area, or have any involuntary loss of bowel or bladder please return to the ER.  Otherwise follow-up with your primary care provider in 5 to 7 days if you continue to have pain.

## 2023-10-03 NOTE — ED PROVIDER NOTES
Time: 1:16 PM EDT  Date of encounter:  10/3/2023  Independent Historian/Clinical History and Information was obtained by:   Patient    History is limited by: N/A    Chief Complaint: back pain and left hip pain       History of Present Illness:  Patient is a 57 y.o. year old female who presents to the emergency department for evaluation of lower back pain that radiates down her left leg and left hip pain that she sustained after falling on Saturday.  Patient states she was sitting in a chair when she turned around quickly to speak to someone and slid out of the chair landing on the hard floor.  She denies striking her head and denies taking any type of blood thinning agents.  Her pain is rated as a 10 on a scale of 0-10    HPI    Patient Care Team  Primary Care Provider: Provider, No Known    Past Medical History:     Allergies   Allergen Reactions    Amlodipine Cough    Amoxicillin Rash    Cariprazine Unknown - High Severity and Rash    Ciprofloxacin Rash    Clonidine Derivatives Cough    Lamotrigine GI Intolerance and Cough    Latex Rash    Lisinopril Cough    Losartan Cough    Oxycodone-Acetaminophen Itching     Past Medical History:   Diagnosis Date    Abdominal hernia 01/05/2021    Allergies     Bipolar disorder     Chest pain     Depression 02/25/2020    2009    Diverticulitis     Hair loss 01/05/2021    Heart murmur     CHILD    Hypertension 06/02/2014    Leukocytopenia 10/09/2020    Urinary incontinence 06/26/2014    Vitamin D deficiency 06/04/2014     Past Surgical History:   Procedure Laterality Date    ABDOMINAL HERNIA REPAIR      BLADDER SURGERY      TENSION FREE VAGINAL TAPING    CHOLECYSTECTOMY  09/20/2012    COLONOSCOPY  06/26/2018    HYSTERECTOMY      PARTIAL    TUBAL ABDOMINAL LIGATION  2007     Family History   Problem Relation Age of Onset    Breast cancer Mother 72    Breast cancer Other 60    Stroke Other     Lung cancer Other 68    Stroke Other        Home Medications:  Prior to Admission  medications    Medication Sig Start Date End Date Taking? Authorizing Provider   esomeprazole (nexIUM) 40 MG capsule  9/27/23  Yes Juan Carlos Donato MD   metoprolol succinate XL (TOPROL-XL) 50 MG 24 hr tablet Take 1 tablet by mouth Every 12 (Twelve) Hours. 9/8/23  Yes Juan Carlos Donato MD   albuterol sulfate  (90 Base) MCG/ACT inhaler Inhale 2 puffs Every 4 (Four) Hours As Needed for Wheezing or Shortness of Air. 1/17/22   Pato Navarrete DO   busPIRone (BUSPAR) 7.5 MG tablet Take 1 tablet by mouth 3 (Three) Times a Day. 10/26/21   Kaylan Cochran APRN   cetirizine (zyrTEC) 10 MG tablet Take 1 tablet by mouth Daily. 10/26/21   Kaylan Cochran APRN   dicyclomine (BENTYL) 10 MG capsule Take 1 capsule by mouth 4 (Four) Times a Day As Needed (stomach pains). 2/16/22   Kaylan Cochran APRN   diphenoxylate-atropine (LOMOTIL) 2.5-0.025 MG per tablet Take 1 tablet by mouth 4 (Four) Times a Day As Needed for Diarrhea. 4/26/22   Twan Cummings MD   ergocalciferol (ERGOCALCIFEROL) 1.25 MG (31372 UT) capsule Take 1 capsule by mouth 1 (One) Time Per Week. 1/17/22   Pato Navarrete DO   gabapentin (NEURONTIN) 300 MG capsule Take 1 capsule by mouth Daily.    Juan Carlos Donato MD   hydrALAZINE (APRESOLINE) 50 MG tablet Take 1 tablet by mouth twice daily 6/3/22   Weston Jim APRN   hydroCHLOROthiazide (HYDRODIURIL) 25 MG tablet Take 1 tablet by mouth Daily. 10/26/21   Kaylan Cochran APRN   ibuprofen (ADVIL,MOTRIN) 800 MG tablet Take 1 tablet by mouth 3 (Three) Times a Day. 2/16/22   Kaylan Cochran APRN   Lurasidone HCl (Latuda) 20 MG tablet tablet Take 1 tablet by mouth Daily. 10/26/21   Kaylan Cochran APRN   ondansetron ODT (ZOFRAN-ODT) 4 MG disintegrating tablet Place 1 tablet on the tongue Every 6 (Six) Hours As Needed for Nausea or Vomiting. 4/26/22   Twan Cummings MD   pantoprazole (Protonix) 40 MG EC tablet Take 1 tablet by mouth Daily. 3/21/22   Dimas  "CHAPO Heredia   potassium chloride 10 MEQ CR tablet Take 1 tablet by mouth 2 (Two) Times a Day. 4/26/22   Twan Cummings MD   tiZANidine (ZANAFLEX) 4 MG tablet Take 1 tablet by mouth At Night As Needed for Muscle Spasms. 1/28/22   Kaylan Cochran APRN   traZODone (DESYREL) 300 MG tablet Take 1 tablet by mouth every night at bedtime. 1/28/22   Kaylan Cochran APRN        Social History:   Social History     Tobacco Use    Smoking status: Never    Smokeless tobacco: Never   Vaping Use    Vaping Use: Never used   Substance Use Topics    Alcohol use: Not Currently    Drug use: Not Currently         Review of Systems:  Review of Systems   Constitutional:  Negative for chills and fever.   HENT:  Negative for congestion, ear pain and sore throat.    Eyes:  Negative for pain.   Respiratory:  Negative for cough, chest tightness and shortness of breath.    Cardiovascular:  Negative for chest pain.   Gastrointestinal:  Negative for abdominal pain, diarrhea, nausea and vomiting.   Genitourinary:  Negative for flank pain and hematuria.   Musculoskeletal:  Positive for arthralgias (Left hip) and back pain. Negative for joint swelling.   Skin:  Negative for pallor.   Neurological:  Negative for seizures and headaches.   All other systems reviewed and are negative.     Physical Exam:  BP (!) 192/97 (BP Location: Right arm, Patient Position: Sitting)   Pulse 77   Temp 98.2 øF (36.8 øC) (Oral)   Resp 20   Ht 160 cm (63\")   Wt 96 kg (211 lb 10.3 oz)   LMP  (LMP Unknown)   SpO2 97%   BMI 37.49 kg/mý     Physical Exam  Vitals and nursing note reviewed.   Constitutional:       General: She is not in acute distress.     Appearance: Normal appearance. She is not toxic-appearing.   HENT:      Head: Normocephalic and atraumatic.      Mouth/Throat:      Mouth: Mucous membranes are moist.   Eyes:      General: No scleral icterus.  Cardiovascular:      Rate and Rhythm: Normal rate and regular rhythm.      Pulses: Normal " pulses.      Heart sounds: Normal heart sounds.   Pulmonary:      Effort: Pulmonary effort is normal. No respiratory distress.      Breath sounds: Normal breath sounds.   Abdominal:      General: Abdomen is flat.      Palpations: Abdomen is soft.      Tenderness: There is no abdominal tenderness.   Musculoskeletal:         General: Tenderness (Left hip) present. No swelling, deformity or signs of injury. Normal range of motion.      Cervical back: Normal range of motion and neck supple.   Skin:     General: Skin is warm and dry.      Coloration: Skin is not jaundiced or pale.      Findings: No bruising, erythema, lesion or rash.   Neurological:      Mental Status: She is alert and oriented to person, place, and time. Mental status is at baseline.      Sensory: No sensory deficit.                Procedures:  Procedures      Medical Decision Making:      Comorbidities that affect care:    Diabetes, Obesity    External Notes reviewed:          The following orders were placed and all results were independently analyzed by me:  Orders Placed This Encounter   Procedures    XR Hip With or Without Pelvis 2 - 3 View Left    XR Knee 3 View Left       Medications Given in the Emergency Department:  Medications   dexAMETHasone sodium phosphate injection 10 mg (10 mg Intramuscular Given 10/3/23 1325)   ketorolac (TORADOL) injection 30 mg (30 mg Intramuscular Given 10/3/23 1325)        ED Course:    ED Course as of 10/03/23 1556   Tue Oct 03, 2023   8967 Patient advises that she has taken steroids in the past and they have not greatly affected her glucose.  She was given strict precautions regarding glucose and the administration of steroids here in the department.  Patient verbalizes the understanding and the importance of being compliant with checking her blood glucose daily. [MS]      ED Course User Index  [MS] Shameka Herrera APRN       Labs:    Lab Results (last 24 hours)       ** No results found for the last 24  hours. **             Imaging:    XR Knee 3 View Left    Result Date: 10/3/2023  PROCEDURE: XR KNEE 3 VW LEFT  COMPARISON: None  INDICATIONS: GENERALIZED LEFT KNEE PAIN AFTER FALL 4 DAYS AGO  FINDINGS:  No fracture or malalignment is seen.  No joint effusion or loose body is evident.  No significant arthritic change is seen.        1. No acute fracture or malalignment      Isidro Colon M.D.       Electronically Signed and Approved By: Isidro Colon M.D. on 10/03/2023 at 14:17             XR Hip With or Without Pelvis 2 - 3 View Left    Result Date: 10/3/2023  PROCEDURE: XR HIP W OR WO PELVIS 2-3 VIEW LEFT  COMPARISON: None  INDICATIONS: GENERALIZED LEFT HIP PAIN AFTER FALL 4 DAYS AGO  FINDINGS:  No fracture or malalignment is identified.  Mild bilateral hip osteoarthritis is noted.        1. No acute fracture      Isidro Colon M.D.       Electronically Signed and Approved By: Isidro Colon M.D. on 10/03/2023 at 14:09                Differential Diagnosis and Discussion:    Back Pain: The patient presents with back pain. My differential diagnosis includes but is not limited to acute spinal epidural abscess, acute spinal epidural bleed, cauda equina syndrome, abdominal aortic aneurysm, aortic dissection, kidney stone, pyelonephritis, musculoskeletal back pain, spinal fracture, and osteoarthritis.     All X-rays impressions were independently interpreted by me.    MDM  Number of Diagnoses or Management Options  Acute left-sided low back pain with left-sided sciatica: new and does not require workup  Fall, initial encounter: new and does not require workup  Left hip pain: new and does not require workup     Amount and/or Complexity of Data Reviewed  Tests in the radiology section of [facility]: reviewed and ordered  Review and summarize past medical records: yes (I have personally reviewed patient's previous medical encounters.  )    Risk of Complications, Morbidity, and/or Mortality  Presenting problems:  moderate  Diagnostic procedures: moderate  Management options: moderate    Patient Progress  Patient progress: stable           Patient Care Considerations:    NARCOTICS: I considered prescribing opiate pain medication as an outpatient, however patient had an improvement in pain with NSAIDs while in the department.        Consultants/Shared Management Plan:    None    Social Determinants of Health:    Patient is independent, reliable, and has access to care.       Disposition and Care Coordination:    Discharged: The patient is suitable and stable for discharge with no need for consideration of observation or admission.    I have explained the patient's condition, diagnoses and treatment plan based on the information available to me at this time. I have answered questions and addressed any concerns. The patient has a good  understanding of the patient's diagnosis, condition, and treatment plan as can be expected at this point. The vital signs have been stable. The patient's condition is stable and appropriate for discharge from the emergency department.      The patient will pursue further outpatient evaluation with the primary care physician or other designated or consulting physician as outlined in the discharge instructions. They are agreeable to this plan of care and follow-up instructions have been explained in detail. The patient has received these instructions in written format and have expressed an understanding of the discharge instructions. The patient is aware that any significant change in condition or worsening of symptoms should prompt an immediate return to this or the closest emergency department or call to 911.    Final diagnoses:   Acute left-sided low back pain with left-sided sciatica   Left hip pain   Fall, initial encounter        ED Disposition       ED Disposition   Discharge    Condition   Stable    Comment   --               This medical record created using voice recognition  software.             Shameka Herrera, APRN  10/03/23 1559

## 2023-11-13 RX ORDER — DICYCLOMINE HYDROCHLORIDE 10 MG/1
CAPSULE ORAL
Qty: 60 CAPSULE | Refills: 0 | OUTPATIENT
Start: 2023-11-13

## 2024-07-03 ENCOUNTER — OFFICE VISIT (OUTPATIENT)
Dept: FAMILY MEDICINE CLINIC | Facility: CLINIC | Age: 59
End: 2024-07-03
Payer: COMMERCIAL

## 2024-07-03 VITALS
HEIGHT: 63 IN | TEMPERATURE: 97 F | DIASTOLIC BLOOD PRESSURE: 80 MMHG | BODY MASS INDEX: 39.69 KG/M2 | OXYGEN SATURATION: 97 % | WEIGHT: 224 LBS | SYSTOLIC BLOOD PRESSURE: 120 MMHG

## 2024-07-03 DIAGNOSIS — N32.81 OAB (OVERACTIVE BLADDER): ICD-10-CM

## 2024-07-03 DIAGNOSIS — L65.9 ALOPECIA: ICD-10-CM

## 2024-07-03 DIAGNOSIS — E66.09 CLASS 2 OBESITY DUE TO EXCESS CALORIES WITH BODY MASS INDEX (BMI) OF 39.0 TO 39.9 IN ADULT, UNSPECIFIED WHETHER SERIOUS COMORBIDITY PRESENT: ICD-10-CM

## 2024-07-03 DIAGNOSIS — M54.50 CHRONIC LOW BACK PAIN, UNSPECIFIED BACK PAIN LATERALITY, UNSPECIFIED WHETHER SCIATICA PRESENT: ICD-10-CM

## 2024-07-03 DIAGNOSIS — T78.40XD ALLERGIC REACTION TO DRUG, SUBSEQUENT ENCOUNTER: ICD-10-CM

## 2024-07-03 DIAGNOSIS — M51.36 DDD (DEGENERATIVE DISC DISEASE), LUMBAR: ICD-10-CM

## 2024-07-03 DIAGNOSIS — R32 URINARY INCONTINENCE, UNSPECIFIED TYPE: ICD-10-CM

## 2024-07-03 DIAGNOSIS — Z00.00 ENCOUNTER FOR MEDICAL EXAMINATION TO ESTABLISH CARE: Primary | ICD-10-CM

## 2024-07-03 DIAGNOSIS — G89.29 CHRONIC LOW BACK PAIN, UNSPECIFIED BACK PAIN LATERALITY, UNSPECIFIED WHETHER SCIATICA PRESENT: ICD-10-CM

## 2024-07-03 RX ORDER — DOXYCYCLINE 100 MG/1
1 CAPSULE ORAL DAILY
COMMUNITY
End: 2024-07-03

## 2024-07-03 RX ORDER — HYDROCODONE BITARTRATE AND ACETAMINOPHEN 5; 325 MG/1; MG/1
TABLET ORAL
COMMUNITY
End: 2024-07-03

## 2024-07-03 RX ORDER — METAXALONE 400 MG/1
TABLET ORAL
COMMUNITY
End: 2024-07-03

## 2024-07-03 RX ORDER — ONDANSETRON 4 MG/1
TABLET, FILM COATED ORAL
COMMUNITY
End: 2024-07-03

## 2024-07-03 RX ORDER — SPIRONOLACTONE 50 MG/1
50 TABLET, FILM COATED ORAL DAILY
COMMUNITY

## 2024-07-03 RX ORDER — MINOXIDIL 2 %
SOLUTION, NON-ORAL TOPICAL 2 TIMES DAILY
Qty: 60 ML | Refills: 12 | Status: SHIPPED | OUTPATIENT
Start: 2024-07-03 | End: 2024-10-01

## 2024-07-03 RX ORDER — ALPRAZOLAM 0.5 MG/1
1 TABLET ORAL 3 TIMES DAILY
COMMUNITY
Start: 2024-03-11

## 2024-07-03 RX ORDER — MIRABEGRON 25 MG/1
25 TABLET, FILM COATED, EXTENDED RELEASE ORAL DAILY
Qty: 30 TABLET | Refills: 10 | Status: SHIPPED | OUTPATIENT
Start: 2024-07-03 | End: 2024-08-02

## 2024-07-03 NOTE — PROGRESS NOTES
"Chief Complaint  Establish Care    Subjective      History of Present Illness    Kacie Lynch is a 58 y.o. female who presents to [facility]   With PMH of DDD, urinary incontinence, blindness in the right eye, she is having surgery August 7th for her eye.  Presents today to establish care with me.  She complains of OAB, and h/o urinary incontinence.  Pt states she had a lumbar MRI at the [facility] but has not been able to find the results for it.   Patient also requires refills for her minoxidil today.  Due to her history of allergies requesting to get tested for further allergies at the allergist will send in a referral today.    Objective   Vital Signs:   Vitals:    07/03/24 0756   BP: 120/80   Temp: 97 °F (36.1 °C)   SpO2: 97%   Weight: 102 kg (224 lb)   Height: 160 cm (63\")     Body mass index is 39.68 kg/m².    Wt Readings from Last 3 Encounters:   07/03/24 102 kg (224 lb)   10/03/23 96 kg (211 lb 10.3 oz)   08/07/23 99.1 kg (218 lb 7.6 oz)     BP Readings from Last 3 Encounters:   07/03/24 120/80   10/03/23 (!) 192/97   08/07/23 172/94       Health Maintenance   Topic Date Due    ANNUAL PHYSICAL  Never done    ZOSTER VACCINE (1 of 2) 07/03/2024 (Originally 11/16/2015)    COVID-19 Vaccine (3 - 2023-24 season) 09/22/2024 (Originally 9/1/2023)    TDAP/TD VACCINES (1 - Tdap) 07/03/2025 (Originally 11/16/1984)    INFLUENZA VACCINE  08/01/2024    MAMMOGRAM  02/07/2025    BMI FOLLOWUP  07/03/2025    COLORECTAL CANCER SCREENING  06/26/2028    HEPATITIS C SCREENING  Completed    Pneumococcal Vaccine 0-64  Aged Out       Physical Exam   General:  AAO x3, no acute distress.  Eyes:  EOMI, PERRLA  Ears/Nose/Mouth/Throat:  Moist mucous membranes  Respiratory:  CTA bilaterally, no wheezes rales or rhonchi  Cardiovascular:  RRR no murmur rubs or gallops  Gastrointestinal:  Abdomen soft nondistended nontender bowel sounds present x4 quadrants  Musculoskeletal:  Moves all 4 " extremities spontaneously, no apparent weakness  Skin:  Warm, dry, no skin rashes or lesions  Neurologic:  AAO x3, cranial nerves 2-12 grossly intact, no focal neuro deficits  Psychiatric:  Normal mood and affect    Result Review :     The following data was reviewed by: Luz Blakely MD on 07/03/2024:      Procedures          Diagnoses and all orders for this visit:    1. Encounter for medical examination to establish care (Primary)    2. DDD (degenerative disc disease), lumbar  -     Ambulatory Referral to Physical Therapy    3. Chronic low back pain, unspecified back pain laterality, unspecified whether sciatica present  -     Ambulatory Referral to Pain Management  -     XR Spine Lumbar 4+ View; Future  -     Ambulatory Referral to Physical Therapy    4. Class 2 obesity due to excess calories with body mass index (BMI) of 39.0 to 39.9 in adult, unspecified whether serious comorbidity present    5. Urinary incontinence, unspecified type  -     Ambulatory Referral to Urology    6. OAB (overactive bladder)  -     Mirabegron ER (Myrbetriq) 25 MG tablet sustained-release 24 hour 24 hr tablet; Take 1 tablet by mouth Daily for 30 days.  Dispense: 30 tablet; Refill: 10  -     Ambulatory Referral to Urology    7. Allergic reaction to drug, subsequent encounter  -     Ambulatory Referral to Allergy    8. Alopecia  -     minoxidil (Rogaine) 2 % external solution; Apply  topically to the appropriate area as directed 2 (Two) Times a Day for 90 days.  Dispense: 60 mL; Refill: 12         Class 2 Severe Obesity (BMI >=35 and <=39.9). Obesity-related health conditions include the following: none. Obesity is newly identified. BMI is is above average; BMI management plan is completed. We discussed low calorie, low carb based diet program, portion control, increasing exercise, and joining a fitness center or start home based exercise program.         FOLLOW UP  Return in about 4 weeks (around 7/31/2024).  Patient was given  instructions and counseling regarding her condition or for health maintenance advice. Please see specific information pulled into the AVS if appropriate.       Luz Blakely MD  07/03/24  09:27 EDT    CURRENT & DISCONTINUED MEDICATIONS  Current Outpatient Medications   Medication Instructions    albuterol sulfate  (90 Base) MCG/ACT inhaler 2 puffs, Inhalation, Every 4 Hours PRN    ALPRAZolam (XANAX) 0.5 MG tablet 1 tablet, Oral, 3 times daily    busPIRone (BUSPAR) 7.5 mg, Oral, 3 Times Daily    cetirizine (ZYRTEC) 10 mg, Oral, Daily    dicyclomine (BENTYL) 10 mg, Oral, 4 Times Daily PRN    esomeprazole (nexIUM) 40 MG capsule     minoxidil (Rogaine) 2 % external solution Topical, 2 Times Daily    Mirabegron ER (MYRBETRIQ) 25 mg, Oral, Daily    spironolactone (ALDACTONE) 50 mg, Oral, Daily    tiZANidine (ZANAFLEX) 4 mg, Oral, Nightly PRN    traZODone (DESYREL) 300 mg, Oral, Every Night at Bedtime       Medications Discontinued During This Encounter   Medication Reason    gabapentin (NEURONTIN) 300 MG capsule     HYDROcodone-acetaminophen (NORCO) 5-325 MG per tablet     diclofenac (VOLTAREN) 50 MG EC tablet     ibuprofen (ADVIL,MOTRIN) 800 MG tablet     diphenoxylate-atropine (LOMOTIL) 2.5-0.025 MG per tablet     ondansetron ODT (ZOFRAN-ODT) 4 MG disintegrating tablet     ondansetron (ZOFRAN) 4 MG tablet     hydrALAZINE (APRESOLINE) 50 MG tablet     Lurasidone HCl (Latuda) 20 MG tablet tablet     metoprolol succinate XL (TOPROL-XL) 50 MG 24 hr tablet     methylPREDNISolone (MEDROL) 4 MG dose pack     lidocaine (LIDODERM) 5 %     hydroCHLOROthiazide (HYDRODIURIL) 25 MG tablet     potassium chloride 10 MEQ CR tablet     metaxalone (SKELAXIN) 400 MG tablet     doxycycline (MONODOX) 100 MG capsule     ergocalciferol (ERGOCALCIFEROL) 1.25 MG (31092 UT) capsule     pantoprazole (Protonix) 40 MG EC tablet

## 2024-07-09 ENCOUNTER — TELEPHONE (OUTPATIENT)
Dept: FAMILY MEDICINE CLINIC | Facility: CLINIC | Age: 59
End: 2024-07-09
Payer: COMMERCIAL

## 2024-07-12 ENCOUNTER — PATIENT ROUNDING (BHMG ONLY) (OUTPATIENT)
Dept: FAMILY MEDICINE CLINIC | Facility: CLINIC | Age: 59
End: 2024-07-12
Payer: COMMERCIAL

## 2024-07-12 NOTE — PROGRESS NOTES
July 12, 2024    Hello, may I speak with Kacie Lynch?    My name is Messi Gonsalez     I am  with [facility]  [address]  [address]  [place] [id number]  [phone].    Before we get started may I verify your date of birth? 1965    I am calling to officially welcome you to our practice and ask about your recent visit. Is this a good time to talk? yes    Tell me about your visit with us. What things went well?  I will not know until I have my second visit to assess how I like this provider        We're always looking for ways to make our patients' experiences even better. Do you have recommendations on ways we may improve?  no    Overall were you satisfied with your first visit to our practice? yes       I appreciate you taking the time to speak with me today. Is there anything else I can do for you? no      Thank you, and have a great day.

## 2024-08-08 ENCOUNTER — APPOINTMENT (OUTPATIENT)
Dept: CT IMAGING | Facility: HOSPITAL | Age: 59
End: 2024-08-08
Payer: COMMERCIAL

## 2024-08-08 ENCOUNTER — HOSPITAL ENCOUNTER (EMERGENCY)
Facility: HOSPITAL | Age: 59
Discharge: HOME | End: 2024-08-08
Attending: EMERGENCY MEDICINE
Payer: COMMERCIAL

## 2024-08-08 VITALS
HEART RATE: 85 BPM | RESPIRATION RATE: 17 BRPM | TEMPERATURE: 98.7 F | WEIGHT: 217.15 LBS | BODY MASS INDEX: 38.48 KG/M2 | SYSTOLIC BLOOD PRESSURE: 168 MMHG | DIASTOLIC BLOOD PRESSURE: 79 MMHG | HEIGHT: 63 IN | OXYGEN SATURATION: 98 %

## 2024-08-08 DIAGNOSIS — K57.92 DIVERTICULITIS: Primary | ICD-10-CM

## 2024-08-08 LAB
ALBUMIN SERPL-MCNC: 4.2 G/DL (ref 3.5–5.2)
ALBUMIN/GLOB SERPL: 1.4 G/DL
ALP SERPL-CCNC: 104 U/L (ref 39–117)
ALT SERPL W P-5'-P-CCNC: 13 U/L (ref 1–33)
ANION GAP SERPL CALCULATED.3IONS-SCNC: 12.4 MMOL/L (ref 5–15)
AST SERPL-CCNC: 15 U/L (ref 1–32)
BASOPHILS # BLD AUTO: 0.01 10*3/MM3 (ref 0–0.2)
BASOPHILS NFR BLD AUTO: 0.1 % (ref 0–1.5)
BILIRUB SERPL-MCNC: 0.5 MG/DL (ref 0–1.2)
BILIRUB UR QL STRIP: NEGATIVE
BUN SERPL-MCNC: 7 MG/DL (ref 6–20)
BUN/CREAT SERPL: 9 (ref 7–25)
CALCIUM SPEC-SCNC: 9.1 MG/DL (ref 8.6–10.5)
CHLORIDE SERPL-SCNC: 96 MMOL/L (ref 98–107)
CLARITY UR: CLEAR
CO2 SERPL-SCNC: 27.6 MMOL/L (ref 22–29)
COLOR UR: YELLOW
CREAT SERPL-MCNC: 0.78 MG/DL (ref 0.57–1)
D-LACTATE SERPL-SCNC: 1.1 MMOL/L (ref 0.5–2)
DEPRECATED RDW RBC AUTO: 38.2 FL (ref 37–54)
EGFRCR SERPLBLD CKD-EPI 2021: 88.2 ML/MIN/1.73
EOSINOPHIL # BLD AUTO: 0.03 10*3/MM3 (ref 0–0.4)
EOSINOPHIL NFR BLD AUTO: 0.3 % (ref 0.3–6.2)
ERYTHROCYTE [DISTWIDTH] IN BLOOD BY AUTOMATED COUNT: 12.6 % (ref 12.3–15.4)
FLUAV SUBTYP SPEC NAA+PROBE: NOT DETECTED
FLUBV RNA ISLT QL NAA+PROBE: NOT DETECTED
GLOBULIN UR ELPH-MCNC: 3.1 GM/DL
GLUCOSE SERPL-MCNC: 154 MG/DL (ref 65–99)
GLUCOSE UR STRIP-MCNC: NEGATIVE MG/DL
HCT VFR BLD AUTO: 38 % (ref 34–46.6)
HGB BLD-MCNC: 12.8 G/DL (ref 12–15.9)
HGB UR QL STRIP.AUTO: NEGATIVE
HOLD SPECIMEN: NORMAL
HOLD SPECIMEN: NORMAL
IMM GRANULOCYTES # BLD AUTO: 0.04 10*3/MM3 (ref 0–0.05)
IMM GRANULOCYTES NFR BLD AUTO: 0.4 % (ref 0–0.5)
KETONES UR QL STRIP: NEGATIVE
LEUKOCYTE ESTERASE UR QL STRIP.AUTO: NEGATIVE
LIPASE SERPL-CCNC: 18 U/L (ref 13–60)
LYMPHOCYTES # BLD AUTO: 1.24 10*3/MM3 (ref 0.7–3.1)
LYMPHOCYTES NFR BLD AUTO: 12.4 % (ref 19.6–45.3)
MCH RBC QN AUTO: 28.1 PG (ref 26.6–33)
MCHC RBC AUTO-ENTMCNC: 33.7 G/DL (ref 31.5–35.7)
MCV RBC AUTO: 83.3 FL (ref 79–97)
MONOCYTES # BLD AUTO: 0.62 10*3/MM3 (ref 0.1–0.9)
MONOCYTES NFR BLD AUTO: 6.2 % (ref 5–12)
NEUTROPHILS NFR BLD AUTO: 8.04 10*3/MM3 (ref 1.7–7)
NEUTROPHILS NFR BLD AUTO: 80.6 % (ref 42.7–76)
NITRITE UR QL STRIP: NEGATIVE
NRBC BLD AUTO-RTO: 0 /100 WBC (ref 0–0.2)
PH UR STRIP.AUTO: 7 [PH] (ref 5–8)
PLATELET # BLD AUTO: 197 10*3/MM3 (ref 140–450)
PMV BLD AUTO: 10.1 FL (ref 6–12)
POTASSIUM SERPL-SCNC: 3.6 MMOL/L (ref 3.5–5.2)
PROT SERPL-MCNC: 7.3 G/DL (ref 6–8.5)
PROT UR QL STRIP: NEGATIVE
RBC # BLD AUTO: 4.56 10*6/MM3 (ref 3.77–5.28)
RSV RNA NPH QL NAA+NON-PROBE: NOT DETECTED
SARS-COV-2 RNA RESP QL NAA+PROBE: NOT DETECTED
SODIUM SERPL-SCNC: 136 MMOL/L (ref 136–145)
SP GR UR STRIP: 1.01 (ref 1–1.03)
UROBILINOGEN UR QL STRIP: NORMAL
WBC NRBC COR # BLD AUTO: 9.98 10*3/MM3 (ref 3.4–10.8)
WHOLE BLOOD HOLD COAG: NORMAL
WHOLE BLOOD HOLD SPECIMEN: NORMAL

## 2024-08-08 PROCEDURE — 25010000002 MORPHINE PER 10 MG: Performed by: EMERGENCY MEDICINE

## 2024-08-08 PROCEDURE — 96361 HYDRATE IV INFUSION ADD-ON: CPT

## 2024-08-08 PROCEDURE — 99285 EMERGENCY DEPT VISIT HI MDM: CPT

## 2024-08-08 PROCEDURE — 83690 ASSAY OF LIPASE: CPT | Performed by: EMERGENCY MEDICINE

## 2024-08-08 PROCEDURE — 83605 ASSAY OF LACTIC ACID: CPT | Performed by: EMERGENCY MEDICINE

## 2024-08-08 PROCEDURE — 25810000003 SODIUM CHLORIDE 0.9 % SOLUTION: Performed by: EMERGENCY MEDICINE

## 2024-08-08 PROCEDURE — 81003 URINALYSIS AUTO W/O SCOPE: CPT | Performed by: EMERGENCY MEDICINE

## 2024-08-08 PROCEDURE — 87637 SARSCOV2&INF A&B&RSV AMP PRB: CPT | Performed by: EMERGENCY MEDICINE

## 2024-08-08 PROCEDURE — 25010000002 ONDANSETRON PER 1 MG: Performed by: EMERGENCY MEDICINE

## 2024-08-08 PROCEDURE — 96374 THER/PROPH/DIAG INJ IV PUSH: CPT

## 2024-08-08 PROCEDURE — 80053 COMPREHEN METABOLIC PANEL: CPT | Performed by: EMERGENCY MEDICINE

## 2024-08-08 PROCEDURE — 74177 CT ABD & PELVIS W/CONTRAST: CPT

## 2024-08-08 PROCEDURE — 25510000001 IOPAMIDOL PER 1 ML: Performed by: EMERGENCY MEDICINE

## 2024-08-08 PROCEDURE — 85025 COMPLETE CBC W/AUTO DIFF WBC: CPT | Performed by: EMERGENCY MEDICINE

## 2024-08-08 PROCEDURE — 96375 TX/PRO/DX INJ NEW DRUG ADDON: CPT

## 2024-08-08 RX ORDER — METRONIDAZOLE 500 MG/1
500 TABLET ORAL 3 TIMES DAILY
Qty: 30 TABLET | Refills: 0 | Status: SHIPPED | OUTPATIENT
Start: 2024-08-08 | End: 2024-08-18

## 2024-08-08 RX ORDER — ONDANSETRON 2 MG/ML
4 INJECTION INTRAMUSCULAR; INTRAVENOUS ONCE
Status: COMPLETED | OUTPATIENT
Start: 2024-08-08 | End: 2024-08-08

## 2024-08-08 RX ORDER — SODIUM CHLORIDE 0.9 % (FLUSH) 0.9 %
10 SYRINGE (ML) INJECTION AS NEEDED
Status: DISCONTINUED | OUTPATIENT
Start: 2024-08-08 | End: 2024-08-08 | Stop reason: HOSPADM

## 2024-08-08 RX ORDER — CIPROFLOXACIN 500 MG/1
500 TABLET, FILM COATED ORAL 2 TIMES DAILY
Qty: 20 TABLET | Refills: 0 | Status: SHIPPED | OUTPATIENT
Start: 2024-08-08 | End: 2024-08-18

## 2024-08-08 RX ADMIN — IOPAMIDOL 95 ML: 755 INJECTION, SOLUTION INTRAVENOUS at 10:07

## 2024-08-08 RX ADMIN — ONDANSETRON 4 MG: 2 INJECTION INTRAMUSCULAR; INTRAVENOUS at 09:26

## 2024-08-08 RX ADMIN — SODIUM CHLORIDE 1000 ML: 9 INJECTION, SOLUTION INTRAVENOUS at 09:25

## 2024-08-08 RX ADMIN — MORPHINE SULFATE 4 MG: 4 INJECTION, SOLUTION INTRAMUSCULAR; INTRAVENOUS at 09:28

## 2024-08-08 NOTE — ED PROVIDER NOTES
Time: 9:20 AM EDT  Date of encounter:  8/8/2024  Independent Historian/Clinical History and Information was obtained by:   Patient    History is limited by: N/A    Chief Complaint: Abdominal pain, fever      History of Present Illness:  Patient is a 58 y.o. year old female who presents to the emergency department for evaluation of abdominal pain and fever.  Patient states that she has a history of diverticulitis and thinks that she may have another flare.  States that she has been constipated for about 2 weeks now but over the last few days she started having abdominal pain.  States the pain is mainly on the left side.  Did have some nausea and vomiting today.  Did have a bowel movement yesterday.  Does report that she has had a fever to 101 as well.  States that she cannot get comfortable because she has significant pain on the left side.  Does have a history of hypertension.      Patient Care Team  Primary Care Provider: Luz Blakely MD    Past Medical History:     Allergies   Allergen Reactions    Penicillins Anaphylaxis    Amlodipine Cough    Amoxicillin Rash    Cariprazine Unknown - High Severity and Rash    Ciprofloxacin Rash    Clonidine Derivatives Cough    Lamotrigine GI Intolerance and Cough    Latex Rash    Lisinopril Cough    Losartan Cough    Oxycodone-Acetaminophen Itching     Past Medical History:   Diagnosis Date    Abdominal hernia 01/05/2021    ADHD (attention deficit hyperactivity disorder) 04/1975    Allergic 05/1979    Allergies     Anxiety 10/2001    Arthritis 07/2010    Bipolar disorder     Cataract 02/2024    Chest pain     Depression 02/25/2020    2009    Diverticulitis     Diverticulosis 11/2018    GERD (gastroesophageal reflux disease) 05/2008    Hair loss 01/05/2021    Headache O5/2000    Heart murmur     CHILD    Hypertension 06/02/2014    Irritable bowel syndrome 07/2007    Leukocytopenia 10/09/2020    Low back pain     Obesity 05/1999    Urinary incontinence 06/26/2014    Vitamin D  deficiency 06/04/2014     Past Surgical History:   Procedure Laterality Date    ABDOMINAL HERNIA REPAIR      BLADDER SURGERY      TENSION FREE VAGINAL TAPING    CHOLECYSTECTOMY  09/20/2012    COLONOSCOPY  06/26/2018    HYSTERECTOMY      PARTIAL    SUBTOTAL HYSTERECTOMY      TUBAL ABDOMINAL LIGATION  2007     Family History   Problem Relation Age of Onset    Breast cancer Mother 72    Cancer Mother     Depression Mother     Breast cancer Other 60    Stroke Other     Lung cancer Other 68    Stroke Other     Depression Sister     Depression Sister     Drug abuse Sister     Stroke Sister     Depression Sister     Mental illness Sister        Home Medications:  Prior to Admission medications    Medication Sig Start Date End Date Taking? Authorizing Provider   albuterol sulfate  (90 Base) MCG/ACT inhaler Inhale 2 puffs Every 4 (Four) Hours As Needed for Wheezing or Shortness of Air. 1/17/22   Pato Navarrete DO   ALPRAZolam (XANAX) 0.5 MG tablet Take 1 tablet by mouth 3 times a day. 3/11/24   Juan Carlos Donato MD   busPIRone (BUSPAR) 7.5 MG tablet Take 1 tablet by mouth 3 (Three) Times a Day. 10/26/21   Kaylan Cochran APRN   cetirizine (zyrTEC) 10 MG tablet Take 1 tablet by mouth Daily. 10/26/21   Kaylan Cochran APRN   dicyclomine (BENTYL) 10 MG capsule Take 1 capsule by mouth 4 (Four) Times a Day As Needed (stomach pains). 2/16/22   Kaylan Cochran APRN   esomeprazole (nexIUM) 40 MG capsule  9/27/23   ProviderJuan Carlos MD   minoxidil (Rogaine) 2 % external solution Apply  topically to the appropriate area as directed 2 (Two) Times a Day for 90 days. 7/3/24 10/1/24  Luz Blakely MD   spironolactone (ALDACTONE) 50 MG tablet Take 1 tablet by mouth Daily.    ProviderJuan Carlos MD   tiZANidine (ZANAFLEX) 4 MG tablet Take 1 tablet by mouth At Night As Needed for Muscle Spasms. 1/28/22   Kaylan Cochran APRN   traZODone (DESYREL) 300 MG tablet Take 1 tablet by mouth every night at  "bedtime. 1/28/22   Kaylna Cochran APRN        Social History:   Social History     Tobacco Use    Smoking status: Never    Smokeless tobacco: Never   Vaping Use    Vaping status: Never Used   Substance Use Topics    Alcohol use: Not Currently    Drug use: Not Currently     Types: Marijuana         Review of Systems:  Review of Systems   Constitutional:  Positive for fever.   Gastrointestinal:  Positive for abdominal pain, nausea and vomiting.        Physical Exam:  /90 (BP Location: Left arm, Patient Position: Sitting)   Pulse 83   Temp 98.7 °F (37.1 °C) (Oral)   Resp 16   Ht 160 cm (63\")   Wt 98.5 kg (217 lb 2.5 oz)   LMP  (LMP Unknown)   SpO2 96%   BMI 38.47 kg/m²     Physical Exam  Vitals and nursing note reviewed.   Constitutional:       Appearance: Normal appearance. She is obese.   HENT:      Head: Normocephalic and atraumatic.   Eyes:      General: No scleral icterus.  Cardiovascular:      Rate and Rhythm: Normal rate and regular rhythm.      Heart sounds: Normal heart sounds.   Pulmonary:      Effort: Pulmonary effort is normal.      Breath sounds: Normal breath sounds.   Abdominal:      Palpations: Abdomen is soft.      Tenderness: There is abdominal tenderness.      Comments: Left-sided abdominal tenderness   Musculoskeletal:         General: Normal range of motion.      Cervical back: Normal range of motion.   Skin:     Findings: No rash.   Neurological:      General: No focal deficit present.      Mental Status: She is alert.                  Procedures:  Procedures      Medical Decision Making:      Comorbidities that affect care:    Diverticulitis, Hypertension    External Notes reviewed:    Reviewed ED note from 10/3/2023      The following orders were placed and all results were independently analyzed by me:  Orders Placed This Encounter   Procedures    COVID-19, FLU A/B, RSV PCR 1 HR TAT - Swab, Nasopharynx    CT Abdomen Pelvis With Contrast    [place] Draw    Comprehensive " Metabolic Panel    Lipase    Urinalysis With Microscopic If Indicated (No Culture) - Urine, Clean Catch    Lactic Acid, Plasma    CBC Auto Differential    NPO Diet NPO Type: Strict NPO    Undress & Gown    Insert Peripheral IV    CBC & Differential    Green Top (Gel)    Lavender Top    Gold Top - SST    Light Blue Top       Medications Given in the Emergency Department:  Medications   sodium chloride 0.9 % flush 10 mL (has no administration in time range)   sodium chloride 0.9 % bolus 1,000 mL (0 mL Intravenous Stopped 8/8/24 1025)   ondansetron (ZOFRAN) injection 4 mg (4 mg Intravenous Given 8/8/24 0926)   morphine injection 4 mg (4 mg Intravenous Given 8/8/24 0928)   iopamidol (ISOVUE-370) 76 % injection 100 mL (95 mL Intravenous Given 8/8/24 1007)        ED Course:    ED Course as of 08/08/24 1110   Thu Aug 08, 2024   1109 Spoke with the patient about her Cipro allergy and she states that she had a small rash on her hands.  This does not appear to be a true allergy.  Will place on Cipro and Flagyl. [MA]      ED Course User Index  [MA] Brian Bray MD       Labs:    Lab Results (last 24 hours)       Procedure Component Value Units Date/Time    CBC & Differential [[id number]]  (Abnormal) Collected: 08/08/24 0903    Specimen: Blood Updated: 08/08/24 0920    Narrative:      The following orders were created for panel order CBC & Differential.  Procedure                               Abnormality         Status                     ---------                               -----------         ------                     CBC Auto Differential[[id number]]        Abnormal            Final result                 Please view results for these tests on the individual orders.    Comprehensive Metabolic Panel [[id number]]  (Abnormal) Collected: 08/08/24 0903    Specimen: Blood Updated: 08/08/24 0940     Glucose 154 mg/dL      BUN 7 mg/dL      Creatinine 0.78 mg/dL      Sodium 136 mmol/L      Potassium 3.6 mmol/L      Chloride  96 mmol/L      CO2 27.6 mmol/L      Calcium 9.1 mg/dL      Total Protein 7.3 g/dL      Albumin 4.2 g/dL      ALT (SGPT) 13 U/L      AST (SGOT) 15 U/L      Alkaline Phosphatase 104 U/L      Total Bilirubin 0.5 mg/dL      Globulin 3.1 gm/dL      A/G Ratio 1.4 g/dL      BUN/Creatinine Ratio 9.0     Anion Gap 12.4 mmol/L      eGFR 88.2 mL/min/1.73     Narrative:      GFR Normal >60  Chronic Kidney Disease <60  Kidney Failure <15      Lipase [[id number]]  (Normal) Collected: 08/08/24 0903    Specimen: Blood Updated: 08/08/24 0940     Lipase 18 U/L     Lactic Acid, Plasma [[id number]]  (Normal) Collected: 08/08/24 0903    Specimen: Blood Updated: 08/08/24 0936     Lactate 1.1 mmol/L     COVID-19, FLU A/B, RSV PCR 1 HR TAT - Swab, Nasopharynx [[id number]]  (Normal) Collected: 08/08/24 0903    Specimen: Swab from Nasopharynx Updated: 08/08/24 1052     COVID19 Not Detected     Influenza A PCR Not Detected     Influenza B PCR Not Detected     RSV, PCR Not Detected    Narrative:      Fact sheet for providers: https://www.fda.gov/media/606605/download    Fact sheet for patients: https://www.fda.gov/media/363580/download    Test performed by PCR.    CBC Auto Differential [[id number]]  (Abnormal) Collected: 08/08/24 0903    Specimen: Blood Updated: 08/08/24 0920     WBC 9.98 10*3/mm3      RBC 4.56 10*6/mm3      Hemoglobin 12.8 g/dL      Hematocrit 38.0 %      MCV 83.3 fL      MCH 28.1 pg      MCHC 33.7 g/dL      RDW 12.6 %      RDW-SD 38.2 fl      MPV 10.1 fL      Platelets 197 10*3/mm3      Neutrophil % 80.6 %      Lymphocyte % 12.4 %      Monocyte % 6.2 %      Eosinophil % 0.3 %      Basophil % 0.1 %      Immature Grans % 0.4 %      Neutrophils, Absolute 8.04 10*3/mm3      Lymphocytes, Absolute 1.24 10*3/mm3      Monocytes, Absolute 0.62 10*3/mm3      Eosinophils, Absolute 0.03 10*3/mm3      Basophils, Absolute 0.01 10*3/mm3      Immature Grans, Absolute 0.04 10*3/mm3      nRBC 0.0 /100 WBC     Urinalysis With Microscopic If  Indicated (No Culture) - Urine, Clean Catch [[id number]]  (Normal) Collected: 08/08/24 1028    Specimen: Urine, Clean Catch Updated: 08/08/24 1033     Color, UA Yellow     Appearance, UA Clear     pH, UA 7.0     Specific Gravity, UA 1.014     Glucose, UA Negative     Ketones, UA Negative     Bilirubin, UA Negative     Blood, UA Negative     Protein, UA Negative     Leuk Esterase, UA Negative     Nitrite, UA Negative     Urobilinogen, UA 0.2 E.U./dL    Narrative:      Urine microscopic not indicated.             Imaging:    CT Abdomen Pelvis With Contrast    Result Date: 8/8/2024  CT ABDOMEN PELVIS W CONTRAST Date of Exam: 8/8/2024 10:04 AM EDT Indication: abdominal pain. Comparison: CT abdomen and pelvis 7/22/2021 Technique: Axial CT images were obtained of the abdomen and pelvis after the uneventful intravenous administration of iodinated contrast. Reconstructed coronal and sagittal images were also obtained. Automated exposure control and iterative construction methods were used. Findings: LUNG BASES: Some linear left basilar atelectasis versus scarring is noted. LIVER:  Unremarkable parenchyma without focal lesion. BILIARY/GALLBLADDER: Surgically absent. SPLEEN:  Unremarkable PANCREAS:  Unremarkable ADRENAL:  Unremarkable KIDNEYS:  Unremarkable parenchyma with no solid mass identified. No obstruction.  No calculus identified. GASTROINTESTINAL/MESENTERY: Evaluation of the gastrointestinal tract demonstrates bowel wall thickening involving the descending colon in the posterior left lower quadrant. There is inflammatory fat stranding and fluid tracking along the paracolic gutter  with findings compatible with acute diverticulitis. No evidence for perforation or abscess. Remaining colonic loops are fluid-filled without evidence for distention. Trace fluid tracks into the left lower pelvis. MESENTERIC VESSELS:  Patent. AORTA/IVC:  Normal caliber. RETROPERITONEUM/LYMPH NODES: There are subcentimeter retroperitoneal  lymph nodes. REPRODUCTIVE: Surgically absent. BLADDER:  Unremarkable OSSEUS STRUCTURES:  Typical for age with no acute process identified.     Impression: 1. Findings compatible with acute uncomplicated diverticulitis in the left lower quadrant. Electronically Signed: Carissa Pearson MD  8/8/2024 10:18 AM EDT  Workstation ID: [id number]       Differential Diagnosis and Discussion:    Abdominal Pain: Based on the patient's signs and symptoms, I considered abdominal aortic aneurysm, small bowel obstruction, pancreatitis, acute cholecystitis, acute appendecitis, peptic ulcer disease, gastritis, colitis, endocrine disorders, irritable bowel syndrome and other differential diagnosis an etiology of the patient's abdominal pain.    All labs were reviewed and interpreted by me.  CT scan radiology impression was interpreted by me.    MDM     Amount and/or Complexity of Data Reviewed  Clinical lab tests: reviewed  Tests in the radiology section of CPT®: reviewed       Patient is a 58-year-old female who presents with complaints abdominal pain and fever.  States has been on the left side.  Has had a history of diverticulitis in the past.  Has acute uncomplicated diverticulitis here.  Will place following antibiotics.  Patient does report that she is not sure that she had a true reaction to the Cipro and states it was only a rash on her hand.  Will place on Cipro and Flagyl at this time.  She is allergic to penicillins.  Should she have new or worsening symptoms return to emergency room.  Patient is otherwise improved at this time.  Will DC.          Patient Care Considerations:          Consultants/Shared Management Plan:    None    Social Determinants of Health:    Patient is independent, reliable, and has access to care.       Disposition and Care Coordination:    Discharged: The patient is suitable and stable for discharge with no need for consideration of admission.    I have explained the patient´s condition, diagnoses and  treatment plan based on the information available to me at this time. I have answered questions and addressed any concerns. The patient has a good  understanding of the patient´s diagnosis, condition, and treatment plan as can be expected at this point. The vital signs have been stable. The patient´s condition is stable and appropriate for discharge from the emergency department.      The patient will pursue further outpatient evaluation with the primary care physician or other designated or consulting physician as outlined in the discharge instructions. They are agreeable to this plan of care and follow-up instructions have been explained in detail. The patient has received these instructions in written format and have expressed an understanding of the discharge instructions. The patient is aware that any significant change in condition or worsening of symptoms should prompt an immediate return to this or the closest emergency department or call to 911.      Final diagnoses:   Diverticulitis        ED Disposition       ED Disposition   Discharge    Condition   Stable    Comment   --               This medical record created using voice recognition software.             Brian Bray MD  08/08/24 1111

## 2024-08-12 ENCOUNTER — APPOINTMENT (OUTPATIENT)
Dept: CT IMAGING | Facility: HOSPITAL | Age: 59
End: 2024-08-12
Payer: COMMERCIAL

## 2024-08-12 ENCOUNTER — HOSPITAL ENCOUNTER (EMERGENCY)
Facility: HOSPITAL | Age: 59
Discharge: HOME | End: 2024-08-12
Attending: EMERGENCY MEDICINE
Payer: COMMERCIAL

## 2024-08-12 VITALS
OXYGEN SATURATION: 96 % | TEMPERATURE: 98.2 F | BODY MASS INDEX: 38.59 KG/M2 | HEART RATE: 73 BPM | WEIGHT: 217.81 LBS | DIASTOLIC BLOOD PRESSURE: 94 MMHG | HEIGHT: 63 IN | RESPIRATION RATE: 17 BRPM | SYSTOLIC BLOOD PRESSURE: 174 MMHG

## 2024-08-12 DIAGNOSIS — R10.32 ABDOMINAL PAIN, ACUTE, LEFT LOWER QUADRANT: Primary | ICD-10-CM

## 2024-08-12 DIAGNOSIS — K57.92 ACUTE DIVERTICULITIS: ICD-10-CM

## 2024-08-12 LAB
ALBUMIN SERPL-MCNC: 3.7 G/DL (ref 3.5–5.2)
ALBUMIN/GLOB SERPL: 1.2 G/DL
ALP SERPL-CCNC: 86 U/L (ref 39–117)
ALT SERPL W P-5'-P-CCNC: 26 U/L (ref 1–33)
ANION GAP SERPL CALCULATED.3IONS-SCNC: 11.3 MMOL/L (ref 5–15)
AST SERPL-CCNC: 47 U/L (ref 1–32)
BACTERIA UR QL AUTO: ABNORMAL /HPF
BASOPHILS # BLD AUTO: 0.02 10*3/MM3 (ref 0–0.2)
BASOPHILS NFR BLD AUTO: 0.4 % (ref 0–1.5)
BILIRUB SERPL-MCNC: 0.2 MG/DL (ref 0–1.2)
BILIRUB UR QL STRIP: NEGATIVE
BUN SERPL-MCNC: 7 MG/DL (ref 6–20)
BUN/CREAT SERPL: 8.4 (ref 7–25)
CALCIUM SPEC-SCNC: 9.1 MG/DL (ref 8.6–10.5)
CHLORIDE SERPL-SCNC: 101 MMOL/L (ref 98–107)
CLARITY UR: CLEAR
CO2 SERPL-SCNC: 26.7 MMOL/L (ref 22–29)
COLOR UR: YELLOW
CREAT SERPL-MCNC: 0.83 MG/DL (ref 0.57–1)
D-LACTATE SERPL-SCNC: 2 MMOL/L (ref 0.5–2)
DEPRECATED RDW RBC AUTO: 37.7 FL (ref 37–54)
EGFRCR SERPLBLD CKD-EPI 2021: 81.8 ML/MIN/1.73
EOSINOPHIL # BLD AUTO: 0.07 10*3/MM3 (ref 0–0.4)
EOSINOPHIL NFR BLD AUTO: 1.3 % (ref 0.3–6.2)
ERYTHROCYTE [DISTWIDTH] IN BLOOD BY AUTOMATED COUNT: 12.5 % (ref 12.3–15.4)
GLOBULIN UR ELPH-MCNC: 3 GM/DL
GLUCOSE SERPL-MCNC: 197 MG/DL (ref 65–99)
GLUCOSE UR STRIP-MCNC: NEGATIVE MG/DL
HCT VFR BLD AUTO: 38.2 % (ref 34–46.6)
HGB BLD-MCNC: 12.7 G/DL (ref 12–15.9)
HGB UR QL STRIP.AUTO: NEGATIVE
HOLD SPECIMEN: NORMAL
HOLD SPECIMEN: NORMAL
HYALINE CASTS UR QL AUTO: ABNORMAL /LPF
IMM GRANULOCYTES # BLD AUTO: 0.04 10*3/MM3 (ref 0–0.05)
IMM GRANULOCYTES NFR BLD AUTO: 0.7 % (ref 0–0.5)
KETONES UR QL STRIP: NEGATIVE
LEUKOCYTE ESTERASE UR QL STRIP.AUTO: ABNORMAL
LIPASE SERPL-CCNC: 23 U/L (ref 13–60)
LYMPHOCYTES # BLD AUTO: 1.36 10*3/MM3 (ref 0.7–3.1)
LYMPHOCYTES NFR BLD AUTO: 25.2 % (ref 19.6–45.3)
MCH RBC QN AUTO: 28 PG (ref 26.6–33)
MCHC RBC AUTO-ENTMCNC: 33.2 G/DL (ref 31.5–35.7)
MCV RBC AUTO: 84.1 FL (ref 79–97)
MONOCYTES # BLD AUTO: 0.29 10*3/MM3 (ref 0.1–0.9)
MONOCYTES NFR BLD AUTO: 5.4 % (ref 5–12)
NEUTROPHILS NFR BLD AUTO: 3.61 10*3/MM3 (ref 1.7–7)
NEUTROPHILS NFR BLD AUTO: 67 % (ref 42.7–76)
NITRITE UR QL STRIP: NEGATIVE
NRBC BLD AUTO-RTO: 0 /100 WBC (ref 0–0.2)
PH UR STRIP.AUTO: 7 [PH] (ref 5–8)
PLATELET # BLD AUTO: 208 10*3/MM3 (ref 140–450)
PMV BLD AUTO: 9.2 FL (ref 6–12)
POTASSIUM SERPL-SCNC: 3.6 MMOL/L (ref 3.5–5.2)
PROT SERPL-MCNC: 6.7 G/DL (ref 6–8.5)
PROT UR QL STRIP: NEGATIVE
RBC # BLD AUTO: 4.54 10*6/MM3 (ref 3.77–5.28)
RBC # UR STRIP: ABNORMAL /HPF
REF LAB TEST METHOD: ABNORMAL
SODIUM SERPL-SCNC: 139 MMOL/L (ref 136–145)
SP GR UR STRIP: 1.01 (ref 1–1.03)
SQUAMOUS #/AREA URNS HPF: ABNORMAL /HPF
UROBILINOGEN UR QL STRIP: ABNORMAL
WBC # UR STRIP: ABNORMAL /HPF
WBC NRBC COR # BLD AUTO: 5.39 10*3/MM3 (ref 3.4–10.8)
WHOLE BLOOD HOLD COAG: NORMAL
WHOLE BLOOD HOLD SPECIMEN: NORMAL

## 2024-08-12 PROCEDURE — 96375 TX/PRO/DX INJ NEW DRUG ADDON: CPT

## 2024-08-12 PROCEDURE — 83690 ASSAY OF LIPASE: CPT

## 2024-08-12 PROCEDURE — 25510000001 IOPAMIDOL PER 1 ML: Performed by: EMERGENCY MEDICINE

## 2024-08-12 PROCEDURE — 80053 COMPREHEN METABOLIC PANEL: CPT

## 2024-08-12 PROCEDURE — 83605 ASSAY OF LACTIC ACID: CPT

## 2024-08-12 PROCEDURE — 25010000002 HYDROMORPHONE 1 MG/ML SOLUTION: Performed by: EMERGENCY MEDICINE

## 2024-08-12 PROCEDURE — 25810000003 SODIUM CHLORIDE 0.9 % SOLUTION: Performed by: EMERGENCY MEDICINE

## 2024-08-12 PROCEDURE — 99285 EMERGENCY DEPT VISIT HI MDM: CPT

## 2024-08-12 PROCEDURE — 81001 URINALYSIS AUTO W/SCOPE: CPT | Performed by: EMERGENCY MEDICINE

## 2024-08-12 PROCEDURE — 25010000002 ONDANSETRON PER 1 MG: Performed by: EMERGENCY MEDICINE

## 2024-08-12 PROCEDURE — 85025 COMPLETE CBC W/AUTO DIFF WBC: CPT

## 2024-08-12 PROCEDURE — 96374 THER/PROPH/DIAG INJ IV PUSH: CPT

## 2024-08-12 PROCEDURE — 74177 CT ABD & PELVIS W/CONTRAST: CPT

## 2024-08-12 RX ORDER — HYDROCODONE BITARTRATE AND ACETAMINOPHEN 7.5; 325 MG/1; MG/1
1 TABLET ORAL EVERY 6 HOURS PRN
Qty: 12 TABLET | Refills: 0 | Status: SHIPPED | OUTPATIENT
Start: 2024-08-12

## 2024-08-12 RX ORDER — SODIUM CHLORIDE 0.9 % (FLUSH) 0.9 %
10 SYRINGE (ML) INJECTION AS NEEDED
Status: DISCONTINUED | OUTPATIENT
Start: 2024-08-12 | End: 2024-08-12 | Stop reason: HOSPADM

## 2024-08-12 RX ORDER — ONDANSETRON 2 MG/ML
4 INJECTION INTRAMUSCULAR; INTRAVENOUS ONCE
Status: COMPLETED | OUTPATIENT
Start: 2024-08-12 | End: 2024-08-12

## 2024-08-12 RX ADMIN — ONDANSETRON 4 MG: 2 INJECTION INTRAMUSCULAR; INTRAVENOUS at 15:13

## 2024-08-12 RX ADMIN — IOPAMIDOL 100 ML: 755 INJECTION, SOLUTION INTRAVENOUS at 16:09

## 2024-08-12 RX ADMIN — HYDROMORPHONE HYDROCHLORIDE 1 MG: 1 INJECTION, SOLUTION INTRAMUSCULAR; INTRAVENOUS; SUBCUTANEOUS at 15:16

## 2024-08-12 RX ADMIN — SODIUM CHLORIDE 1000 ML: 9 INJECTION, SOLUTION INTRAVENOUS at 15:22

## 2024-08-12 NOTE — ED PROVIDER NOTES
Time: 2:45 PM EDT  Date of encounter:  8/12/2024  Independent Historian/Clinical History and Information was obtained by:   Patient    History is limited by: N/A    Chief Complaint: Severe, worsening lower abdominal pain        History of Present Illness:  Patient is a 58 y.o. year old female with history of diverticulitis who presents to the emergency department for evaluation of worsening abdominal pain in the left lower abdomen but also radiating to the right side of the abdomen.    She has been on 4 days of Cipro Flagyl antibiotics as directed but not getting better like she typically does this time and actually pain is worse.    She has been having intermittent fevers at home.  Denies any diarrhea or dysuria.      Patient Care Team  Primary Care Provider: Provider, No Known    Past Medical History:     Allergies   Allergen Reactions    Penicillins Anaphylaxis    Amlodipine Cough    Amoxicillin Rash    Cariprazine Unknown - High Severity and Rash    Ciprofloxacin Rash    Clonidine Derivatives Cough    Lamotrigine GI Intolerance and Cough    Latex Rash    Lisinopril Cough    Losartan Cough    Oxycodone-Acetaminophen Itching     Past Medical History:   Diagnosis Date    Abdominal hernia 01/05/2021    ADHD (attention deficit hyperactivity disorder) 04/1975    Allergic 05/1979    Allergies     Anxiety 10/2001    Arthritis 07/2010    Bipolar disorder     Cataract 02/2024    Chest pain     Depression 02/25/2020    2009    Diverticulitis     Diverticulosis 11/2018    GERD (gastroesophageal reflux disease) 05/2008    Hair loss 01/05/2021    Headache O5/2000    Heart murmur     CHILD    Hypertension 06/02/2014    Irritable bowel syndrome 07/2007    Leukocytopenia 10/09/2020    Low back pain     Obesity 05/1999    Urinary incontinence 06/26/2014    Vitamin D deficiency 06/04/2014     Past Surgical History:   Procedure Laterality Date    ABDOMINAL HERNIA REPAIR      BLADDER SURGERY      TENSION FREE VAGINAL TAPING     CHOLECYSTECTOMY  09/20/2012    COLONOSCOPY  06/26/2018    HYSTERECTOMY      PARTIAL    SUBTOTAL HYSTERECTOMY      TUBAL ABDOMINAL LIGATION  2007     Family History   Problem Relation Age of Onset    Breast cancer Mother 72    Cancer Mother     Depression Mother     Breast cancer Other 60    Stroke Other     Lung cancer Other 68    Stroke Other     Depression Sister     Depression Sister     Drug abuse Sister     Stroke Sister     Depression Sister     Mental illness Sister        Home Medications:  Prior to Admission medications    Medication Sig Start Date End Date Taking? Authorizing Provider   busPIRone (BUSPAR) 7.5 MG tablet Take 1 tablet by mouth 3 (Three) Times a Day. 10/26/21  Yes Kaylan Cochran APRN   esomeprazole (nexIUM) 40 MG capsule  9/27/23  Yes Juan Carlos Donato MD   metroNIDAZOLE (FLAGYL) 500 MG tablet Take 1 tablet by mouth 3 (Three) Times a Day for 10 days. 8/8/24 8/18/24 Yes Brian Bray MD   traZODone (DESYREL) 300 MG tablet Take 1 tablet by mouth every night at bedtime. 1/28/22  Yes Kaylan Cochran APRN   albuterol sulfate  (90 Base) MCG/ACT inhaler Inhale 2 puffs Every 4 (Four) Hours As Needed for Wheezing or Shortness of Air. 1/17/22   Pato Navarrete,    ALPRAZolam (XANAX) 0.5 MG tablet Take 1 tablet by mouth 3 times a day. 3/11/24   Juan Carlos Donato MD   cetirizine (zyrTEC) 10 MG tablet Take 1 tablet by mouth Daily. 10/26/21   Kaylan Cochran APRN   ciprofloxacin (CIPRO) 500 MG tablet Take 1 tablet by mouth 2 (Two) Times a Day for 10 days. 8/8/24 8/18/24  Brian Bray MD   dicyclomine (BENTYL) 10 MG capsule Take 1 capsule by mouth 4 (Four) Times a Day As Needed (stomach pains). 2/16/22   Kaylan Cochran APRN   minoxidil (Rogaine) 2 % external solution Apply  topically to the appropriate area as directed 2 (Two) Times a Day for 90 days. 7/3/24 10/1/24  Luz Blakely MD   spironolactone (ALDACTONE) 50 MG tablet Take 1 tablet by mouth  "Daily.    Provider, Historical, MD        Social History:   Social History     Tobacco Use    Smoking status: Never    Smokeless tobacco: Never   Vaping Use    Vaping status: Never Used   Substance Use Topics    Alcohol use: Not Currently    Drug use: Not Currently     Types: Marijuana         Review of Systems:  Review of Systems   I performed a 10 point review of systems which was all negative, except for the positives found in the HPI above.      Physical Exam:  /94 (BP Location: Left arm, Patient Position: Lying)   Pulse 73   Temp 98.2 °F (36.8 °C) (Oral)   Resp 17   Ht 160 cm (63\")   Wt 98.8 kg (217 lb 13 oz)   LMP  (LMP Unknown)   SpO2 96%   BMI 38.58 kg/m²       Physical Exam   General: Awake alert and in moderate distress due to abdominal pain    HEENT: Head normocephalic atraumatic, eyes PERRLA EOMI, nose normal, oropharynx normal.    Neck: Supple full range of motion, no meningismus, no lymphadenopathy    Heart: Regular rate and rhythm, no murmurs or rubs, 2+ radial pulses bilaterally    Lungs: Clear to auscultation bilaterally without wheezes or crackles, no respiratory distress    Abdomen: Moderate to severe abdominal tenderness and some guarding and mild distention diffusely    Skin: Warm, dry, no rash    Musculoskeletal: Normal range of motion, no lower extremity edema    Neurologic: Oriented x3, no motor deficits no sensory deficits    Psychiatric: Mood appears stable, no psychosis          Procedures:  Procedures      Medical Decision Making:      Comorbidities that affect care:    Diverticulitis    External Notes reviewed:    Previous Radiological Studies: I reviewed her most recent CT scan of the abdomen pelvis 4 days ago showing acute diverticulitis      The following orders were placed and all results were independently analyzed by me:  Orders Placed This Encounter   Procedures    CT Abdomen Pelvis With Contrast    [place] Draw    Comprehensive Metabolic Panel    Lipase    " Urinalysis With Microscopic If Indicated (No Culture) - Urine, Clean Catch    Lactic Acid, Plasma    CBC Auto Differential    Urinalysis, Microscopic Only - Urine, Clean Catch    CBC & Differential    Green Top (Gel)    Lavender Top    Gold Top - SST    Light Blue Top       Medications Given in the Emergency Department:  Medications   sodium chloride 0.9 % bolus 1,000 mL (0 mL Intravenous Stopped 8/12/24 1552)   HYDROmorphone (DILAUDID) injection 1 mg (1 mg Intravenous Given 8/12/24 1516)   ondansetron (ZOFRAN) injection 4 mg (4 mg Intravenous Given 8/12/24 1513)   iopamidol (ISOVUE-370) 76 % injection 100 mL (100 mL Intravenous Given 8/12/24 1609)        ED Course:         Labs:    Lab Results (last 24 hours)       ** No results found for the last 24 hours. **             Imaging:    No Radiology Exams Resulted Within Past 24 Hours      Differential Diagnosis and Discussion:    Abdominal Pain: Based on the patient's signs and symptoms, I considered abdominal aortic aneurysm, small bowel obstruction, pancreatitis, acute cholecystitis, acute appendecitis, peptic ulcer disease, gastritis, colitis, endocrine disorders, irritable bowel syndrome and other differential diagnosis an etiology of the patient's abdominal pain.    All labs were reviewed and interpreted by me.  CT scan radiology impression was interpreted by me.    MDM     Amount and/or Complexity of Data Reviewed  Clinical lab tests: reviewed         This patient is a 58-year-old female recently treated on a course of ciprofloxacin and Flagyl 4 days ago for acute diverticulitis now presents to the ED with worsening abdominal pain.    Given her severe tenderness on exam I considered possibility of complicated diverticulitis including perforation or abscess and we are sending her for another CT abdomen and pelvis with IV contrast here.    I am also treating her pain with IV pain and nausea meds and hydrating with fluids and checking labs.    Fortunately, all of  her lab work including white blood cell count looks reassuring today.    I will sign her out to my colleague at change of shift, and we will plan for if her CT scan does not show any worsening or complication of the diverticulitis, and I will recommend clear liquid diet for the next 2 days and continue course of Cipro and Flagyl antibiotics and I will prescribe some pain meds for a few days until her symptoms improve.              Patient Care Considerations:          Consultants/Shared Management Plan:        Social Determinants of Health:    Patient is independent, reliable, and has access to care.       Disposition and Care Coordination:    Discharged: The patient is suitable and stable for discharge with no need for consideration of admission.    I have explained the patient´s condition, diagnoses and treatment plan based on the information available to me at this time. I have answered questions and addressed any concerns. The patient has a good  understanding of the patient´s diagnosis, condition, and treatment plan as can be expected at this point. The vital signs have been stable. The patient´s condition is stable and appropriate for discharge from the emergency department.      The patient will pursue further outpatient evaluation with the primary care physician or other designated or consulting physician as outlined in the discharge instructions. They are agreeable to this plan of care and follow-up instructions have been explained in detail. The patient has received these instructions in written format and has expressed an understanding of the discharge instructions. The patient is aware that any significant change in condition or worsening of symptoms should prompt an immediate return to this or the closest emergency department or call to 911.  I have explained discharge medications and the need for follow up with the patient/caretakers. This was also printed in the discharge instructions. Patient was  discharged with the following medications and follow up:      Medication List        New Prescriptions      HYDROcodone-acetaminophen 7.5-325 MG per tablet  Commonly known as: NORCO  Take 1 tablet by mouth Every 6 (Six) Hours As Needed for Severe Pain.               Where to Get Your Medications        These medications were sent to [address], [place] - [address] [facility], [place], [facility] - [phone]  - [phone]   [address] [facility] [place] [address]      Phone: [phone]   HYDROcodone-acetaminophen 7.5-325 MG per tablet      Provider, No Known  [facility]  [place] [address]    Call in 1 day  As needed, for a follow-up appointment       Final diagnoses:   Abdominal pain, acute, left lower quadrant   Acute diverticulitis        ED Disposition       ED Disposition   Discharge    Condition   Stable    Comment   --               This medical record created using voice recognition software.             Twan Cummings MD  08/13/24 [id number]

## 2024-08-12 NOTE — DISCHARGE INSTRUCTIONS
Make sure you eat a clear liquid diet for the first day or 2 for diverticulitis to let your intestines heal before eating food, and continue your Cipro and Flagyl antibiotics as directed and you can use the pain medicine for the first couple of days for now.    Call your doctor for a follow-up appointment.

## 2024-08-12 NOTE — Clinical Note
[facility] EMERGENCY ROOM  [address] [place] [address]  Phone: [phone]  Fax: [phone]    Kacie Lynch was seen and treated in our emergency department on 8/12/2024.  She may return to work on 08/15/2024.         Thank you for choosing [facility].    Twan Cummings MD

## 2024-08-26 ENCOUNTER — TELEPHONE (OUTPATIENT)
Dept: FAMILY MEDICINE CLINIC | Facility: CLINIC | Age: 59
End: 2024-08-26
Payer: COMMERCIAL

## 2024-09-13 ENCOUNTER — TRANSCRIBE ORDERS (OUTPATIENT)
Dept: ADMINISTRATIVE | Facility: HOSPITAL | Age: 59
End: 2024-09-13
Payer: COMMERCIAL

## 2024-09-13 DIAGNOSIS — Z78.0 POSTMENOPAUSAL: Primary | ICD-10-CM

## 2024-09-13 DIAGNOSIS — Z12.31 SCREENING MAMMOGRAM FOR BREAST CANCER: Primary | ICD-10-CM

## 2024-09-26 ENCOUNTER — TELEPHONE (OUTPATIENT)
Dept: GASTROENTEROLOGY | Facility: CLINIC | Age: 59
End: 2024-09-26
Payer: COMMERCIAL

## 2024-10-01 NOTE — PROGRESS NOTES
Chief Complaint: Urinary Frequency and Urinary Incontinence    Subjective         History of Present Illness  Kacie Lynch is a 58 y.o. female presents to [facility] UROLOGY to be seen for urinary frequency/incontinence.    Patient presents reporting she was given a dose of PCN last year, which is listed as an allergy, when this occurred she started having urinary incontinence. She reports since that time she has had more problems with incontinence.     Has never been treated for OAB.     Frequency-admits    Urgency-admits    Incontinence- admits with urgency and with cough/laugh/sneeze    Nocturia-3-4    GH-denies    History of stones-denies     surgeries-possible bladder repair with birth of her daughter    Family history of  malignancy-denies    Cardiopulmonary-denies    Anticoagulants-denies    Smoker-denies    Objective     Past Medical History:   Diagnosis Date    Abdominal hernia 01/05/2021    ADHD (attention deficit hyperactivity disorder) 04/1975    Allergic 05/1979    Allergies     Anxiety 10/2001    Arthritis 07/2010    Bipolar disorder     Cataract 02/2024    Chest pain     Depression 02/25/2020    2009    Diverticulitis     Diverticulosis 11/2018    GERD (gastroesophageal reflux disease) 05/2008    Hair loss 01/05/2021    Headache O5/2000    Heart murmur     CHILD    Hypertension 06/02/2014    Irritable bowel syndrome 07/2007    Leukocytopenia 10/09/2020    Low back pain     Obesity 05/1999    Urinary incontinence 06/26/2014    Vitamin D deficiency 06/04/2014       Past Surgical History:   Procedure Laterality Date    ABDOMINAL HERNIA REPAIR      BLADDER SURGERY      TENSION FREE VAGINAL TAPING    CHOLECYSTECTOMY  09/20/2012    COLONOSCOPY  06/26/2018    HYSTERECTOMY      PARTIAL    SUBTOTAL HYSTERECTOMY      TUBAL ABDOMINAL LIGATION  2007         Current Outpatient Medications:     busPIRone (BUSPAR) 7.5 MG tablet, Take 1 tablet by mouth 3 (Three) Times a Day., Disp: 270  tablet, Rfl: 1    cetirizine (zyrTEC) 10 MG tablet, Take 1 tablet by mouth Daily., Disp: 90 tablet, Rfl: 3    dicyclomine (BENTYL) 10 MG capsule, Take 1 capsule by mouth 4 (Four) Times a Day As Needed (stomach pains)., Disp: 60 capsule, Rfl: 2    esomeprazole (nexIUM) 40 MG capsule, , Disp: , Rfl:     linaclotide (Linzess) 145 MCG capsule capsule, Take 1 capsule by mouth Daily., Disp: , Rfl:     ondansetron ODT (ZOFRAN-ODT) 8 MG disintegrating tablet, Place 1 tablet on the tongue Every 8 (Eight) Hours As Needed., Disp: , Rfl:     tiZANidine (ZANAFLEX) 4 MG tablet, Take 1 tablet by mouth Every 8 (Eight) Hours., Disp: , Rfl:     traZODone (DESYREL) 300 MG tablet, Take 1 tablet by mouth every night at bedtime., Disp: 30 tablet, Rfl: 5    albuterol sulfate  (90 Base) MCG/ACT inhaler, Inhale 2 puffs Every 4 (Four) Hours As Needed for Wheezing or Shortness of Air. (Patient not taking: Reported on 10/2/2024), Disp: 18 g, Rfl: 0    ALPRAZolam (XANAX) 0.5 MG tablet, Take 1 tablet by mouth 3 times a day. (Patient not taking: Reported on 10/2/2024), Disp: , Rfl:     HYDROcodone-acetaminophen (NORCO) 7.5-325 MG per tablet, Take 1 tablet by mouth Every 6 (Six) Hours As Needed for Severe Pain., Disp: 12 tablet, Rfl: 0    oxybutynin XL (DITROPAN-XL) 5 MG 24 hr tablet, Take 1 tablet by mouth Daily., Disp: 30 tablet, Rfl: 2    spironolactone (ALDACTONE) 50 MG tablet, Take 1 tablet by mouth Daily. (Patient not taking: Reported on 10/2/2024), Disp: , Rfl:     Allergies   Allergen Reactions    Penicillins Anaphylaxis    Amlodipine Cough    Hydralazine Swelling    Labetalol Swelling    Metoprolol Swelling    Amoxicillin Rash    Cariprazine Unknown - High Severity and Rash    Chlorthalidone Rash    Ciprofloxacin Rash    Clonidine Derivatives Cough    Hydrochlorothiazide Rash    Lamotrigine GI Intolerance and Cough    Latex Rash    Lisinopril Cough    Losartan Cough    Oxycodone-Acetaminophen Itching        Family History   Problem  "Relation Age of Onset    Breast cancer Mother 72    Cancer Mother     Depression Mother     Breast cancer Other 60    Stroke Other     Lung cancer Other 68    Stroke Other     Depression Sister     Depression Sister     Drug abuse Sister     Stroke Sister     Depression Sister     Mental illness Sister        Social History     Socioeconomic History    Marital status: Single   Tobacco Use    Smoking status: Never     Passive exposure: Never    Smokeless tobacco: Never   Vaping Use    Vaping status: Never Used   Substance and Sexual Activity    Alcohol use: Not Currently    Drug use: Not Currently     Types: Marijuana    Sexual activity: Not Currently     Partners: Male     Birth control/protection: Condom       Vital Signs:   /98 (BP Location: Left arm, Patient Position: Sitting, Cuff Size: Large Adult)   Pulse 83   Ht 160 cm (63\")   Wt 97.5 kg (215 lb)   BMI 38.09 kg/m²      Physical Exam  Vitals reviewed.   Constitutional:       Appearance: Normal appearance.   Neurological:      General: No focal deficit present.      Mental Status: She is alert and oriented to person, place, and time.   Psychiatric:         Mood and Affect: Mood normal.         Behavior: Behavior normal.          Result Review :   The following data was reviewed by: CHAPO Ya on 10/02/2024:  Results for orders placed or performed in visit on 10/02/24   Bladder Scan   Result Value Ref Range    Urine Volume 0             Procedures        Assessment and Plan    Diagnoses and all orders for this visit:    1. Urinary incontinence, unspecified type (Primary)  -     Bladder Scan    2. Urinary frequency  -     Bladder Scan    3. Overactive bladder  -     oxybutynin XL (DITROPAN-XL) 5 MG 24 hr tablet; Take 1 tablet by mouth Daily.  Dispense: 30 tablet; Refill: 2    Overactive bladder-discussed with patient at length, all questions addressed. Discussed with patient that urinary urgency and frequency due to overactive bladder " is a common condition that is multifactorial in nature, frequently difficult to treat, unlikely to cure, and management is dictated by patient motivation to improve and cope with symptoms.     Initiate first-line conservative therapy; behavioral modifications including bladder training via timed and double voiding; fluid management, limiting fluids prior to sleep, and voiding immediately prior to sleep.   -Patient also interested in trial of medication for OAB.  Will initiate second line therapy via trial of anticholinergic, oxybutynin ER, prescribed.  Patient to take for 4-6 weeks to ensure adequate trial.  Side effects of this class discussed with patient including but not limited to dry mouth and constipation.    I will see her back in 6 weeks or sooner for new concerns.    Follow Up   Return in about 6 weeks (around 11/13/2024).  Patient was given instructions and counseling regarding her condition or for health maintenance advice. Please see specific information pulled into the AVS if appropriate.         This document has been electronically signed by CHAPO Ya  October 2, 2024 09:41 EDT

## 2024-10-02 ENCOUNTER — OFFICE VISIT (OUTPATIENT)
Dept: UROLOGY | Facility: CLINIC | Age: 59
End: 2024-10-02
Payer: COMMERCIAL

## 2024-10-02 VITALS
SYSTOLIC BLOOD PRESSURE: 162 MMHG | HEIGHT: 63 IN | DIASTOLIC BLOOD PRESSURE: 98 MMHG | HEART RATE: 83 BPM | BODY MASS INDEX: 38.09 KG/M2 | WEIGHT: 215 LBS

## 2024-10-02 DIAGNOSIS — N32.81 OVERACTIVE BLADDER: ICD-10-CM

## 2024-10-02 DIAGNOSIS — R32 URINARY INCONTINENCE, UNSPECIFIED TYPE: Primary | ICD-10-CM

## 2024-10-02 DIAGNOSIS — R35.0 URINARY FREQUENCY: ICD-10-CM

## 2024-10-02 LAB — URINE VOLUME: 0

## 2024-10-02 RX ORDER — OXYBUTYNIN CHLORIDE 5 MG/1
5 TABLET, EXTENDED RELEASE ORAL DAILY
Qty: 30 TABLET | Refills: 2 | Status: SHIPPED | OUTPATIENT
Start: 2024-10-02

## 2024-10-02 RX ORDER — ONDANSETRON 8 MG/1
8 TABLET, ORALLY DISINTEGRATING ORAL EVERY 8 HOURS PRN
COMMUNITY
Start: 2024-09-09

## 2024-10-16 ENCOUNTER — HOSPITAL ENCOUNTER (OUTPATIENT)
Dept: BONE DENSITY | Facility: HOSPITAL | Age: 59
Discharge: HOME | End: 2024-10-16
Payer: COMMERCIAL

## 2024-10-16 ENCOUNTER — HOSPITAL ENCOUNTER (OUTPATIENT)
Dept: MAMMOGRAPHY | Facility: HOSPITAL | Age: 59
Discharge: HOME | End: 2024-10-16
Payer: COMMERCIAL

## 2024-10-16 DIAGNOSIS — Z12.31 SCREENING MAMMOGRAM FOR BREAST CANCER: ICD-10-CM

## 2024-10-16 DIAGNOSIS — Z78.0 POSTMENOPAUSAL: ICD-10-CM

## 2024-10-16 PROCEDURE — 77063 BREAST TOMOSYNTHESIS BI: CPT

## 2024-10-16 PROCEDURE — 77080 DXA BONE DENSITY AXIAL: CPT

## 2024-10-16 PROCEDURE — 77067 SCR MAMMO BI INCL CAD: CPT

## 2024-10-31 ENCOUNTER — PREP FOR SURGERY (OUTPATIENT)
Dept: OTHER | Facility: HOSPITAL | Age: 59
End: 2024-10-31
Payer: COMMERCIAL

## 2024-10-31 ENCOUNTER — CLINICAL SUPPORT (OUTPATIENT)
Dept: GASTROENTEROLOGY | Facility: CLINIC | Age: 59
End: 2024-10-31
Payer: COMMERCIAL

## 2024-10-31 DIAGNOSIS — Z86.0100 HISTORY OF COLON POLYPS: Primary | ICD-10-CM

## 2024-10-31 DIAGNOSIS — Z12.11 SCREENING FOR MALIGNANT NEOPLASM OF COLON: ICD-10-CM

## 2024-10-31 RX ORDER — POLYETHYLENE GLYCOL 3350, SODIUM CHLORIDE, SODIUM BICARBONATE, POTASSIUM CHLORIDE 420; 11.2; 5.72; 1.48 G/4L; G/4L; G/4L; G/4L
POWDER, FOR SOLUTION ORAL
Qty: 4000 ML | Refills: 0 | Status: SHIPPED | OUTPATIENT
Start: 2024-10-31

## 2024-10-31 NOTE — PROGRESS NOTES
Kacie Lynch  1965  58 y.o.    Reason for call: Screening Colonoscopy  Prep prescribed: Malcolm  Prep instructions reviewed with patient and sent to patient via [employer]  Is the patient currently on any injectable or oral medications for weight loss or diabetes? No  Clearance needed? No  If yes, what clearance is needed? N/A  Clearance has been requested from N/A  The patient has been scheduled for: Colonoscopy    If scheduled for screening colonoscopy Kacie Lynch is aware they have been scheduled for a screening colonoscopy. Patient has expressed they are not having any symptoms at all.     After your procedure, you will be contacted with results. Please confirm the best phone # to reach the patient: [phone]  Family history of colon cancer? No  If yes, indicate relative: N/A  Tentative Procedure Date: 12/20/2024    Family History   Problem Relation Age of Onset    Breast cancer Mother 72    Cancer Mother     Depression Mother     Breast cancer Other 60    Stroke Other     Lung cancer Other 68    Stroke Other     Depression Sister     Depression Sister     Drug abuse Sister     Stroke Sister     Depression Sister     Mental illness Sister      Past Medical History:   Diagnosis Date    Abdominal hernia 01/05/2021    ADHD (attention deficit hyperactivity disorder) 04/1975    Allergic 05/1979    Allergies     Anxiety 10/2001    Arthritis 07/2010    Bipolar disorder     Cataract 02/2024    Chest pain     Depression 02/25/2020    2009    Diverticulitis     Diverticulosis 11/2018    GERD (gastroesophageal reflux disease) 05/2008    Hair loss 01/05/2021    Headache O5/2000    Heart murmur     CHILD    Hypertension 06/02/2014    Irritable bowel syndrome 07/2007    Leukocytopenia 10/09/2020    Low back pain     Obesity 05/1999    Urinary incontinence 06/26/2014    Vitamin D deficiency 06/04/2014     Allergies   Allergen Reactions    Penicillins Anaphylaxis    Amlodipine Cough    Hydralazine  Swelling    Labetalol Swelling    Metoprolol Swelling    Amoxicillin Rash    Cariprazine Unknown - High Severity and Rash    Chlorthalidone Rash    Ciprofloxacin Rash    Clonidine Derivatives Cough    Hydrochlorothiazide Rash    Lamotrigine GI Intolerance and Cough    Latex Rash    Lisinopril Cough    Losartan Cough    Oxycodone-Acetaminophen Itching     Past Surgical History:   Procedure Laterality Date    ABDOMINAL HERNIA REPAIR      BLADDER SURGERY      TENSION FREE VAGINAL TAPING    CHOLECYSTECTOMY  09/20/2012    COLONOSCOPY  06/26/2018    HYSTERECTOMY      PARTIAL    SUBTOTAL HYSTERECTOMY      TUBAL ABDOMINAL LIGATION  2007     Social History     Socioeconomic History    Marital status: Single   Tobacco Use    Smoking status: Never     Passive exposure: Never    Smokeless tobacco: Never   Vaping Use    Vaping status: Never Used   Substance and Sexual Activity    Alcohol use: Not Currently    Drug use: Not Currently     Types: Marijuana    Sexual activity: Not Currently     Partners: Male     Birth control/protection: Condom       Current Outpatient Medications:     albuterol sulfate  (90 Base) MCG/ACT inhaler, Inhale 2 puffs Every 4 (Four) Hours As Needed for Wheezing or Shortness of Air., Disp: 18 g, Rfl: 0    ALPRAZolam (XANAX) 0.5 MG tablet, Take 1 tablet by mouth 3 times a day., Disp: , Rfl:     busPIRone (BUSPAR) 7.5 MG tablet, Take 1 tablet by mouth 3 (Three) Times a Day., Disp: 270 tablet, Rfl: 1    cetirizine (zyrTEC) 10 MG tablet, Take 1 tablet by mouth Daily., Disp: 90 tablet, Rfl: 3    dicyclomine (BENTYL) 10 MG capsule, Take 1 capsule by mouth 4 (Four) Times a Day As Needed (stomach pains)., Disp: 60 capsule, Rfl: 2    esomeprazole (nexIUM) 40 MG capsule, , Disp: , Rfl:     linaclotide (Linzess) 145 MCG capsule capsule, Take 1 capsule by mouth Daily., Disp: , Rfl:     ondansetron ODT (ZOFRAN-ODT) 8 MG disintegrating tablet, Place 1 tablet on the tongue Every 8 (Eight) Hours As Needed., Disp:  , Rfl:     oxybutynin XL (DITROPAN-XL) 5 MG 24 hr tablet, Take 1 tablet by mouth Daily., Disp: 30 tablet, Rfl: 2    tiZANidine (ZANAFLEX) 4 MG tablet, Take 1 tablet by mouth Every 8 (Eight) Hours., Disp: , Rfl:     traZODone (DESYREL) 300 MG tablet, Take 1 tablet by mouth every night at bedtime., Disp: 30 tablet, Rfl: 5    HYDROcodone-acetaminophen (NORCO) 7.5-325 MG per tablet, Take 1 tablet by mouth Every 6 (Six) Hours As Needed for Severe Pain., Disp: 12 tablet, Rfl: 0    spironolactone (ALDACTONE) 50 MG tablet, Take 1 tablet by mouth Daily., Disp: , Rfl:

## 2024-11-01 PROBLEM — Z12.11 SCREENING FOR MALIGNANT NEOPLASM OF COLON: Status: ACTIVE | Noted: 2024-10-31

## 2024-11-01 PROBLEM — Z86.0100 HISTORY OF COLON POLYPS: Status: ACTIVE | Noted: 2024-10-31

## 2024-11-27 NOTE — PROGRESS NOTES
Chief Complaint: Urinary Incontinence    Subjective         History of Present Illness  Kacie Lynch is a 59 y.o. female presents to [facility] to be seen for follow-up. The patient was previously seen by me in the office on 10/2/2024 for urinary frequency, OAB and incontinence.  The patient was started on oxybutynin at that visit.  Behavioral modifications were encouraged.  The patient is here to follow-up.    Did not see any change with oxybutynin.     Previous 10/2/2024:  Patient presents reporting she was given a dose of PCN last year, which is listed as an allergy, when this occurred she started having urinary incontinence. She reports since that time she has had more problems with incontinence.      Has never been treated for OAB.      Frequency-admits   Urgency-admits   Incontinence- admits with urgency and with cough/laugh/sneeze   Nocturia-3-4   GH-denies   History of stones-denies    surgeries-possible bladder repair with birth of her daughter   Family history of  malignancy-denies   Cardiopulmonary-denies   Anticoagulants-denies   Smoker-denies    Objective     Past Medical History:   Diagnosis Date    Abdominal hernia 01/05/2021    ADHD (attention deficit hyperactivity disorder) 04/1975    Allergic 05/1979    Allergies     Anxiety 10/2001    Arthritis 07/2010    Bipolar disorder     Cataract 02/2024    Chest pain     Depression 02/25/2020    2009    Diverticulitis     Diverticulosis 11/2018    GERD (gastroesophageal reflux disease) 05/2008    Hair loss 01/05/2021    Headache O5/2000    Heart murmur     CHILD    Hypertension 06/02/2014    Irritable bowel syndrome 07/2007    Leukocytopenia 10/09/2020    Low back pain     Obesity 05/1999    Urinary incontinence 06/26/2014    Vitamin D deficiency 06/04/2014       Past Surgical History:   Procedure Laterality Date    ABDOMINAL HERNIA REPAIR      BLADDER SURGERY      TENSION FREE VAGINAL TAPING    CHOLECYSTECTOMY   09/20/2012    COLONOSCOPY  06/26/2018    HYSTERECTOMY      PARTIAL    SUBTOTAL HYSTERECTOMY      TUBAL ABDOMINAL LIGATION  2007         Current Outpatient Medications:     acyclovir (ZOVIRAX) 800 MG tablet, Take 1 tablet by mouth 2 (Two) Times a Day., Disp: , Rfl:     busPIRone (BUSPAR) 7.5 MG tablet, Take 1 tablet by mouth 3 (Three) Times a Day., Disp: 270 tablet, Rfl: 1    cetirizine (zyrTEC) 10 MG tablet, Take 1 tablet by mouth Daily., Disp: 90 tablet, Rfl: 3    dicyclomine (BENTYL) 10 MG capsule, Take 1 capsule by mouth 4 (Four) Times a Day As Needed (stomach pains)., Disp: 60 capsule, Rfl: 2    esomeprazole (nexIUM) 40 MG capsule, Take 1 capsule by mouth Every Morning Before Breakfast. Indications: Gastroesophageal Reflux Disease, Disp: , Rfl:     linaclotide (Linzess) 145 MCG capsule capsule, Take 1 capsule by mouth Daily. Indications: Constipation caused by Irritable Bowel Syndrome, Disp: , Rfl:     Nurtec 75 MG dispersible tablet, DISSOLVE 1 TABLET BY MOUTH EVERY OTHER DAY, Disp: , Rfl:     ondansetron ODT (ZOFRAN-ODT) 8 MG disintegrating tablet, Place 1 tablet on the tongue Every 8 (Eight) Hours As Needed. Indications: Nausea and Vomiting, Disp: , Rfl:     SUMAtriptan (IMITREX) 100 MG tablet, Take 0.5 tablets by mouth 1 (One) Time As Needed., Disp: , Rfl:     tiZANidine (ZANAFLEX) 4 MG tablet, Take 1 tablet by mouth Every 8 (Eight) Hours. Indications: Muscle Spasticity, Disp: , Rfl:     traZODone (DESYREL) 300 MG tablet, Take 1 tablet by mouth every night at bedtime., Disp: 30 tablet, Rfl: 5    fesoterodine fumarate (TOVIAZ ER) 4 MG tablet sustained-release 24 hour tablet, Take 1 tablet by mouth Daily., Disp: 30 tablet, Rfl: 2  No current facility-administered medications for this visit.    Allergies   Allergen Reactions    Penicillins Anaphylaxis    Amlodipine Cough    Hydralazine Swelling    Labetalol Swelling    Metoprolol Swelling    Amoxicillin Rash    Cariprazine Unknown - High Severity and Rash     "Chlorthalidone Rash    Ciprofloxacin Rash    Clonidine Derivatives Cough    Hydrochlorothiazide Rash    Lamotrigine GI Intolerance and Cough    Latex Rash    Lisinopril Cough    Losartan Cough    Oxycodone-Acetaminophen Itching        Family History   Problem Relation Age of Onset    Breast cancer Mother 72    Cancer Mother     Depression Mother     Hypertension Mother     Depression Sister     Depression Sister     Drug abuse Sister     Stroke Sister     Depression Sister     Mental illness Sister     Breast cancer Other 60    Stroke Other     Lung cancer Other 68    Stroke Other        Social History     Socioeconomic History    Marital status: Single   Tobacco Use    Smoking status: Never     Passive exposure: Never    Smokeless tobacco: Never   Vaping Use    Vaping status: Never Used   Substance and Sexual Activity    Alcohol use: Not Currently    Drug use: Not Currently     Types: Marijuana    Sexual activity: Not Currently     Partners: Male     Birth control/protection: Condom       Vital Signs:   Resp 14   Ht 160 cm (63\")   Wt 99 kg (218 lb 4.1 oz)   BMI 38.66 kg/m²      Physical Exam  Vitals reviewed.   Constitutional:       Appearance: Normal appearance.   Neurological:      General: No focal deficit present.      Mental Status: She is alert and oriented to person, place, and time.   Psychiatric:         Mood and Affect: Mood normal.         Behavior: Behavior normal.          Result Review :   The following data was reviewed by: CHAPO Ya on 11/20/2024:     Bladder Scan interpretation 12/03/2024    Estimation of residual urine via BVI 3000 Verathon Bladder Scan  MA/nurse performing: Neelima JOY MA  Residual Urine: 0 ml  Indication: Urinary incontinence, unspecified type    Urinary frequency    Overactive bladder   Position: Supine  Examination: Incremental scanning of the suprapubic area using 2.0 MHz transducer using copious amounts of acoustic gel.   Findings: An anechoic area was " demonstrated which represented the bladder, with measurement of residual urine as noted. I inspected this myself. In that the residual urine was stable or insignificant, refer to plan for treatment and plan necessary at this time.           Procedures        Assessment and Plan    Diagnoses and all orders for this visit:    1. Urinary incontinence, unspecified type (Primary)  -     Bladder Scan  -     fesoterodine fumarate (TOVIAZ ER) 4 MG tablet sustained-release 24 hour tablet; Take 1 tablet by mouth Daily.  Dispense: 30 tablet; Refill: 2    2. Urinary frequency  -     fesoterodine fumarate (TOVIAZ ER) 4 MG tablet sustained-release 24 hour tablet; Take 1 tablet by mouth Daily.  Dispense: 30 tablet; Refill: 2    3. Overactive bladder    Given that she did not see any change with oxybutynin, we are to stop oxybutynin and switch her to fesoterodine.    I will see her back in 8 weeks or sooner for new concerns.    Follow Up   Return in about 8 weeks (around 1/28/2025).  Patient was given instructions and counseling regarding her condition or for health maintenance advice. Please see specific information pulled into the AVS if appropriate.         This document has been electronically signed by CHAPO Ya  December 3, 2024 10:17 EST

## 2024-12-01 ENCOUNTER — HOSPITAL ENCOUNTER (INPATIENT)
Facility: HOSPITAL | Age: 59
LOS: 1 days | Discharge: HOME | DRG: 885 | End: 2024-12-02
Attending: PSYCHIATRY & NEUROLOGY | Admitting: PSYCHIATRY & NEUROLOGY
Payer: COMMERCIAL

## 2024-12-01 ENCOUNTER — PREP FOR SURGERY (OUTPATIENT)
Dept: OTHER | Facility: HOSPITAL | Age: 59
End: 2024-12-01
Payer: COMMERCIAL

## 2024-12-01 ENCOUNTER — HOSPITAL ENCOUNTER (EMERGENCY)
Facility: HOSPITAL | Age: 59
Discharge: PSYCHIATRIC OR SUBSTANCE USE FACILITY | DRG: 885 | End: 2024-12-01
Attending: EMERGENCY MEDICINE | Admitting: EMERGENCY MEDICINE
Payer: COMMERCIAL

## 2024-12-01 VITALS
TEMPERATURE: 97.6 F | DIASTOLIC BLOOD PRESSURE: 91 MMHG | HEART RATE: 74 BPM | SYSTOLIC BLOOD PRESSURE: 156 MMHG | BODY MASS INDEX: 38.67 KG/M2 | HEIGHT: 63 IN | RESPIRATION RATE: 20 BRPM | WEIGHT: 218.26 LBS | OXYGEN SATURATION: 100 %

## 2024-12-01 DIAGNOSIS — F29 PSYCHOSIS: ICD-10-CM

## 2024-12-01 DIAGNOSIS — F41.9 ANXIETY: ICD-10-CM

## 2024-12-01 DIAGNOSIS — F29 PSYCHOSIS: Primary | ICD-10-CM

## 2024-12-01 DIAGNOSIS — F19.10 SUBSTANCE ABUSE: ICD-10-CM

## 2024-12-01 DIAGNOSIS — R45.851 SUICIDAL IDEATION: Primary | ICD-10-CM

## 2024-12-01 LAB
ALBUMIN SERPL-MCNC: 4.4 G/DL (ref 3.5–5.2)
ALBUMIN/GLOB SERPL: 1.5 G/DL
ALP SERPL-CCNC: 104 U/L (ref 39–117)
ALT SERPL W P-5'-P-CCNC: 11 U/L (ref 1–33)
AMPHET+METHAMPHET UR QL: NEGATIVE
AMPHETAMINES UR QL: NEGATIVE
ANION GAP SERPL CALCULATED.3IONS-SCNC: 12.5 MMOL/L (ref 5–15)
APAP SERPL-MCNC: <5 MCG/ML (ref 0–30)
AST SERPL-CCNC: 16 U/L (ref 1–32)
BARBITURATES UR QL SCN: NEGATIVE
BASOPHILS # BLD AUTO: 0.03 10*3/MM3 (ref 0–0.2)
BASOPHILS NFR BLD AUTO: 0.7 % (ref 0–1.5)
BENZODIAZ UR QL SCN: POSITIVE
BILIRUB SERPL-MCNC: 0.3 MG/DL (ref 0–1.2)
BUN SERPL-MCNC: 10 MG/DL (ref 6–20)
BUN/CREAT SERPL: 13.5 (ref 7–25)
BUPRENORPHINE SERPL-MCNC: NEGATIVE NG/ML
CALCIUM SPEC-SCNC: 9.1 MG/DL (ref 8.6–10.5)
CANNABINOIDS SERPL QL: NEGATIVE
CHLORIDE SERPL-SCNC: 103 MMOL/L (ref 98–107)
CO2 SERPL-SCNC: 23.5 MMOL/L (ref 22–29)
COCAINE UR QL: POSITIVE
CREAT SERPL-MCNC: 0.74 MG/DL (ref 0.57–1)
DEPRECATED RDW RBC AUTO: 39.9 FL (ref 37–54)
EGFRCR SERPLBLD CKD-EPI 2021: 93.3 ML/MIN/1.73
EOSINOPHIL # BLD AUTO: 0.09 10*3/MM3 (ref 0–0.4)
EOSINOPHIL NFR BLD AUTO: 2 % (ref 0.3–6.2)
ERYTHROCYTE [DISTWIDTH] IN BLOOD BY AUTOMATED COUNT: 13.2 % (ref 12.3–15.4)
ETHANOL BLD-MCNC: <10 MG/DL (ref 0–10)
ETHANOL UR QL: <0.01 %
FENTANYL UR-MCNC: NEGATIVE NG/ML
GLOBULIN UR ELPH-MCNC: 2.9 GM/DL
GLUCOSE SERPL-MCNC: 117 MG/DL (ref 65–99)
HCG INTACT+B SERPL-ACNC: 1.55 MIU/ML
HCT VFR BLD AUTO: 38.4 % (ref 34–46.6)
HGB BLD-MCNC: 12.9 G/DL (ref 12–15.9)
HOLD SPECIMEN: NORMAL
HOLD SPECIMEN: NORMAL
IMM GRANULOCYTES # BLD AUTO: 0.01 10*3/MM3 (ref 0–0.05)
IMM GRANULOCYTES NFR BLD AUTO: 0.2 % (ref 0–0.5)
LYMPHOCYTES # BLD AUTO: 1.94 10*3/MM3 (ref 0.7–3.1)
LYMPHOCYTES NFR BLD AUTO: 43.5 % (ref 19.6–45.3)
MCH RBC QN AUTO: 27.9 PG (ref 26.6–33)
MCHC RBC AUTO-ENTMCNC: 33.6 G/DL (ref 31.5–35.7)
MCV RBC AUTO: 83.1 FL (ref 79–97)
METHADONE UR QL SCN: NEGATIVE
MONOCYTES # BLD AUTO: 0.3 10*3/MM3 (ref 0.1–0.9)
MONOCYTES NFR BLD AUTO: 6.7 % (ref 5–12)
NEUTROPHILS NFR BLD AUTO: 2.09 10*3/MM3 (ref 1.7–7)
NEUTROPHILS NFR BLD AUTO: 46.9 % (ref 42.7–76)
NRBC BLD AUTO-RTO: 0 /100 WBC (ref 0–0.2)
OPIATES UR QL: NEGATIVE
OXYCODONE UR QL SCN: NEGATIVE
PCP UR QL SCN: NEGATIVE
PLATELET # BLD AUTO: 199 10*3/MM3 (ref 140–450)
PMV BLD AUTO: 10 FL (ref 6–12)
POTASSIUM SERPL-SCNC: 3.8 MMOL/L (ref 3.5–5.2)
PROT SERPL-MCNC: 7.3 G/DL (ref 6–8.5)
RBC # BLD AUTO: 4.62 10*6/MM3 (ref 3.77–5.28)
SALICYLATES SERPL-MCNC: <0.3 MG/DL
SODIUM SERPL-SCNC: 139 MMOL/L (ref 136–145)
T4 FREE SERPL-MCNC: 1.33 NG/DL (ref 0.92–1.68)
TRICYCLICS UR QL SCN: NEGATIVE
TSH SERPL DL<=0.05 MIU/L-ACNC: 0.72 UIU/ML (ref 0.27–4.2)
WBC NRBC COR # BLD AUTO: 4.46 10*3/MM3 (ref 3.4–10.8)
WHOLE BLOOD HOLD COAG: NORMAL
WHOLE BLOOD HOLD SPECIMEN: NORMAL

## 2024-12-01 PROCEDURE — 99285 EMERGENCY DEPT VISIT HI MDM: CPT

## 2024-12-01 PROCEDURE — 84443 ASSAY THYROID STIM HORMONE: CPT | Performed by: EMERGENCY MEDICINE

## 2024-12-01 PROCEDURE — 82077 ASSAY SPEC XCP UR&BREATH IA: CPT | Performed by: EMERGENCY MEDICINE

## 2024-12-01 PROCEDURE — 85025 COMPLETE CBC W/AUTO DIFF WBC: CPT | Performed by: EMERGENCY MEDICINE

## 2024-12-01 PROCEDURE — 99291 CRITICAL CARE FIRST HOUR: CPT

## 2024-12-01 PROCEDURE — 80179 DRUG ASSAY SALICYLATE: CPT | Performed by: EMERGENCY MEDICINE

## 2024-12-01 PROCEDURE — 80053 COMPREHEN METABOLIC PANEL: CPT | Performed by: EMERGENCY MEDICINE

## 2024-12-01 PROCEDURE — 36415 COLL VENOUS BLD VENIPUNCTURE: CPT

## 2024-12-01 PROCEDURE — 80307 DRUG TEST PRSMV CHEM ANLYZR: CPT | Performed by: EMERGENCY MEDICINE

## 2024-12-01 PROCEDURE — 84702 CHORIONIC GONADOTROPIN TEST: CPT | Performed by: EMERGENCY MEDICINE

## 2024-12-01 PROCEDURE — 84439 ASSAY OF FREE THYROXINE: CPT | Performed by: EMERGENCY MEDICINE

## 2024-12-01 PROCEDURE — 80143 DRUG ASSAY ACETAMINOPHEN: CPT | Performed by: EMERGENCY MEDICINE

## 2024-12-01 RX ORDER — LOPERAMIDE HYDROCHLORIDE 2 MG/1
2 CAPSULE ORAL
Status: CANCELLED | OUTPATIENT
Start: 2024-12-01

## 2024-12-01 RX ORDER — IBUPROFEN 400 MG/1
400 TABLET, FILM COATED ORAL EVERY 6 HOURS PRN
Status: CANCELLED | OUTPATIENT
Start: 2024-12-01

## 2024-12-01 RX ORDER — HYDROXYZINE HYDROCHLORIDE 25 MG/1
50 TABLET, FILM COATED ORAL EVERY 6 HOURS PRN
Status: CANCELLED | OUTPATIENT
Start: 2024-12-01

## 2024-12-01 RX ORDER — NICOTINE 21 MG/24HR
1 PATCH, TRANSDERMAL 24 HOURS TRANSDERMAL DAILY PRN
Status: DISCONTINUED | OUTPATIENT
Start: 2024-12-01 | End: 2024-12-02 | Stop reason: HOSPADM

## 2024-12-01 RX ORDER — LORAZEPAM 2 MG/ML
2 INJECTION INTRAMUSCULAR EVERY 4 HOURS PRN
Status: CANCELLED | OUTPATIENT
Start: 2024-12-01 | End: 2024-12-06

## 2024-12-01 RX ORDER — LORAZEPAM 2 MG/1
2 TABLET ORAL EVERY 4 HOURS PRN
Status: DISCONTINUED | OUTPATIENT
Start: 2024-12-01 | End: 2024-12-02 | Stop reason: HOSPADM

## 2024-12-01 RX ORDER — HYDROXYZINE HYDROCHLORIDE 25 MG/1
50 TABLET, FILM COATED ORAL EVERY 6 HOURS PRN
Status: DISCONTINUED | OUTPATIENT
Start: 2024-12-01 | End: 2024-12-02 | Stop reason: HOSPADM

## 2024-12-01 RX ORDER — LOPERAMIDE HYDROCHLORIDE 2 MG/1
2 CAPSULE ORAL
Status: DISCONTINUED | OUTPATIENT
Start: 2024-12-01 | End: 2024-12-02 | Stop reason: HOSPADM

## 2024-12-01 RX ORDER — DIPHENHYDRAMINE HCL 50 MG
50 CAPSULE ORAL EVERY 4 HOURS PRN
Status: DISCONTINUED | OUTPATIENT
Start: 2024-12-01 | End: 2024-12-02 | Stop reason: HOSPADM

## 2024-12-01 RX ORDER — TRAZODONE HYDROCHLORIDE 100 MG/1
100 TABLET ORAL NIGHTLY PRN
Status: DISCONTINUED | OUTPATIENT
Start: 2024-12-01 | End: 2024-12-02 | Stop reason: HOSPADM

## 2024-12-01 RX ORDER — TRAZODONE HYDROCHLORIDE 100 MG/1
100 TABLET ORAL NIGHTLY PRN
Status: CANCELLED | OUTPATIENT
Start: 2024-12-01

## 2024-12-01 RX ORDER — HALOPERIDOL 5 MG/ML
5 INJECTION INTRAMUSCULAR EVERY 4 HOURS PRN
Status: DISCONTINUED | OUTPATIENT
Start: 2024-12-01 | End: 2024-12-02 | Stop reason: HOSPADM

## 2024-12-01 RX ORDER — DIPHENHYDRAMINE HYDROCHLORIDE 50 MG/ML
50 INJECTION INTRAMUSCULAR; INTRAVENOUS EVERY 4 HOURS PRN
Status: DISCONTINUED | OUTPATIENT
Start: 2024-12-01 | End: 2024-12-02 | Stop reason: HOSPADM

## 2024-12-01 RX ORDER — RISPERIDONE 2 MG/1
2 TABLET ORAL 2 TIMES DAILY
Status: DISCONTINUED | OUTPATIENT
Start: 2024-12-01 | End: 2024-12-02 | Stop reason: HOSPADM

## 2024-12-01 RX ORDER — LORAZEPAM 2 MG/1
2 TABLET ORAL EVERY 4 HOURS PRN
Status: CANCELLED | OUTPATIENT
Start: 2024-12-01 | End: 2024-12-08

## 2024-12-01 RX ORDER — DIPHENHYDRAMINE HYDROCHLORIDE 50 MG/ML
50 INJECTION INTRAMUSCULAR; INTRAVENOUS EVERY 4 HOURS PRN
Status: CANCELLED | OUTPATIENT
Start: 2024-12-01

## 2024-12-01 RX ORDER — RISPERIDONE 2 MG/1
2 TABLET ORAL 2 TIMES DAILY
Status: CANCELLED | OUTPATIENT
Start: 2024-12-01

## 2024-12-01 RX ORDER — ALUMINA, MAGNESIA, AND SIMETHICONE 2400; 2400; 240 MG/30ML; MG/30ML; MG/30ML
15 SUSPENSION ORAL EVERY 6 HOURS PRN
Status: CANCELLED | OUTPATIENT
Start: 2024-12-01

## 2024-12-01 RX ORDER — HALOPERIDOL 5 MG/1
5 TABLET ORAL EVERY 4 HOURS PRN
Status: DISCONTINUED | OUTPATIENT
Start: 2024-12-01 | End: 2024-12-02 | Stop reason: HOSPADM

## 2024-12-01 RX ORDER — LORAZEPAM 2 MG/ML
2 INJECTION INTRAMUSCULAR EVERY 4 HOURS PRN
Status: DISCONTINUED | OUTPATIENT
Start: 2024-12-01 | End: 2024-12-02 | Stop reason: HOSPADM

## 2024-12-01 RX ORDER — DIPHENHYDRAMINE HCL 50 MG
50 CAPSULE ORAL EVERY 4 HOURS PRN
Status: CANCELLED | OUTPATIENT
Start: 2024-12-01

## 2024-12-01 RX ORDER — HYDROXYZINE HYDROCHLORIDE 25 MG/1
25 TABLET, FILM COATED ORAL 3 TIMES DAILY PRN
Status: DISCONTINUED | OUTPATIENT
Start: 2024-12-01 | End: 2024-12-01 | Stop reason: HOSPADM

## 2024-12-01 RX ORDER — HALOPERIDOL 5 MG/1
5 TABLET ORAL EVERY 4 HOURS PRN
Status: CANCELLED | OUTPATIENT
Start: 2024-12-01

## 2024-12-01 RX ORDER — NICOTINE 21 MG/24HR
1 PATCH, TRANSDERMAL 24 HOURS TRANSDERMAL DAILY PRN
Status: CANCELLED | OUTPATIENT
Start: 2024-12-01

## 2024-12-01 RX ORDER — IBUPROFEN 400 MG/1
400 TABLET, FILM COATED ORAL EVERY 6 HOURS PRN
Status: DISCONTINUED | OUTPATIENT
Start: 2024-12-01 | End: 2024-12-02 | Stop reason: HOSPADM

## 2024-12-01 RX ORDER — ALUMINA, MAGNESIA, AND SIMETHICONE 2400; 2400; 240 MG/30ML; MG/30ML; MG/30ML
15 SUSPENSION ORAL EVERY 6 HOURS PRN
Status: DISCONTINUED | OUTPATIENT
Start: 2024-12-01 | End: 2024-12-02 | Stop reason: HOSPADM

## 2024-12-01 RX ORDER — HALOPERIDOL 5 MG/ML
5 INJECTION INTRAMUSCULAR EVERY 4 HOURS PRN
Status: CANCELLED | OUTPATIENT
Start: 2024-12-01

## 2024-12-01 RX ADMIN — RISPERIDONE 2 MG: 2 TABLET, FILM COATED ORAL at 21:02

## 2024-12-01 RX ADMIN — HYDROXYZINE HYDROCHLORIDE 50 MG: 25 TABLET ORAL at 13:12

## 2024-12-01 RX ADMIN — HYDROXYZINE HYDROCHLORIDE 50 MG: 25 TABLET ORAL at 21:05

## 2024-12-01 RX ADMIN — IBUPROFEN 400 MG: 400 TABLET, FILM COATED ORAL at 23:04

## 2024-12-01 RX ADMIN — TRAZODONE HYDROCHLORIDE 100 MG: 100 TABLET ORAL at 21:02

## 2024-12-01 RX ADMIN — HYDROXYZINE HYDROCHLORIDE 25 MG: 25 TABLET, FILM COATED ORAL at 11:18

## 2024-12-01 NOTE — SIGNIFICANT NOTE
12/01/24 1034   Behavior WDL   Behavior WDL interactions;motor movement   Interactions guarded   Motor Movement restless;other (see comments)  (rocking back/forth)   Emotion Mood WDL   Emotion/Mood/Affect WDL affect;emotion mood   Affect affect consistent with mood   Emotion/Mood anxious   Patient rated anxiety level   (pt unable to answer)   Mental Health   Why did the patient not complete the Depression Screen questions? Patient unable to answer   Speech WDL   Speech WDL speech;X   Speech soft/quiet   Perceptual State WDL   Perceptual State WDL   (pt unable to answer)   Thought Process WDL   Thought Process WDL   (unable to assess due to pt not able to answer at times)   Intellectual Performance WDL   Intellectual Performance WDL intellectual performance;level of consciousness;WDL   Intellectual Performance able to comprehend   Level of Consciousness Alert   Stress   Do you feel stress - tense, restless, nervous, or anxious, or unable to sleep at night because your mind is troubled all the time - these days? Very much   Coping/Stress   Major Change/Loss/Stressor unknown cause;mental health condition   Patient Personal Strengths self-reliant   Sources of Support adult child(sonido)   Techniques to [place] with Loss/Stress/Change none   Reaction to Health Status anxious   Understanding of Condition and Treatment unable to assess   Developmental Stage (Eriksson's Stages of Development)   Developmental Stage Stage 7 (35-65 years/Middle Adulthood) Generativity vs. Stagnation   [place] Suicide Severity Rating Scale (Screener/Recent Self-Report)   1. Wish to be Dead (Past 1 Month) Y   2. Non-Specific Active Suicidal Thoughts (Past 1 Month) Y   3. Active Suicidal Ideation with any Methods (Not Plan) Without Intent to Act (Past 1 Month) N   4. Active Suicidal Ideation with Some Intent to Act, Without Specific Plan (Past 1 Month) N   5. Active Suicidal Ideation with Specific Plan and Intent (Past 1 Month) N   6. Suicidal  Behavior (Lifetime) N   6. Suicidal Behavior (3 Months) N   6. Suicidal Behavior (Description) Pt states she has suicidal thoughts but is not able to elaborate during assessment   Suicidal Ideation (Past 1 Month)   Wish to be Dead Description (Past 1 Month) experiencing ongoing SI thoughts but does not expand any further   Non-Specific Active Suicidal Thought Description (Past 1 Month) ongoing SI thoughts   Violence Assessment Tool Risk Indicators   Assessment Type Reassessment   History of Violence 0   Confused 1   Irritable 1   Boisterous 0   Verbal Threats 0   Physical Threats 0   Attacking Objects 0   Agitated/Impulsive 0   Paranoid/Suspicious 0   Substance Intoxication/Withdrawal 0   Socially Inappropriate/Disruptive Behavior 0   Body Language 0   Violence Assessment Tool Total Score 2     SW met with pt at bedside this date at the request of the provider. Pt was observed by SW to be sitting in a chain in the room, rocking back and forth crying. Pt would randomly scratch aggressively at her skin during assessment as well. Pt was not able to identify where she was or how she arrived at the ED this date during assessment. Pt reports ongoing SI thoughts but was not able to expand any further. Pt reports history of anxiety and panic attacks daily, all day. Pt reports that she resides alone at home with her dog. Pt reports that she does not have an outpatient provider at this time as her retired. SW notes that pt would make limited to no eye contact this date and began tapping her forehead against the wall. Pt states she does have anxiety medications, however, she does not what she takes or when she last took them. SW updated provider.   SUNNY Armstrong CSW

## 2024-12-01 NOTE — ED PROVIDER NOTES
"Time: 12:16 PM EST  Date of encounter:  12/1/2024  Independent Historian/Clinical History and Information was obtained by:   Patient  Chief Complaint: Having bad thoughts    History is limited by:  Poor history provider    History of Present Illness:  Patient is a 59 y.o. year old female who presents to the emergency department for evaluation of thoughts that she is having.  Patient is that she has been having bad thoughts.  Patient after getting the hospital was very reluctant to talk.  Patient states \"there is a lot going on\".  Patient states she is having increased anxiety and feeling panic.  Patient states she is having a hard time remembering things.  Patient feels like it is getting worse.  Patient admits to having suicidal thoughts.  Patient however will not answer if she is actively having thoughts of wanting to harm herself.  Patient also will not state whether or not she has a plan to harm herself.  Patient states that she physic she is forgetting things in fact she states she is not sure even what day it is.  Patient also reports to seeing bugs but she is very fearful of bugs.    HPI    Patient Care Team  Primary Care Provider: Reema Anderson PA-C    Past Medical History:     Allergies   Allergen Reactions    Penicillins Anaphylaxis    Amlodipine Cough    Hydralazine Swelling    Labetalol Swelling    Metoprolol Swelling    Amoxicillin Rash    Cariprazine Unknown - High Severity and Rash    Chlorthalidone Rash    Ciprofloxacin Rash    Clonidine Derivatives Cough    Hydrochlorothiazide Rash    Lamotrigine GI Intolerance and Cough    Latex Rash    Lisinopril Cough    Losartan Cough    Oxycodone-Acetaminophen Itching     Past Medical History:   Diagnosis Date    Abdominal hernia 01/05/2021    ADHD (attention deficit hyperactivity disorder) 04/1975    Allergic 05/1979    Allergies     Anxiety 10/2001    Arthritis 07/2010    Bipolar disorder     Cataract 02/2024    Chest pain     Depression 02/25/2020 2009 "    Diverticulitis     Diverticulosis 11/2018    GERD (gastroesophageal reflux disease) 05/2008    Hair loss 01/05/2021    Headache O5/2000    Heart murmur     CHILD    Hypertension 06/02/2014    Irritable bowel syndrome 07/2007    Leukocytopenia 10/09/2020    Low back pain     Obesity 05/1999    Urinary incontinence 06/26/2014    Vitamin D deficiency 06/04/2014     Past Surgical History:   Procedure Laterality Date    ABDOMINAL HERNIA REPAIR      BLADDER SURGERY      TENSION FREE VAGINAL TAPING    CHOLECYSTECTOMY  09/20/2012    COLONOSCOPY  06/26/2018    HYSTERECTOMY      PARTIAL    SUBTOTAL HYSTERECTOMY      TUBAL ABDOMINAL LIGATION  2007     Family History   Problem Relation Age of Onset    Breast cancer Mother 72    Cancer Mother     Depression Mother     Breast cancer Other 60    Stroke Other     Lung cancer Other 68    Stroke Other     Depression Sister     Depression Sister     Drug abuse Sister     Stroke Sister     Depression Sister     Mental illness Sister        Home Medications:  Prior to Admission medications    Medication Sig Start Date End Date Taking? Authorizing Provider   albuterol sulfate  (90 Base) MCG/ACT inhaler Inhale 2 puffs Every 4 (Four) Hours As Needed for Wheezing or Shortness of Air. 1/17/22   Pato Navarrete,    ALPRAZolam (XANAX) 0.5 MG tablet Take 1 tablet by mouth 3 times a day. 3/11/24   ProviderJuan Carlos MD   busPIRone (BUSPAR) 7.5 MG tablet Take 1 tablet by mouth 3 (Three) Times a Day. 10/26/21   Kaylan Cochran APRN   cetirizine (zyrTEC) 10 MG tablet Take 1 tablet by mouth Daily. 10/26/21   Kaylan Cochran APRN   dicyclomine (BENTYL) 10 MG capsule Take 1 capsule by mouth 4 (Four) Times a Day As Needed (stomach pains). 2/16/22   Kaylan Cochran APRN   esomeprazole (nexIUM) 40 MG capsule  9/27/23   ProviderJuan Carlos MD   linaclotide (Linzess) 145 MCG capsule capsule Take 1 capsule by mouth Daily. 9/9/24   Juan Carlos Donato MD   ondansetron  "ODT (ZOFRAN-ODT) 8 MG disintegrating tablet Place 1 tablet on the tongue Every 8 (Eight) Hours As Needed. 9/9/24   Provider, MD Juan Carlos   oxybutynin XL (DITROPAN-XL) 5 MG 24 hr tablet Take 1 tablet by mouth Daily. 10/2/24   Chelsey Garcia APRN   polyethylene glycol-electrolytes (NULYTELY) 420 g solution Use has instructed by your Gastroenterologist. 10/31/24   Barbara Oropeza APRN   tiZANidine (ZANAFLEX) 4 MG tablet Take 1 tablet by mouth Every 8 (Eight) Hours.    Provider, MD Juan Carlos   traZODone (DESYREL) 300 MG tablet Take 1 tablet by mouth every night at bedtime. 1/28/22   Kaylan Cochran APRN        Social History:   Social History     Tobacco Use    Smoking status: Never     Passive exposure: Never    Smokeless tobacco: Never   Vaping Use    Vaping status: Never Used   Substance Use Topics    Alcohol use: Not Currently    Drug use: Not Currently     Types: Marijuana         Review of Systems:  Review of Systems   Constitutional:  Negative for chills and fever.   HENT:  Negative for congestion, ear pain and sore throat.    Eyes:  Negative for pain.   Respiratory:  Negative for cough, chest tightness and shortness of breath.    Cardiovascular:  Negative for chest pain.   Gastrointestinal:  Negative for abdominal pain, diarrhea, nausea and vomiting.   Genitourinary:  Negative for flank pain and hematuria.   Musculoskeletal:  Negative for joint swelling.   Skin:  Negative for pallor.   Neurological:  Negative for seizures and headaches.   Psychiatric/Behavioral:  Positive for hallucinations and suicidal ideas. The patient is nervous/anxious.    All other systems reviewed and are negative.       Physical Exam:  /75 (BP Location: Right arm, Patient Position: Sitting)   Pulse 72   Temp 97.7 °F (36.5 °C) (Oral)   Resp 16   Ht 160 cm (63\")   Wt 99 kg (218 lb 4.1 oz)   LMP  (LMP Unknown)   SpO2 96%   BMI 38.66 kg/m²     Physical Exam    Vital signs were reviewed under triage " note.  General appearance - Patient appears well-developed and well-nourished.  Patient is in no acute distress.  Head - Normocephalic, atraumatic.  Pupils - Equal, round, reactive to light.  Extraocular muscles are intact.  Conjunctiva is clear.  Nasal - Normal inspection.  No evidence of trauma or epistaxis.  Tympanic membranes - Gray, intact without erythema or retractions.  Oral mucosa - Pink and moist without lesions or erythema.  Uvula is midline.  Chest wall - Atraumatic.  Chest wall is nontender.  There are no vesicular rashes noted.  Neck - Supple.  Trachea was midline.  There is no palpable lymphadenopathy or thyromegaly.  There are no meningeal signs  Lungs - Clear to auscultation and percussion bilaterally.  Heart - Regular rate and rhythm without any murmurs, clicks, or gallops.  Abdomen - Soft.  Bowel sounds are present.  There is no palpable tenderness.  There is no rebound, guarding, or rigidity.  There are no palpable masses.  There are no pulsatile masses.  Back - Spine is straight and midline.  There is no CVA tenderness.  Extremities - Intact x4 with full range of motion.  There is no palpable edema.  Pulses are intact x4 and equal.  Neurologic - Patient is awake, alert, and oriented x3.  Cranial nerves II through XII are grossly intact.  Motor and sensory functions grossly intact.  Cerebellar function was normal.  Integument - There are no rashes.  There are no petechia or purpura lesions noted.  There are no vesicular lesions noted.   Psychiatric - Patient is awake and alert.  Patient reports that she feels very anxious.  Patient has poor insight.  Patient admits to having visual hallucinations.  Patient also admits to having suicidal ideations.  Patient is vague will not answer if she is actively having thoughts of wanting to harm herself or she has a plan patient neither admits to refutes that.         Procedures:  Procedures      Medical Decision Making:      Comorbidities that affect  care:    Bipolar disorder, hypertension, vitamin D deficiency, ADHD, GERD    External Notes reviewed:    Previous Clinic Note: Office visit with Kaylan Garcia on 10-24 was reviewed by me.      The following orders were placed and all results were independently analyzed by me:  Orders Placed This Encounter   Procedures    [place] Draw    Comprehensive Metabolic Panel    Ethanol    Urine Drug Screen - Urine, Clean Catch    Acetaminophen Level    Salicylate Level    TSH    T4, Free    hCG, Quantitative, Pregnancy    CBC Auto Differential    Fentanyl, Urine - Urine, Clean Catch    NPO Diet NPO Type: Strict NPO    IP General Consult (Use specialty-specific consult if known)    POC Glucose Once    Legal Status 72 Hour Hold    Suicide Precautions    CBC & Differential    Green Top (Gel)    Lavender Top    Gold Top - SST    Light Blue Top       Medications Given in the Emergency Department:  Medications   hydrOXYzine (ATARAX) tablet 25 mg (25 mg Oral Given 12/1/24 1118)        ED Course:     The patient was seen and evaluated in the ED by me.  The above history and physical examination was performed as documented.  Diagnostic data was obtained.  Results reviewed.  Patient expressed suicidal thoughts but then would not further discuss whether these are active or she has a plan.  Patient was very compact and her interactions in the ED and unwilling to have a discussion.  I had the ED mental health evaluator see the patient as well.  Her evaluation was essentially same as mine.  We both felt the patient cannot contract for safety and it was unclear the level of potential harm to herself so decision was made to patient has a 2-hour hold so that she could not harm herself and obtain proper psychiatric evaluation and treatment.  During the medical clearance patient was found to be positive for polysubstance abuse.  This may be a driving factor in her presentation.    Labs:    Lab Results (last 24 hours)       Procedure  Component Value Units Date/Time    CBC & Differential [[id number]]  (Normal) Collected: 12/01/24 0943    Specimen: Blood from Arm, Left Updated: 12/01/24 0950    Narrative:      The following orders were created for panel order CBC & Differential.  Procedure                               Abnormality         Status                     ---------                               -----------         ------                     CBC Auto Differential[[id number]]        Normal              Final result                 Please view results for these tests on the individual orders.    Comprehensive Metabolic Panel [[id number]]  (Abnormal) Collected: 12/01/24 0943    Specimen: Blood from Arm, Left Updated: 12/01/24 1010     Glucose 117 mg/dL      BUN 10 mg/dL      Creatinine 0.74 mg/dL      Sodium 139 mmol/L      Potassium 3.8 mmol/L      Chloride 103 mmol/L      CO2 23.5 mmol/L      Calcium 9.1 mg/dL      Total Protein 7.3 g/dL      Albumin 4.4 g/dL      ALT (SGPT) 11 U/L      AST (SGOT) 16 U/L      Alkaline Phosphatase 104 U/L      Total Bilirubin 0.3 mg/dL      Globulin 2.9 gm/dL      A/G Ratio 1.5 g/dL      BUN/Creatinine Ratio 13.5     Anion Gap 12.5 mmol/L      eGFR 93.3 mL/min/1.73     Narrative:      GFR Normal >60  Chronic Kidney Disease <60  Kidney Failure <15      Ethanol [[id number]] Collected: 12/01/24 0943    Specimen: Blood from Arm, Left Updated: 12/01/24 1010     Ethanol <10 mg/dL      Ethanol % <0.010 %     Narrative:      Ethanol (Plasma)  <10 Essentially Negative    Toxic Concentrations           mg/dL    Flushing, slowing of reflexes    Impaired visual activity         Depression of CNS              >100  Possible Coma                  >300       Acetaminophen Level [[id number]]  (Normal) Collected: 12/01/24 0943    Specimen: Blood from Arm, Left Updated: 12/01/24 1010     Acetaminophen <5.0 mcg/mL     Salicylate Level [[id number]]  (Normal) Collected: 12/01/24 0943    Specimen: Blood from Arm, Left  Updated: 12/01/24 1010     Salicylate <0.3 mg/dL     TSH [[id number]]  (Normal) Collected: 12/01/24 0943    Specimen: Blood from Arm, Left Updated: 12/01/24 1016     TSH 0.720 uIU/mL     T4, Free [[id number]]  (Normal) Collected: 12/01/24 0943    Specimen: Blood from Arm, Left Updated: 12/01/24 1016     Free T4 1.33 ng/dL     hCG, Quantitative, Pregnancy [[id number]] Collected: 12/01/24 0943    Specimen: Blood from Arm, Left Updated: 12/01/24 1006     HCG Quantitative 1.55 mIU/mL     Narrative:      HCG Ranges by Gestational Age    Females - non-pregnant premenopausal   </= 1mIU/mL HCG  Females - postmenopausal               </= 7mIU/mL HCG    3 Weeks       5.4   -      72 mIU/mL  4 Weeks      10.2   -     708 mIU/mL  5 Weeks       217   -   8,245 mIU/mL  6 Weeks       152   -  32,177 mIU/mL  7 Weeks     4,059   - 153,767 mIU/mL  8 Weeks    31,366   - 149,094 mIU/mL  9 Weeks    59,109   - 135,901 mIU/mL  10 Weeks   44,186   - 170,409 mIU/mL  12 Weeks   27,107   - 201,615 mIU/mL  14 Weeks   24,302   -  93,646 mIU/mL  15 Weeks   12,540   -  69,747 mIU/mL  16 Weeks    8,904   -  55,332 mIU/mL  17 Weeks    8,240   -  51,793 mIU/mL  18 Weeks    9,649   -  55,271 mIU/mL      CBC Auto Differential [[id number]]  (Normal) Collected: 12/01/24 0943    Specimen: Blood from Arm, Left Updated: 12/01/24 0950     WBC 4.46 10*3/mm3      RBC 4.62 10*6/mm3      Hemoglobin 12.9 g/dL      Hematocrit 38.4 %      MCV 83.1 fL      MCH 27.9 pg      MCHC 33.6 g/dL      RDW 13.2 %      RDW-SD 39.9 fl      MPV 10.0 fL      Platelets 199 10*3/mm3      Neutrophil % 46.9 %      Lymphocyte % 43.5 %      Monocyte % 6.7 %      Eosinophil % 2.0 %      Basophil % 0.7 %      Immature Grans % 0.2 %      Neutrophils, Absolute 2.09 10*3/mm3      Lymphocytes, Absolute 1.94 10*3/mm3      Monocytes, Absolute 0.30 10*3/mm3      Eosinophils, Absolute 0.09 10*3/mm3      Basophils, Absolute 0.03 10*3/mm3      Immature Grans, Absolute 0.01 10*3/mm3      nRBC 0.0 /100  WBC     Urine Drug Screen - Urine, Clean Catch [[id number]]  (Abnormal) Collected: 12/01/24 1045    Specimen: Urine, Clean Catch Updated: 12/01/24 1105     THC, Screen, Urine Negative     Phencyclidine (PCP), Urine Negative     Cocaine Screen, Urine Positive     Methamphetamine, Ur Negative     Opiate Screen Negative     Amphetamine Screen, Urine Negative     Benzodiazepine Screen, Urine Positive     Tricyclic Antidepressants Screen Negative     Methadone Screen, Urine Negative     Barbiturates Screen, Urine Negative     Oxycodone Screen, Urine Negative     Buprenorphine, Screen, Urine Negative    Narrative:      Cutoff For Drugs Screened:    Amphetamines               500 ng/ml  Barbiturates               200 ng/ml  Benzodiazepines            150 ng/ml  Cocaine                    150 ng/ml  Methadone                  200 ng/ml  Opiates                    100 ng/ml  Phencyclidine               25 ng/ml  THC                         50 ng/ml  Methamphetamine            500 ng/ml  Tricyclic Antidepressants  300 ng/ml  Oxycodone                  100 ng/ml  Buprenorphine               10 ng/ml    The normal value for all drugs tested is negative. This report includes unconfirmed screening results, with the cutoff values listed, to be used for medical treatment purposes only.  Unconfirmed results must not be used for non-medical purposes such as employment or legal testing.  Clinical consideration should be applied to any drug of abuse test, particularly when unconfirmed results are used.      Fentanyl, Urine - Urine, Clean Catch [[id number]]  (Normal) Collected: 12/01/24 1045    Specimen: Urine, Clean Catch Updated: 12/01/24 1104     Fentanyl, Urine Negative    Narrative:      Negative Threshold:      Fentanyl 5 ng/mL     The normal value for the drug tested is negative. This report includes final unconfirmed screening results to be used for medical treatment purposes only. Unconfirmed results must not be used for  non-medical purposes such as employment or legal testing. Clinical consideration should be applied to any drug of abuse test, particularly when unconfirmed results are used.                    Imaging:    No Radiology Exams Resulted Within Past 24 Hours      Differential Diagnosis and Discussion:    Psychiatric: Differential diagnosis includes but is not limited to depression, psychosis, bipolar disorder, anxiety, manic episode, schizophrenia, and substance abuse.    All labs were reviewed and interpreted by me.    [facility]       Critical Care Note: Total Critical Care time of 35 minutes. Total critical care time documented does not include time spent on separately billed procedures for services of nurses or physician assistants. I personally saw and examined the patient. I have reviewed all diagnostic interpretations and treatment plans as written. I was present for the key portions of any procedures performed and the inclusive time noted in any critical care statement. Critical care time includes patient management by me, time spent at the patients bedside,  time to review lab and imaging results, discussing patient care, documentation in the medical record, and time spent with family or caregiver.    Patient Care Considerations:    PSYCH: I considered ordering anxiolytic and or antipsychotic medications, however patient was able to facilitate the medical screening exam and disposition without further medications.      Consultants/Shared Management Plan:    Consultant: I have discussed the case with Dr. Green who states he will admit the patient to [facility] for further psychiatric evaluation and treatment.    Social Determinants of Health:    The patient's mental status demonstrates obvious anxiety with verbal admission to suicidal ideation.  Patient does not deem safe to be discharged home at this time.      Disposition and Care Coordination:    Psychiatric Admission: Through independent evaluation of the patient's  history and physical and consultation with psychiatry, the patient meets criteria for admission to a psychiatric facility.        Final diagnoses:   Suicidal ideation   Anxiety   Substance abuse        ED Disposition       ED Disposition   DC/Transfer to Behavioral Health Condition   Stable    Comment   --               This medical record created using voice recognition software.             Zachary Hightower DO  12/03/24 2123
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
## 2024-12-01 NOTE — NURSING NOTE
58 y/o female pt. Was admitted from the ED to the [facility] at 1353 to the services of dr. Green. Per report pt. Was having suicidal thoughts and complaints of anxiety and panic attacks, that pt. Was tearful and attempted to leave and was placed on 72 hour hold by dr. Green. Pt. On arrival was quiet and cooperative with flat depressed affect and poor eye contact. Pt. Denied any si/hi/avh, though admits to some paranoia, rated depression and anxiety high and was administered prn atarax for anxiety. Pt. Contracted for safety, said she did not and would not hurt her self or anyone else and denied any past suicide attempt, but only thoughts. Pt. Denied any substance abuse other than THC. Pt. Reports wanting help for depression and her panic attacks. Pt. Was oriented to the unit, will con't to monitor and provide a safe environment.

## 2024-12-02 VITALS
TEMPERATURE: 98 F | HEART RATE: 67 BPM | RESPIRATION RATE: 16 BRPM | SYSTOLIC BLOOD PRESSURE: 180 MMHG | OXYGEN SATURATION: 96 % | DIASTOLIC BLOOD PRESSURE: 92 MMHG

## 2024-12-02 PROBLEM — F29 PSYCHOSIS: Status: RESOLVED | Noted: 2024-12-01 | Resolved: 2024-12-02

## 2024-12-02 RX ORDER — CETIRIZINE HYDROCHLORIDE 10 MG/1
10 TABLET ORAL DAILY
Status: DISCONTINUED | OUTPATIENT
Start: 2024-12-02 | End: 2024-12-02 | Stop reason: HOSPADM

## 2024-12-02 RX ORDER — PANTOPRAZOLE SODIUM 40 MG/1
40 TABLET, DELAYED RELEASE ORAL
Status: DISCONTINUED | OUTPATIENT
Start: 2024-12-02 | End: 2024-12-02 | Stop reason: HOSPADM

## 2024-12-02 RX ORDER — ONDANSETRON 4 MG/1
4 TABLET, ORALLY DISINTEGRATING ORAL EVERY 6 HOURS PRN
Status: DISCONTINUED | OUTPATIENT
Start: 2024-12-02 | End: 2024-12-02 | Stop reason: HOSPADM

## 2024-12-02 RX ORDER — OXYBUTYNIN CHLORIDE 5 MG/1
5 TABLET, EXTENDED RELEASE ORAL DAILY
Status: DISCONTINUED | OUTPATIENT
Start: 2024-12-02 | End: 2024-12-02 | Stop reason: HOSPADM

## 2024-12-02 RX ADMIN — OXYBUTYNIN CHLORIDE 5 MG: 5 TABLET, EXTENDED RELEASE ORAL at 10:08

## 2024-12-02 RX ADMIN — PANTOPRAZOLE SODIUM 40 MG: 40 TABLET, DELAYED RELEASE ORAL at 10:09

## 2024-12-02 NOTE — DISCHARGE SUMMARY
[facility]         DISCHARGE SUMMARY    Patient Name: Kacie Lynch  : 1965  MRN: [id number]    Date of Admission: 2024  Date of Discharge: 2024  Primary Care Physician: Reema Anderson PA-C    Consults       No orders found for last 30 day(s).            Presenting Problem:   Psychosis [F29]    Active and Resolved Hospital Problems:  Active Hospital Problems    Diagnosis POA    Seasonal allergic rhinitis [J30.2] Yes    Irritable bowel syndrome [K58.9] Yes    Gastroesophageal reflux disease [K21.9] Yes    Anxiety [F41.9] Yes    Diverticulitis [K57.92] Yes    Hypertension [I10] Yes      Resolved Hospital Problems    Diagnosis POA    **Psychosis [F29] Yes         Hospital Course     Hospital Course:  Kacie Lynch is a 59 y.o. female with depression and anxiety that was admitted discharged on the same day.  Refer to history and physical for details of hospitalization          DISCHARGE Follow Up Recommendations for labs and diagnostics: Primary care for routine health maintenance, substance treatment, [facility]      Day of Discharge     Vital Signs:  Temp:  [97.7 °F (36.5 °C)-98 °F (36.7 °C)] 98 °F (36.7 °C)  Heart Rate:  [67-89] 67  Resp:  [16-18] 16  BP: (172-210)/(73-95) 180/92      Pertinent  and/or Most Recent Results     LAB RESULTS:      Lab 24  0943   WBC 4.46   HEMOGLOBIN 12.9   HEMATOCRIT 38.4   PLATELETS 199   NEUTROS ABS 2.09   IMMATURE GRANS (ABS) 0.01   LYMPHS ABS 1.94   MONOS ABS 0.30   EOS ABS 0.09   MCV 83.1         Lab 24  0943   SODIUM 139   POTASSIUM 3.8   CHLORIDE 103   CO2 23.5   ANION GAP 12.5   BUN 10   CREATININE 0.74   EGFR 93.3   GLUCOSE 117*   CALCIUM 9.1   TSH 0.720         Lab 24  0943   TOTAL PROTEIN 7.3   ALBUMIN 4.4   GLOBULIN 2.9   ALT (SGPT) 11   AST (SGOT) 16   BILIRUBIN 0.3   ALK PHOS 104                                     Lab 24  0943   ETHANOL PCT <0.010   ETHANOL MGDL <10         Lab  12/01/24  1045   BENZODIAZEPINE SCREEN, URINE Positive*   COCAINE SCREEN, URINE Positive*   OPIATES Negative   THC URINE SCREEN Negative   METHADONE SCREEN, URINE Negative     Brief Urine Lab Results  (Last result in the past 365 days)        Color   Clarity   Blood   Leuk Est   Nitrite   Protein   CREAT   Urine HCG        08/12/24 1513 Yellow   Clear   Negative   Trace   Negative   Negative                                       Imaging Results (Last 7 Days)       ** No results found for the last 168 hours. **             Labs Pending at Discharge:           Discharge Details        Discharge Medications        Continue These Medications        Instructions Start Date   busPIRone 7.5 MG tablet  Commonly known as: BUSPAR   7.5 mg, Oral, 3 Times Daily      cetirizine 10 MG tablet  Commonly known as: zyrTEC   10 mg, Oral, Daily      dicyclomine 10 MG capsule  Commonly known as: BENTYL   10 mg, Oral, 4 Times Daily PRN      esomeprazole 40 MG capsule  Commonly known as: nexIUM   Take 1 capsule by mouth Every Morning Before Breakfast.      Linzess 145 MCG capsule capsule  Generic drug: linaclotide   145 mcg, Oral, Every 24 Hours      ondansetron ODT 8 MG disintegrating tablet  Commonly known as: ZOFRAN-ODT   8 mg, Translingual, Every 8 Hours PRN      oxybutynin XL 5 MG 24 hr tablet  Commonly known as: DITROPAN-XL   5 mg, Oral, Daily      tiZANidine 4 MG tablet  Commonly known as: ZANAFLEX   4 mg, Oral, Every 8 Hours Scheduled      traZODone 300 MG tablet  Commonly known as: DESYREL   300 mg, Oral, Every Night at Bedtime             Stop These Medications      ALPRAZolam 0.5 MG tablet  Commonly known as: XANAX              Allergies   Allergen Reactions    Penicillins Anaphylaxis    Amlodipine Cough    Hydralazine Swelling    Labetalol Swelling    Metoprolol Swelling    Amoxicillin Rash    Cariprazine Unknown - High Severity and Rash    Chlorthalidone Rash    Ciprofloxacin Rash    Clonidine Derivatives Cough     Hydrochlorothiazide Rash    Lamotrigine GI Intolerance and Cough    Latex Rash    Lisinopril Cough    Losartan Cough    Oxycodone-Acetaminophen Itching         Discharge Disposition:  Home or Self Care    Diet:  Hospital:  Diet Order   Procedures    Diet: Regular/House; Safe Tray; Fluid Consistency: Thin (IDDSI 0)         Discharge Activity: Ad adrienne.  Activity Instructions       Activity as Tolerated              Discharge Condition: Stable    CODE STATUS:  Code Status and Medical Interventions: CPR (Attempt to Resuscitate); Full Support   Ordered at: 12/01/24 1257     Code Status (Patient has no pulse and is not breathing):    CPR (Attempt to Resuscitate)     Medical Interventions (Patient has pulse or is breathing):    Full Support         Future Appointments   Date Time Provider Department Center   12/3/2024  9:45 AM Chelsey Garcia APRN [facility]       Additional Instructions for the Follow-ups that You Need to Schedule       Discharge Follow-up with PCP   As directed       Currently Documented PCP:    Reema Anderson PA-C    PCP Phone Number:    [phone]     Follow Up Details: As needed        Discharge Follow-up with Specified Provider: Drug and alcohol rehab   As directed      To: Drug and alcohol rehab        Discharge Follow-up with Specified Provider: Psychiatric providers as scheduled   As directed      To: Psychiatric providers as scheduled                Time spent on Discharge including face to face service: 10 minutes    Part of this note may be an electronic transcription/translation of spoken language to printed text using the Dragon dictation system.        Electronically signed by Gregorio Green MD, 12/02/24, 12:38 PM EST.

## 2024-12-02 NOTE — PLAN OF CARE
Goal Outcome Evaluation:  Plan of Care Reviewed With: patient  Patient Agreement with Plan of Care: agrees      Patient calm and cooperative. Denies suicidal and homicidal ideations. Denies a/v hallucinations. Rates anxiety 7, depression 6. Compliant with medications. Safety plan completed by patient prior to discharge.
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
## 2024-12-02 NOTE — PLAN OF CARE
Goal Outcome Evaluation:  Plan of Care Reviewed With: patient  Patient Agreement with Plan of Care: agrees   Pt withdrawn to room, speech is quiet, polite upon approach. Refused Risperdal related to “It makes me shake, I don't want to take that.” Pt had migraine this shift; administered Ibuprofen per request. Pt requesting medication to treat migraines that she normally takes, pt cannot recall the name. Rated both anxiety and depression 10; denied AVH, SI, HI. Slept all night.

## 2024-12-02 NOTE — SIGNIFICANT NOTE
12/02/24 1241   Plan   Patient/Family in Agreement with Plan yes   Final Discharge Disposition Code 01 - home or self-care       RN NN met with patient to discuss treatment and safe discharge plan.   Disposition- The patient reports her plan is to return to her home in [place], [place]. Patient reports she lives home independently and has support from her daughter who also lives in [place], [place].  Outpatient Services: Patient reports she does not have a provider for mental/behavioral health outpatient services and agreeable to have appointments scheduled.  [facility], [place], [place]  Monday December 9th at 12:00pm for medication management/therapy  Transportation: Patient reports her she arrived via personal vehicle and will use her PV at discharge.  Pharmacy:  [address] - [phone]       The patient reports no further questions at this time.   Primary RN notified of d/c plan

## 2024-12-03 ENCOUNTER — OFFICE VISIT (OUTPATIENT)
Dept: UROLOGY | Age: 59
End: 2024-12-03
Payer: COMMERCIAL

## 2024-12-03 VITALS — RESPIRATION RATE: 14 BRPM | BODY MASS INDEX: 38.67 KG/M2 | WEIGHT: 218.26 LBS | HEIGHT: 63 IN

## 2024-12-03 DIAGNOSIS — R35.0 URINARY FREQUENCY: ICD-10-CM

## 2024-12-03 DIAGNOSIS — R32 URINARY INCONTINENCE, UNSPECIFIED TYPE: Primary | ICD-10-CM

## 2024-12-03 DIAGNOSIS — N32.81 OVERACTIVE BLADDER: ICD-10-CM

## 2024-12-03 LAB — URINE VOLUME: 0

## 2024-12-03 RX ORDER — FESOTERODINE FUMARATE 4 MG/1
4 TABLET, FILM COATED, EXTENDED RELEASE ORAL
Qty: 30 TABLET | Refills: 2 | Status: SHIPPED | OUTPATIENT
Start: 2024-12-03

## 2024-12-03 RX ORDER — SUMATRIPTAN SUCCINATE 100 MG/1
50 TABLET ORAL ONCE AS NEEDED
COMMUNITY
Start: 2024-11-07

## 2024-12-03 RX ORDER — RIMEGEPANT SULFATE 75 MG/75MG
TABLET, ORALLY DISINTEGRATING ORAL
COMMUNITY
Start: 2024-10-29

## 2024-12-03 RX ORDER — ACYCLOVIR 800 MG/1
800 TABLET ORAL 2 TIMES DAILY
COMMUNITY
Start: 2024-11-07

## 2024-12-16 DIAGNOSIS — N32.81 OVERACTIVE BLADDER: Primary | ICD-10-CM

## 2024-12-16 RX ORDER — FESOTERODINE FUMARATE 4 MG/1
4 TABLET, FILM COATED, EXTENDED RELEASE ORAL
Qty: 30 TABLET | Refills: 3 | Status: SHIPPED | OUTPATIENT
Start: 2024-12-16

## 2024-12-19 ENCOUNTER — ANESTHESIA EVENT (OUTPATIENT)
Dept: GASTROENTEROLOGY | Facility: HOSPITAL | Age: 59
End: 2024-12-19
Payer: COMMERCIAL

## 2024-12-19 NOTE — ANESTHESIA PREPROCEDURE EVALUATION
Anesthesia Evaluation     Patient summary reviewed and Nursing notes reviewed   NPO Solid Status: > 8 hours  NPO Liquid Status: > 8 hours           Airway   Mallampati: II  TM distance: >3 FB  Neck ROM: full  No difficulty expected  Dental - normal exam     Pulmonary - normal exam   Cardiovascular - normal exam    (+) hypertension, valvular problems/murmurs murmur      Neuro/Psych  (+) headaches, numbness, psychiatric history Anxiety, Depression and Bipolar  GI/Hepatic/Renal/Endo    (+) obesity, morbid obesity, GERD    Musculoskeletal     Abdominal    Substance History      OB/GYN          Other   arthritis,     ROS/Med Hx Other: ABNORMAL ECG -  Sinus rhythm  Consider right atrial enlargement  Nonspecific T abnormalities, lateral leads  When compared with ECG of 25-Mar-2021 12:36:17,  Significant rate increase  Electronically Signed By: Can Dacosta ([facility]) 07-Aug-2023 19:07:01  Date and Time of Study: 2023-08-07 12:18:40                      Anesthesia Plan    ASA 2     general   total IV anesthesia  (Patient understands anesthesia not responsible for dental damage. Risks explained including allergic reactions, BP, HR, O2 changes, aspiration, advanced airway placement. Pt verbalized understanding.)  intravenous induction     Anesthetic plan, risks, benefits, and alternatives have been provided, discussed and informed consent has been obtained with: patient.    Plan discussed with CRNA.        CODE STATUS:

## 2024-12-20 ENCOUNTER — HOSPITAL ENCOUNTER (OUTPATIENT)
Facility: HOSPITAL | Age: 59
Setting detail: HOSPITAL OUTPATIENT SURGERY
Discharge: HOME | End: 2024-12-20
Attending: INTERNAL MEDICINE | Admitting: INTERNAL MEDICINE
Payer: COMMERCIAL

## 2024-12-20 ENCOUNTER — ANESTHESIA (OUTPATIENT)
Dept: GASTROENTEROLOGY | Facility: HOSPITAL | Age: 59
End: 2024-12-20
Payer: COMMERCIAL

## 2024-12-20 VITALS
SYSTOLIC BLOOD PRESSURE: 147 MMHG | WEIGHT: 215.17 LBS | TEMPERATURE: 97.4 F | OXYGEN SATURATION: 98 % | RESPIRATION RATE: 18 BRPM | BODY MASS INDEX: 38.12 KG/M2 | HEART RATE: 68 BPM | DIASTOLIC BLOOD PRESSURE: 86 MMHG

## 2024-12-20 DIAGNOSIS — Z12.11 SCREENING FOR MALIGNANT NEOPLASM OF COLON: ICD-10-CM

## 2024-12-20 DIAGNOSIS — Z86.0100 HISTORY OF COLON POLYPS: ICD-10-CM

## 2024-12-20 PROCEDURE — 25010000002 PROPOFOL 10 MG/ML EMULSION: Performed by: NURSE ANESTHETIST, CERTIFIED REGISTERED

## 2024-12-20 PROCEDURE — 25010000002 LIDOCAINE PF 2% 2 % SOLUTION: Performed by: NURSE ANESTHETIST, CERTIFIED REGISTERED

## 2024-12-20 RX ORDER — PROPOFOL 10 MG/ML
VIAL (ML) INTRAVENOUS AS NEEDED
Status: DISCONTINUED | OUTPATIENT
Start: 2024-12-20 | End: 2024-12-20 | Stop reason: SURG

## 2024-12-20 RX ORDER — HYDROCORTISONE 25 MG/G
CREAM TOPICAL 2 TIMES DAILY
Qty: 28 G | Refills: 0 | Status: SHIPPED | OUTPATIENT
Start: 2024-12-20 | End: 2025-01-03

## 2024-12-20 RX ORDER — LIDOCAINE HYDROCHLORIDE 20 MG/ML
INJECTION, SOLUTION EPIDURAL; INFILTRATION; INTRACAUDAL; PERINEURAL AS NEEDED
Status: DISCONTINUED | OUTPATIENT
Start: 2024-12-20 | End: 2024-12-20 | Stop reason: SURG

## 2024-12-20 RX ORDER — SODIUM CHLORIDE, SODIUM LACTATE, POTASSIUM CHLORIDE, CALCIUM CHLORIDE 600; 310; 30; 20 MG/100ML; MG/100ML; MG/100ML; MG/100ML
30 INJECTION, SOLUTION INTRAVENOUS CONTINUOUS
Status: DISCONTINUED | OUTPATIENT
Start: 2024-12-20 | End: 2024-12-20 | Stop reason: HOSPADM

## 2024-12-20 RX ADMIN — LIDOCAINE HYDROCHLORIDE 100 MG: 20 INJECTION, SOLUTION EPIDURAL; INFILTRATION; INTRACAUDAL; PERINEURAL at 11:04

## 2024-12-20 RX ADMIN — PROPOFOL 200 MCG/KG/MIN: 10 INJECTION, EMULSION INTRAVENOUS at 11:04

## 2024-12-20 RX ADMIN — PROPOFOL 100 MG: 10 INJECTION, EMULSION INTRAVENOUS at 11:04

## 2024-12-20 NOTE — H&P
Pre Procedure History & Physical    Chief Complaint:   Surveillance colonoscopy    Subjective     HPI:   60 yo F here for surveillance colonoscopy.    Past Medical History:   Past Medical History:   Diagnosis Date    Abdominal hernia 01/05/2021    ADHD (attention deficit hyperactivity disorder) 04/1975    Allergic 05/1979    Allergies     Anxiety 10/2001    Arthritis 07/2010    Bipolar disorder     Cataract 02/2024    Chest pain     Depression 02/25/2020    2009    Diverticulitis     Diverticulosis 11/2018    GERD (gastroesophageal reflux disease) 05/2008    Hair loss 01/05/2021    Headache O5/2000    Heart murmur     CHILD    Hypertension 06/02/2014    Irritable bowel syndrome 07/2007    Leukocytopenia 10/09/2020    Low back pain     Obesity 05/1999    Urinary incontinence 06/26/2014    Vitamin D deficiency 06/04/2014       Past Surgical History:  Past Surgical History:   Procedure Laterality Date    ABDOMINAL HERNIA REPAIR      BLADDER SURGERY      TENSION FREE VAGINAL TAPING    CHOLECYSTECTOMY  09/20/2012    COLONOSCOPY  06/26/2018    HYSTERECTOMY      PARTIAL    SUBTOTAL HYSTERECTOMY      TUBAL ABDOMINAL LIGATION  2007       Family History:  Family History   Problem Relation Age of Onset    Breast cancer Mother 72    Cancer Mother     Depression Mother     Hypertension Mother     Depression Sister     Depression Sister     Drug abuse Sister     Stroke Sister     Depression Sister     Mental illness Sister     Breast cancer Other 60    Stroke Other     Lung cancer Other 68    Stroke Other        Social History:   reports that she has never smoked. She has never been exposed to tobacco smoke. She has never used smokeless tobacco. She reports that she does not currently use alcohol. She reports that she does not currently use drugs after having used the following drugs: Marijuana.    Medications:   Medications Prior to Admission   Medication Sig Dispense Refill Last Dose/Taking    acyclovir (ZOVIRAX) 800 MG tablet  Take 1 tablet by mouth 2 (Two) Times a Day.       busPIRone (BUSPAR) 7.5 MG tablet Take 1 tablet by mouth 3 (Three) Times a Day. 270 tablet 1     cetirizine (zyrTEC) 10 MG tablet Take 1 tablet by mouth Daily. 90 tablet 3     dicyclomine (BENTYL) 10 MG capsule Take 1 capsule by mouth 4 (Four) Times a Day As Needed (stomach pains). 60 capsule 2     esomeprazole (nexIUM) 40 MG capsule Take 1 capsule by mouth Every Morning Before Breakfast. Indications: Gastroesophageal Reflux Disease       linaclotide (Linzess) 145 MCG capsule capsule Take 1 capsule by mouth Daily. Indications: Constipation caused by Irritable Bowel Syndrome       Nurtec 75 MG dispersible tablet DISSOLVE 1 TABLET BY MOUTH EVERY OTHER DAY       ondansetron ODT (ZOFRAN-ODT) 8 MG disintegrating tablet Place 1 tablet on the tongue Every 8 (Eight) Hours As Needed. Indications: Nausea and Vomiting       SUMAtriptan (IMITREX) 100 MG tablet Take 0.5 tablets by mouth 1 (One) Time As Needed.       tiZANidine (ZANAFLEX) 4 MG tablet Take 1 tablet by mouth Every 8 (Eight) Hours. Indications: Muscle Spasticity       Toviaz 4 MG tablet sustained-release 24 hour tablet Take 1 tablet by mouth Daily. 30 tablet 3     traZODone (DESYREL) 300 MG tablet Take 1 tablet by mouth every night at bedtime. 30 tablet 5        Allergies:  Penicillins, Amlodipine, Hydralazine, Labetalol, Metoprolol, Amoxicillin, Cariprazine, Chlorthalidone, Ciprofloxacin, Clonidine derivatives, Hydrochlorothiazide, Lamotrigine, Latex, Lisinopril, Losartan, and Oxycodone-acetaminophen    ROS:    Pertinent items are noted in HPI     Objective     Weight 97.6 kg (215 lb 2.7 oz), not currently breastfeeding.    Physical Exam   Constitutional: Pt is oriented to person, place, and time and well-developed, well-nourished, and in no distress.   Mouth/Throat: Oropharynx is clear and moist.   Neck: Normal range of motion.   Cardiovascular: Normal rate, regular rhythm and normal heart sounds.     Pulmonary/Chest: Effort normal and breath sounds normal.   Abdominal: Soft. Nontender  Skin: Skin is warm and dry.   Psychiatric: Mood, memory, affect and judgment normal.     Assessment & Plan     Diagnosis:  Surveillance colonoscopy    Anticipated Surgical Procedure:  Colonoscopy    The risks, benefits, and alternatives of this procedure have been discussed with the patient or the responsible party- the patient understands and agrees to proceed.

## 2024-12-20 NOTE — ANESTHESIA POSTPROCEDURE EVALUATION
Patient: Kacie Lynch    Procedure Summary       Date: 12/20/24 Room / Location: [facility] ENDOSCOPY 1 / [facility] ENDOSCOPY    Anesthesia Start: 1101 Anesthesia Stop: 1140    Procedure: COLONOSCOPY Diagnosis:       History of colon polyps      Screening for malignant neoplasm of colon      (History of colon polyps [Z86.0100])      (Screening for malignant neoplasm of colon [Z12.11])    Surgeons: Deanne Velasco MD Provider: Phoenix Parry CRNA    Anesthesia Type: general ASA Status: 2            Anesthesia Type: general    Vitals  Vitals Value Taken Time   /86 12/20/24 1159   Temp 36.3 °C (97.4 °F) 12/20/24 1158   Pulse 66 12/20/24 1200   Resp 18 12/20/24 1158   SpO2 99 % 12/20/24 1200   Vitals shown include unfiled device data.        Post Anesthesia Care and Evaluation    Post-procedure mental status: acceptable.  Pain management: satisfactory to patient    Airway patency: patent  Anesthetic complications: No anesthetic complications    Cardiovascular status: acceptable  Respiratory status: acceptable    Comments: Per chart review

## 2024-12-22 ENCOUNTER — TELEPHONE (OUTPATIENT)
Dept: GASTROENTEROLOGY | Facility: CLINIC | Age: 59
End: 2024-12-22
Payer: COMMERCIAL

## 2025-03-13 ENCOUNTER — OFFICE VISIT (OUTPATIENT)
Dept: UROLOGY | Age: 60
End: 2025-03-13
Payer: COMMERCIAL

## 2025-03-13 VITALS — RESPIRATION RATE: 14 BRPM | WEIGHT: 227.07 LBS | HEIGHT: 63 IN | BODY MASS INDEX: 40.23 KG/M2

## 2025-03-13 DIAGNOSIS — R32 URINARY INCONTINENCE, UNSPECIFIED TYPE: Primary | ICD-10-CM

## 2025-03-13 DIAGNOSIS — N32.81 OVERACTIVE BLADDER: ICD-10-CM

## 2025-03-13 LAB — URINE VOLUME: 0

## 2025-03-13 RX ORDER — ALPRAZOLAM 1 MG/1
1 TABLET ORAL NIGHTLY PRN
COMMUNITY
Start: 2025-02-07

## 2025-03-13 RX ORDER — CARIPRAZINE 1.5 MG/1
1.5 CAPSULE, GELATIN COATED ORAL DAILY
COMMUNITY
Start: 2025-03-12

## 2025-03-13 RX ORDER — FESOTERODINE FUMARATE 8 MG/1
8 TABLET, FILM COATED, EXTENDED RELEASE ORAL
Qty: 90 TABLET | Refills: 3 | Status: SHIPPED | OUTPATIENT
Start: 2025-03-13

## 2025-03-13 RX ORDER — HYDROXYZINE PAMOATE 25 MG/1
25 CAPSULE ORAL EVERY 6 HOURS PRN
COMMUNITY
Start: 2025-01-10

## 2025-03-13 NOTE — PROGRESS NOTES
Chief Complaint: Urinary Incontinence    Subjective         History of Present Illness  Kacie Lynch is a 59 y.o. female presents to [facility] UROLOGY to be seen for follow-up.    Patient was previously seen by me with last visit on 12/3/2024 for urinary frequency, OAB and incontinence.  She did not see improvement with oxybutynin so we switched her to fesoterodine.  She is here for follow-up    History of Present Illness  The patient is a 59-year-old female who presents for evaluation of overactive bladder.    She reports that her current medication, Toviaz 4 mg, has been beneficial in managing her symptoms. However, she expresses a desire for further improvement.    MEDICATIONS  Toviaz      Previous 12/3/2024:  The patient was previously seen by me in the office on 10/2/2024 for urinary frequency, OAB and incontinence.  The patient was started on oxybutynin at that visit.  Behavioral modifications were encouraged.  The patient is here to follow-up.     Did not see any change with oxybutynin.      Previous 10/2/2024:  Patient presents reporting she was given a dose of PCN last year, which is listed as an allergy, when this occurred she started having urinary incontinence. She reports since that time she has had more problems with incontinence.      Has never been treated for OAB.      Frequency-admits   Urgency-admits   Incontinence- admits with urgency and with cough/laugh/sneeze   Nocturia-3-4   GH-denies   History of stones-denies    surgeries-possible bladder repair with birth of her daughter   Family history of  malignancy-denies   Cardiopulmonary-denies   Anticoagulants-denies   Smoker-denies    Objective     Past Medical History:   Diagnosis Date    Abdominal hernia 01/05/2021    ADHD (attention deficit hyperactivity disorder) 04/1975    Allergic 05/1979    Allergies     Anxiety 10/2001    Arthritis 07/2010    Bipolar disorder     Cataract 02/2024    Chest pain     Depression  02/25/2020    2009    Diverticulitis     Diverticulosis 11/2018    GERD (gastroesophageal reflux disease) 05/2008    Hair loss 01/05/2021    Headache O5/2000    Heart murmur     CHILD    Hypertension 06/02/2014    Irritable bowel syndrome 07/2007    Leukocytopenia 10/09/2020    Low back pain     Obesity 05/1999    Urinary incontinence 06/26/2014    Vitamin D deficiency 06/04/2014       Past Surgical History:   Procedure Laterality Date    ABDOMINAL HERNIA REPAIR      BLADDER SURGERY      TENSION FREE VAGINAL TAPING    CHOLECYSTECTOMY  09/20/2012    COLONOSCOPY  06/26/2018    COLONOSCOPY N/A 12/20/2024    Procedure: COLONOSCOPY;  Surgeon: Deanne Velasco MD;  Location: [facility] ENDOSCOPY;  Service: Gastroenterology;  Laterality: N/A;  hemorroids    HYSTERECTOMY      PARTIAL    SUBTOTAL HYSTERECTOMY      TUBAL ABDOMINAL LIGATION  2007         Current Outpatient Medications:     ALPRAZolam (XANAX) 1 MG tablet, Take 1 tablet by mouth At Night As Needed., Disp: , Rfl:     busPIRone (BUSPAR) 7.5 MG tablet, Take 1 tablet by mouth 3 (Three) Times a Day., Disp: 270 tablet, Rfl: 1    cetirizine (zyrTEC) 10 MG tablet, Take 1 tablet by mouth Daily., Disp: 90 tablet, Rfl: 3    dicyclomine (BENTYL) 10 MG capsule, Take 1 capsule by mouth 4 (Four) Times a Day As Needed (stomach pains)., Disp: 60 capsule, Rfl: 2    esomeprazole (nexIUM) 40 MG capsule, Take 1 capsule by mouth Every Morning Before Breakfast. Indications: Gastroesophageal Reflux Disease, Disp: , Rfl:     hydrOXYzine pamoate (VISTARIL) 25 MG capsule, Take 1 capsule by mouth Every 6 (Six) Hours As Needed., Disp: , Rfl:     linaclotide (Linzess) 145 MCG capsule capsule, Take 1 capsule by mouth Daily. Indications: Constipation caused by Irritable Bowel Syndrome, Disp: , Rfl:     Nurtec 75 MG dispersible tablet, DISSOLVE 1 TABLET BY MOUTH EVERY OTHER DAY, Disp: , Rfl:     ondansetron ODT (ZOFRAN-ODT) 4 MG disintegrating tablet, Place 2 tablets on the tongue Every 8  (Eight) Hours As Needed for Nausea or Vomiting., Disp: 10 tablet, Rfl: 0    SUMAtriptan (IMITREX) 100 MG tablet, Take 0.5 tablets by mouth 1 (One) Time As Needed., Disp: , Rfl:     tiZANidine (ZANAFLEX) 4 MG tablet, Take 1 tablet by mouth Every 8 (Eight) Hours. Indications: Muscle Spasticity, Disp: , Rfl:     traZODone (DESYREL) 300 MG tablet, Take 1 tablet by mouth every night at bedtime., Disp: 30 tablet, Rfl: 5    Vraylar 1.5 MG capsule capsule, Take 1 capsule by mouth Daily., Disp: , Rfl:     acyclovir (ZOVIRAX) 800 MG tablet, Take 1 tablet by mouth 2 (Two) Times a Day. (Patient not taking: Reported on 3/13/2025), Disp: , Rfl:     fesoterodine fumarate (Toviaz) 8 MG tablet sustained-release 24 hour tablet, Take 1 tablet by mouth Daily., Disp: 90 tablet, Rfl: 3    Allergies   Allergen Reactions    Penicillins Anaphylaxis    Amlodipine Cough    Hydralazine Swelling    Labetalol Swelling    Metoprolol Swelling    Amoxicillin Rash    Cariprazine Unknown - High Severity and Rash    Chlorthalidone Rash    Ciprofloxacin Rash    Clonidine Derivatives Cough    Hydrochlorothiazide Rash    Lamotrigine GI Intolerance and Cough    Latex Rash    Lisinopril Cough    Losartan Cough    Oxycodone-Acetaminophen Itching        Family History   Problem Relation Age of Onset    Breast cancer Mother 72    Cancer Mother     Depression Mother     Hypertension Mother     Depression Sister     Depression Sister     Drug abuse Sister     Stroke Sister     Depression Sister     Mental illness Sister     Breast cancer Other 60    Stroke Other     Lung cancer Other 68    Stroke Other        Social History     Socioeconomic History    Marital status: Single   Tobacco Use    Smoking status: Never     Passive exposure: Never    Smokeless tobacco: Never   Vaping Use    Vaping status: Never Used   Substance and Sexual Activity    Alcohol use: Not Currently    Drug use: Not Currently     Types: Marijuana    Sexual activity: Not Currently      "Partners: Male     Birth control/protection: Condom       Vital Signs:   Resp 14   Ht 160 cm (63\")   Wt 103 kg (227 lb 1.2 oz)   BMI 40.22 kg/m²      Physical Exam  Vitals reviewed.   Constitutional:       Appearance: Normal appearance.   Neurological:      General: No focal deficit present.      Mental Status: She is alert and oriented to person, place, and time.   Psychiatric:         Mood and Affect: Mood normal.         Behavior: Behavior normal.          Result Review :   The following data was reviewed by: CHAPO Ya on 03/13/2025:      Bladder Scan interpretation 03/13/2025    Estimation of residual urine via [employer] 3000 Verathon Bladder Scan  MA/nurse performing: Neelima JOY MA  Residual Urine: 0 ml  Indication: Urinary incontinence, unspecified type    Overactive bladder   Position: Supine  Examination: Incremental scanning of the suprapubic area using 2.0 MHz transducer using copious amounts of acoustic gel.   Findings: An anechoic area was demonstrated which represented the bladder, with measurement of residual urine as noted. I inspected this myself. In that the residual urine was stable or insignificant, refer to plan for treatment and plan necessary at this time.            Results        Procedures        Assessment and Plan    Diagnoses and all orders for this visit:    1. Urinary incontinence, unspecified type (Primary)  -     Bladder Scan  -     fesoterodine fumarate (Toviaz) 8 MG tablet sustained-release 24 hour tablet; Take 1 tablet by mouth Daily.  Dispense: 90 tablet; Refill: 3    2. Overactive bladder  -     fesoterodine fumarate (Toviaz) 8 MG tablet sustained-release 24 hour tablet; Take 1 tablet by mouth Daily.  Dispense: 90 tablet; Refill: 3        Assessment & Plan  1. Overactive bladder.  Her current medication, Toviaz 4 mg, is providing some relief but not to the desired extent. The dosage of Toviaz will be increased to 8 mg. She is advised to take two 4 mg tablets if " she has any remaining at home until the new prescription is filled. Once the new prescription is available, she should revert to taking one 8 mg tablet. A prescription for a 90-day supply will be sent to her pharmacy at [facility] in [facility]. If there are any changes in her condition, she should contact the office immediately.    Follow-up  The patient will follow up in 6 months.      Follow Up   Return in about 6 months (around 9/13/2025).  Patient was given instructions and counseling regarding her condition or for health maintenance advice. Please see specific information pulled into the AVS if appropriate.         This document has been electronically signed by CHAPO Ya  March 13, 2025 10:28 EDT     Patient or patient representative verbalized consent for the use of Ambient Listening during the visit with  CHAPO Ya for chart documentation. 3/13/2025  10:29 EDT

## 2025-04-30 NOTE — PROGRESS NOTES
Chief Complaint  Memory loss.    Patient or patient representative verbalized consent for the use of Ambient Listening during the visit with  Cezar Jason MD PhD for chart documentation. 5/16/2025  13:07 EDT    Subjective      History of Present Illness:  Kacie Lynch is a delightful 59 y.o. right handed female who presents to [facility] NEUROLOGY & NEUROSURGERY with BiPolar/ADHD/SI referred for worsening memory loss x 3 years  with history of:  Past Medical History:   Diagnosis Date    Abdominal hernia 01/05/2021    ADHD (attention deficit hyperactivity disorder) 04/1975    Allergic 05/1979    Allergies     Anxiety 10/2001    Arthritis 07/2010    Bipolar disorder     Cataract 02/2024    Chest pain     Depression 02/25/2020    2009    Diverticulitis     Diverticulosis 11/2018    GERD (gastroesophageal reflux disease) 05/2008    Hair loss 01/05/2021    Headache O5/2000    Heart murmur     CHILD    Hypertension 06/02/2014    Irritable bowel syndrome 07/2007    Leukocytopenia 10/09/2020    Low back pain     Obesity 05/1999    Urinary incontinence 06/26/2014    Vitamin D deficiency 06/04/2014   Brendan accompanied today (grandson).        History of Present Illness  The patient is a 59-year-old female referred to neurology for memory changes. She is accompanied by her grandson.    She reports experiencing intermittent cognitive difficulties, including both memory and thinking issues, which began in 07/2023. These issues are particularly noticeable during conversations, where she often loses her train of thought. She resides alone but has a robust support network of friends and family, including a local daughter. Despite being right-handed, she struggles with daily activities such as eating, drinking, dressing, toileting, and personal hygiene without mental prompting. More complex tasks like managing household finances, cooking, cleaning, and grocery shopping have also been affected.  She attributes her financial difficulties to her inability to work due to back and leg injuries, which cause constant pain. Her daughter assists with bill payments due to her forgetfulness, which has led to instances of mispayment and overpayment. She uses a pill box for medication management but occasionally misses doses for up to 4 or 5 days, even with her daughter's supervision. She owns a cell phone and can make calls and send texts but struggles with using a TV remote and microwave. She frequently misplaces objects in her home and has noticed a decrease in social interaction with friends and family, feeling overwhelmed in large groups. She has been told that she repeats herself in conversations and struggles with word finding more than expected. Over the past 2 years, she has noticed an increase in depressive symptoms. She averages 4 hours of sleep per night, a long-standing issue, and occasionally feels the need to nap but is unable to do so. She does not experience hallucinations.     She has no history of stroke, seizure, CNS infection, concussion, or heavy alcohol use but admits to occasional social drinking and marijuana use. She occasionally drives without restriction and reports no traffic incidents in the past 5 years. She tends to leave her front door open at home.    PAST MEDICAL HISTORY: She has a history of bipolar disorder, OCD, PTSD, anxiety, depression, panic disorder, and ADHD.    SOCIAL HISTORY  Education Level: Almost finishing bachelor's degree  Occupations:   Alcohol: Drinks sometimes socially  Tobacco: Does not smoke cigarettes  Recreational Drugs: Smokes weed  Sleep: Average of four hours per night  Screen Time: Does not watch a lot of TV    FAMILY HISTORY  - Negative for stroke, seizure, CNS infection, concussion, and dementia in first-degree relatives.    MEDICATIONS  CURRENT  "MEDS:  Metformin  Gabapentin  Atorvastatin  Vraylar  Alprazolam  Hydroxyzine  Ondansetron  Sumatriptan  Nurtec  Dicyclomine  Trazodone  Cetirizine  Buspirone  Tizanidine        All available pertinent Labs and Imaging personally reviewed, including:    Imaging:    CT Head wo contrast 12/23/2020 for dizziness:  CONCLUSION:     1. Negative         Labs:  Lab Results   Component Value Date    WBC 4.46 12/01/2024    HGB 12.9 12/01/2024    HCT 38.4 12/01/2024    MCV 83.1 12/01/2024     12/01/2024     Lab Results   Component Value Date    GLUCOSE 117 (H) 12/01/2024    BUN 10 12/01/2024    CREATININE 0.74 12/01/2024     12/01/2024    K 3.8 12/01/2024     12/01/2024    CALCIUM 9.1 12/01/2024    PROTEINTOT 7.3 12/01/2024    ALBUMIN 4.4 12/01/2024    ALT 11 12/01/2024    AST 16 12/01/2024    ALKPHOS 104 12/01/2024    BILITOT 0.3 12/01/2024    GLOB 2.9 12/01/2024    AGRATIO 1.5 12/01/2024    BCR 13.5 12/01/2024    ANIONGAP 12.5 12/01/2024    EGFR 93.3 12/01/2024     Lab Results   Component Value Date    HGBA1C 5.80 (H) 02/16/2022     Lab Results   Component Value Date    TSH 0.720 12/01/2024     Lab Results   Component Value Date    SEHJWNVN36 446 02/16/2022     Lab Results   Component Value Date    FOLATE 11.20 02/16/2022     Lab Results   Component Value Date    CHOL 190 02/16/2022    CHLPL 170 06/24/2020    TRIG 136 02/16/2022    HDL 86 (H) 02/16/2022    LDL 81 02/16/2022         Objective   Vital Signs:   /84   Pulse 86   Ht 160 cm (63\")   Wt 102 kg (224 lb 12.8 oz)   BMI 39.82 kg/m²     MoCA: /30    GENERAL:  GEN: owgt, well appearing, no apparent distress, appropriately dressed and groomed.  HEENT: NCAT, nml symm facies, no LNA, no goiter  LUNGS: CTA wilman, no wheezes/crackles/rhonchi noted  Card: RRR, no m noted, no carotid bruit, wilman rad and dp pulses 2+ with cap refill < 2 sec  EXT: no cce, no rash noted    NEUROLOGICAL:  MS: A+O x 3, language spontaneous, fluent with logical content, no " word finding, no repeating or perseveration, reported own and regarded as excellent historian, normal judgement and insight, psychomotor slowing;   CN: PERRLA, full excursions in all cardinal directions with smooth pursuits throughout without saccadic breaks or nystagmus noted; horizontal and vertical saccades symmetric and on target. Cover test reveals no phoria. Visual fields full to confrontation. V1-III Light touch symm and intact; symm jaw clench masseter and temporalis bulk and tone, medial and lateral pterygoids intact. Symm forehead crease and grimace. Hearing grossly symm and intact to finger rub. Uvula and soft palate midline rise on gag. Sternocleidomastoids and traps symm and 5/5. Tongue demonstrates no furrowing and extends midline and into left and right.  MOTOR: no atrophy/drift, gegenhalten paratone, strength 5/5, no adventitial movements or tremor noted  SENSORY: LT/PP/Vib intact, symm, and wnL for age. Romberg - NEG  COORD: ALEXA/FTN/HTS with good rhythm, speed, and amplitude. No ataxia noted.  GAIT: Native slow antalgic mild wide based gait with decreased stride (Left leg pain, back pain), nml symm wilman armswing, nml start/stop/turn. Toe and heel walk with some difficulty (leg/back pain). Tandem walk NP.  DTR's: absent Ajs, o/w 2+ throughout; no clonus, Babinski - Absent.         ASSESSMENT & PLAN:    59 y.o. female who presents to [facility] NEUROLOGY & NE[facility]OSURGERY with BiPolar/ADHD/SI referred for worsening memory loss x 3 years.       Diagnoses and all orders for this visit:    1. Mild cognitive disorder (Primary)             Assessment & Plan  1. Memory changes.  Her memory screening test yielded a score of 26 out of 30, indicating normal cognitive function. However, she exhibits mild difficulties in orientation, time and place recognition, short-term memory retention, and command following, suggesting potential attention deficits. These could be attributed to modifiable  factors such as significant mood disorders, sleep disturbances, chronic fatigue, extensive medication use, and persistent pain. Her cognitive function may be further compromised by known conditions including mood disorders and ADHD. At present, there is no indication for initiating memory medication or imposing driving restrictions from a neurological standpoint. A comprehensive discussion was held regarding the importance of self-care, including maintaining a balanced diet, engaging in regular physical exercise, and stimulating the brain through activities such as reading and social interaction. The potential benefits of lifestyle modifications in preventing dementia were also discussed. Laboratory tests and an MRI of the brain will be ordered for further evaluation.    2. Chronic pain.  She reports chronic pain in her back and legs, with an average pain level of 9 out of 10. This pain interferes with her sleep and daily activities. She is currently on multiple medications including gabapentin and tizanidine. The impact of chronic pain on her cognitive function was discussed.    3. Mood disorders.  She has a history of bipolar disorder, OCD, PTSD, anxiety, depression, panic disorder, and ADHD. The impact of these mood disorders on her cognitive function was discussed.              Follow Up  Return in about 6 months (around 11/16/2025) for case and lab/MRI review.    40 minutes were spent caring for Kacie Lynch  on this date of service. This time spent by me includes preparing for the visit, reviewing tests, obtaining/reviewing separately obtained history, performing medically appropriate exam/evaluation, counseling/educating the patient/family/caregiver, ordering medications/tests/procedures, referring/communicating with other health care professionals, documenting information in the medical record, independently interpreting results and communicating that with the patient/family/caregiver and/or care  coordination.     Patient was given instructions and counseling regarding her condition or for health maintenance advice. Please see specific information pulled into the AVS if appropriate.

## 2025-05-12 ENCOUNTER — OFFICE VISIT (OUTPATIENT)
Dept: OBSTETRICS AND GYNECOLOGY | Age: 60
End: 2025-05-12
Payer: COMMERCIAL

## 2025-05-12 VITALS — BODY MASS INDEX: 39.15 KG/M2 | WEIGHT: 221 LBS | DIASTOLIC BLOOD PRESSURE: 85 MMHG | SYSTOLIC BLOOD PRESSURE: 138 MMHG

## 2025-05-12 DIAGNOSIS — Z01.419 ENCOUNTER FOR GYNECOLOGICAL EXAMINATION WITHOUT ABNORMAL FINDING: Primary | ICD-10-CM

## 2025-05-12 PROCEDURE — G0123 SCREEN CERV/VAG THIN LAYER: HCPCS | Performed by: NURSE PRACTITIONER

## 2025-05-12 PROCEDURE — 87624 HPV HI-RISK TYP POOLED RSLT: CPT | Performed by: NURSE PRACTITIONER

## 2025-05-12 NOTE — PROGRESS NOTES
Well Woman Visit    CC: Scheduled annual well gyn visit  Chief Complaint   Patient presents with    Gynecologic Exam           HPI:   59 y.o.No obstetric history on file.   Social History     Substance and Sexual Activity   Sexual Activity Not Currently    Partners: Male    Birth control/protection: Condom       PCP: does manage PMHx and preventative labs  History: PMHx, Meds, Allergies, PSHx, Social Hx, and POBHx all reviewed and updated.    Pt has no complaints today. Hysterectomy for a rare cancer in her uterus.     PHYSICAL EXAM:  /85   Wt 100 kg (221 lb)   LMP  (LMP Unknown)   BMI 39.15 kg/m²  Not found.     Exam conducted with a chaperone present  General- NAD, alert and oriented, appropriate  Psych- Normal mood, good memory  Neck- No masses, no thyroid enlargement  CV- Regular rhythm, no murnurs  Resp- CTA to bases, no wheezes  Abdomen- Soft, non distended, non tender, no masses    Breast left-  Bilaterally symmetrical, no masses, non tender, no nipple discharge  Breast right- Bilaterally symmetrical, no masses, non tender, no nipple discharge    External genitalia- Normal female, no lesions  Urethra/meatus- Normal, no masses, non tender  Bladder- Normal, no masses, non tender  Vagina- Normal, no atrophy, no lesions, no discharge.  Prolapse : none noted   Cvx- Absent  Uterus- Absent  Adnexa- No mass, non tender  Anus/Rectum/Perineum- Not performed    Lymphatic- No palpable neck, axillary, or groin nodes  Ext- No edema, no cyanosis    Skin- No lesions, no rashes, no acanthosis nigricans      ASSESSMENT and PLAN:    Diagnoses and all orders for this visit:    1. Encounter for gynecological examination without abnormal finding (Primary)  -     IgP, Aptima HPV        Preventative:  BREAST HEALTH- Monthly self breast exam importance and how to reviewed. MMG and/or MRI (prn) reviewed per society guidelines and her individual history. Screen: Already up to date  CERVICAL CANCER Screening- Reviewed current  ASCCP guidelines for screening w and wo cotest HPV, age specific.  Screen: Updated today  COLON CANCER Screening- Reviewed current medical society guidelines and options.  Screen:  Already up to date  SEXUAL HEALTH: Declines STD screening  VACCINATIONS Recommended: Covid vaccine, Flu annually.  Importance discussed, risk being unvaccinated reviewed.  Questions answered  Smoking status- NON SMOKER/VAPER        She understands the importance of having any ordered tests to be performed in a timely fashion.  The risks of not performing them include, but are not limited to, advanced cancer stages, bone loss from osteoporosis and/or subsequent increase in morbidity and/or mortality.  She is encouraged to review her results online and/or contact or office if she has questions.     Follow Up:  Return in about 1 year (around 5/12/2026) for Annual physical.        Blayne Gamble, APRN  05/12/2025    [facility] [address]  [facility]  [address] [place] [address]  Dept: [phone]  Dept Fax: [phone]  Loc: [phone]

## 2025-05-14 LAB
CYTOLOGIST CVX/VAG CYTO: NORMAL
CYTOLOGY CVX/VAG DOC CYTO: NORMAL
CYTOLOGY CVX/VAG DOC THIN PREP: NORMAL
DX ICD CODE: NORMAL
HPV I/H RISK 4 DNA CVX QL PROBE+SIG AMP: NEGATIVE
OTHER STN SPEC: NORMAL
SERVICE CMNT-IMP: NORMAL
STAT OF ADQ CVX/VAG CYTO-IMP: NORMAL

## 2025-05-16 ENCOUNTER — OFFICE VISIT (OUTPATIENT)
Dept: NEUROLOGY | Facility: CLINIC | Age: 60
End: 2025-05-16
Payer: COMMERCIAL

## 2025-05-16 ENCOUNTER — PATIENT ROUNDING (BHMG ONLY) (OUTPATIENT)
Dept: NEUROLOGY | Facility: CLINIC | Age: 60
End: 2025-05-16
Payer: COMMERCIAL

## 2025-05-16 VITALS
HEART RATE: 86 BPM | DIASTOLIC BLOOD PRESSURE: 84 MMHG | BODY MASS INDEX: 39.83 KG/M2 | SYSTOLIC BLOOD PRESSURE: 145 MMHG | WEIGHT: 224.8 LBS | HEIGHT: 63 IN

## 2025-05-16 DIAGNOSIS — F09 MILD COGNITIVE DISORDER: Primary | ICD-10-CM

## 2025-05-16 RX ORDER — METFORMIN HYDROCHLORIDE 750 MG/1
750 TABLET, EXTENDED RELEASE ORAL
COMMUNITY
Start: 2025-04-09

## 2025-05-16 RX ORDER — ATORVASTATIN CALCIUM 10 MG/1
10 TABLET, FILM COATED ORAL EVERY 24 HOURS
COMMUNITY
Start: 2025-04-09

## 2025-05-16 RX ORDER — GABAPENTIN 250 MG/5ML
SOLUTION ORAL 2 TIMES DAILY
COMMUNITY
Start: 2025-04-09

## 2025-05-28 ENCOUNTER — LAB (OUTPATIENT)
Dept: LAB | Facility: HOSPITAL | Age: 60
End: 2025-05-28
Payer: COMMERCIAL

## 2025-05-28 DIAGNOSIS — F09 MILD COGNITIVE DISORDER: ICD-10-CM

## 2025-05-28 PROCEDURE — 86592 SYPHILIS TEST NON-TREP QUAL: CPT

## 2025-05-28 PROCEDURE — 83520 IMMUNOASSAY QUANT NOS NONAB: CPT

## 2025-05-28 PROCEDURE — 83921 ORGANIC ACID SINGLE QUANT: CPT

## 2025-05-28 PROCEDURE — 36415 COLL VENOUS BLD VENIPUNCTURE: CPT

## 2025-05-29 LAB — RPR SER QL: NORMAL

## 2025-05-31 LAB — METHYLMALONATE SERPL-SCNC: 132 NMOL/L (ref 0–378)

## 2025-06-07 LAB
A -- BETA-AMYLOID 42/40 RATIO: 0.11
AL BETA40 PLAS-MCNC: 170.2 PG/ML
AL BETA42 PLAS-MCNC: 18.59 PG/ML
ATN SUMMARY1: NORMAL
INFORMATION:: NORMAL
NFL CHAIN SERPL IA-MCNC: 1.64 PG/ML (ref 0–2.99)
P-TAU181 PLAS IA-MCNC: 0.47 PG/ML (ref 0–0.97)

## 2025-07-07 ENCOUNTER — PATIENT MESSAGE (OUTPATIENT)
Dept: NEUROLOGY | Facility: CLINIC | Age: 60
End: 2025-07-07
Payer: COMMERCIAL

## (undated) DEVICE — Device: Brand: DEFENDO AIR/WATER/SUCTION AND BIOPSY VALVE

## (undated) DEVICE — SOL IRRG H2O PL/BG 1000ML STRL

## (undated) DEVICE — CONN JET HYDRA H20 AUXILIARY DISP

## (undated) DEVICE — Device

## (undated) DEVICE — SOLIDIFIER LIQLOC PLS 1500CC BT

## (undated) DEVICE — LINER SURG CANSTR SXN S/RIGD 1500CC